# Patient Record
Sex: FEMALE | Race: WHITE | Employment: OTHER | URBAN - METROPOLITAN AREA
[De-identification: names, ages, dates, MRNs, and addresses within clinical notes are randomized per-mention and may not be internally consistent; named-entity substitution may affect disease eponyms.]

---

## 2017-03-07 ENCOUNTER — HOSPITAL ENCOUNTER (OUTPATIENT)
Dept: CT IMAGING | Age: 67
Discharge: HOME | End: 2017-03-07
Attending: INTERNAL MEDICINE
Payer: MEDICARE

## 2017-03-07 DIAGNOSIS — R91.1 LUNG NODULE: ICD-10-CM

## 2017-03-07 PROCEDURE — 71250 CT THORAX DX C-: CPT

## 2017-08-28 ENCOUNTER — HOSPITAL ENCOUNTER (OUTPATIENT)
Dept: CT IMAGING | Age: 67
Discharge: HOME | End: 2017-08-28
Attending: INTERNAL MEDICINE
Payer: MEDICARE

## 2017-08-28 DIAGNOSIS — R91.1 SOLITARY PULMONARY NODULE: ICD-10-CM

## 2017-08-28 PROCEDURE — 71250 CT THORAX DX C-: CPT

## 2018-05-09 ENCOUNTER — HOSPITAL ENCOUNTER (OUTPATIENT)
Dept: MAMMOGRAPHY | Age: 68
Discharge: HOME | End: 2018-05-09
Attending: NURSE PRACTITIONER
Payer: MEDICARE

## 2018-05-09 ENCOUNTER — HOSPITAL ENCOUNTER (OUTPATIENT)
Dept: GENERAL RADIOLOGY | Age: 68
Discharge: HOME | End: 2018-05-09
Attending: NURSE PRACTITIONER
Payer: MEDICARE

## 2018-05-09 DIAGNOSIS — M81.0 OSTEOPOROSIS: ICD-10-CM

## 2018-05-09 DIAGNOSIS — Z12.31 VISIT FOR SCREENING MAMMOGRAM: ICD-10-CM

## 2018-05-09 PROCEDURE — 77063 BREAST TOMOSYNTHESIS BI: CPT

## 2018-05-09 PROCEDURE — 77080 DXA BONE DENSITY AXIAL: CPT

## 2018-05-14 ENCOUNTER — HOSPITAL ENCOUNTER (OUTPATIENT)
Dept: MAMMOGRAPHY | Age: 68
Discharge: HOME | End: 2018-05-14
Attending: NURSE PRACTITIONER
Payer: MEDICARE

## 2018-05-14 DIAGNOSIS — N64.89 BREAST ASYMMETRY: ICD-10-CM

## 2018-05-14 PROCEDURE — 77065 DX MAMMO INCL CAD UNI: CPT

## 2018-10-24 ENCOUNTER — HOSPITAL ENCOUNTER (OUTPATIENT)
Dept: GENERAL RADIOLOGY | Age: 68
Discharge: HOME | End: 2018-10-24
Payer: MEDICARE

## 2018-10-24 DIAGNOSIS — J44.9 CHRONIC OBSTRUCTIVE PULMONARY DISEASE (HCC): ICD-10-CM

## 2018-10-24 PROCEDURE — 71046 X-RAY EXAM CHEST 2 VIEWS: CPT

## 2019-03-13 ENCOUNTER — HOSPITAL ENCOUNTER (OUTPATIENT)
Dept: CT IMAGING | Age: 69
Discharge: HOME | End: 2019-03-13
Attending: NURSE PRACTITIONER
Payer: MEDICARE

## 2019-03-13 DIAGNOSIS — R91.1 SOLITARY PULMONARY NODULE: ICD-10-CM

## 2019-03-13 DIAGNOSIS — J98.8 OTHER SPECIFIED RESPIRATORY DISORDERS: ICD-10-CM

## 2019-03-13 PROCEDURE — 71250 CT THORAX DX C-: CPT

## 2019-06-04 ENCOUNTER — HOSPITAL ENCOUNTER (OUTPATIENT)
Dept: CT IMAGING | Age: 69
Discharge: HOME | End: 2019-06-04
Attending: NURSE PRACTITIONER
Payer: MEDICARE

## 2019-06-04 DIAGNOSIS — R91.1 SOLITARY PULMONARY NODULE: ICD-10-CM

## 2019-06-04 DIAGNOSIS — J98.8 OTHER SPECIFIED RESPIRATORY DISORDERS: ICD-10-CM

## 2019-06-04 PROCEDURE — 71250 CT THORAX DX C-: CPT

## 2019-07-08 ENCOUNTER — HOSPITAL ENCOUNTER (OUTPATIENT)
Dept: MAMMOGRAPHY | Age: 69
Discharge: HOME | End: 2019-07-08
Attending: NURSE PRACTITIONER
Payer: MEDICARE

## 2019-07-08 DIAGNOSIS — Z12.39 BREAST SCREENING, UNSPECIFIED: ICD-10-CM

## 2019-07-08 PROCEDURE — 77063 BREAST TOMOSYNTHESIS BI: CPT

## 2020-02-15 ENCOUNTER — APPOINTMENT (OUTPATIENT)
Dept: GENERAL RADIOLOGY | Age: 70
DRG: 871 | End: 2020-02-15
Attending: PHYSICIAN ASSISTANT
Payer: MEDICARE

## 2020-02-15 ENCOUNTER — APPOINTMENT (OUTPATIENT)
Dept: CT IMAGING | Age: 70
DRG: 871 | End: 2020-02-15
Attending: EMERGENCY MEDICINE
Payer: MEDICARE

## 2020-02-15 ENCOUNTER — HOSPITAL ENCOUNTER (INPATIENT)
Age: 70
LOS: 2 days | Discharge: HOME | DRG: 871 | End: 2020-02-17
Attending: EMERGENCY MEDICINE | Admitting: HOSPITALIST
Payer: MEDICARE

## 2020-02-15 DIAGNOSIS — D84.9 IMMUNOSUPPRESSED STATUS (HCC): ICD-10-CM

## 2020-02-15 DIAGNOSIS — A41.9 SEPSIS, DUE TO UNSPECIFIED ORGANISM, UNSPECIFIED WHETHER ACUTE ORGAN DYSFUNCTION PRESENT (HCC): Primary | ICD-10-CM

## 2020-02-15 LAB
ALBUMIN SERPL-MCNC: 3.8 G/DL (ref 3.4–5)
ALBUMIN/GLOB SERPL: 0.9 {RATIO} (ref 0.8–1.7)
ALP SERPL-CCNC: 61 U/L (ref 45–117)
ALT SERPL-CCNC: 20 U/L (ref 13–56)
ANION GAP SERPL CALC-SCNC: 9 MMOL/L (ref 3–18)
AST SERPL-CCNC: 15 U/L (ref 10–38)
BASOPHILS # BLD: 0.1 K/UL (ref 0–0.1)
BASOPHILS NFR BLD: 0 % (ref 0–2)
BILIRUB DIRECT SERPL-MCNC: 0.1 MG/DL (ref 0–0.2)
BILIRUB SERPL-MCNC: 0.3 MG/DL (ref 0.2–1)
BUN SERPL-MCNC: 14 MG/DL (ref 7–18)
BUN/CREAT SERPL: 15 (ref 12–20)
CALCIUM SERPL-MCNC: 9.5 MG/DL (ref 8.5–10.1)
CHLORIDE SERPL-SCNC: 102 MMOL/L (ref 100–111)
CO2 SERPL-SCNC: 25 MMOL/L (ref 21–32)
CREAT SERPL-MCNC: 0.94 MG/DL (ref 0.6–1.3)
DIFFERENTIAL METHOD BLD: ABNORMAL
EOSINOPHIL # BLD: 0.1 K/UL (ref 0–0.4)
EOSINOPHIL NFR BLD: 0 % (ref 0–5)
ERYTHROCYTE [DISTWIDTH] IN BLOOD BY AUTOMATED COUNT: 15.3 % (ref 11.6–14.5)
EST. AVERAGE GLUCOSE BLD GHB EST-MCNC: 146 MG/DL
FLUAV AG NPH QL IA: NEGATIVE
FLUBV AG NOSE QL IA: NEGATIVE
GLOBULIN SER CALC-MCNC: 4.1 G/DL (ref 2–4)
GLUCOSE BLD STRIP.AUTO-MCNC: 168 MG/DL (ref 70–110)
GLUCOSE SERPL-MCNC: 139 MG/DL (ref 74–99)
HBA1C MFR BLD: 6.7 % (ref 4.2–5.6)
HCT VFR BLD AUTO: 33.9 % (ref 35–45)
HGB BLD-MCNC: 10.9 G/DL (ref 12–16)
LACTATE SERPL-SCNC: 1.6 MMOL/L (ref 0.4–2)
LYMPHOCYTES # BLD: 1.4 K/UL (ref 0.9–3.6)
LYMPHOCYTES NFR BLD: 7 % (ref 21–52)
MCH RBC QN AUTO: 28.5 PG (ref 24–34)
MCHC RBC AUTO-ENTMCNC: 32.2 G/DL (ref 31–37)
MCV RBC AUTO: 88.5 FL (ref 74–97)
MONOCYTES # BLD: 2.1 K/UL (ref 0.05–1.2)
MONOCYTES NFR BLD: 10 % (ref 3–10)
NEUTS SEG # BLD: 16.8 K/UL (ref 1.8–8)
NEUTS SEG NFR BLD: 83 % (ref 40–73)
PLATELET # BLD AUTO: 503 K/UL (ref 135–420)
PMV BLD AUTO: 8.8 FL (ref 9.2–11.8)
POTASSIUM SERPL-SCNC: 3.7 MMOL/L (ref 3.5–5.5)
PROT SERPL-MCNC: 7.9 G/DL (ref 6.4–8.2)
RBC # BLD AUTO: 3.83 M/UL (ref 4.2–5.3)
SODIUM SERPL-SCNC: 136 MMOL/L (ref 136–145)
WBC # BLD AUTO: 20.4 K/UL (ref 4.6–13.2)

## 2020-02-15 PROCEDURE — 96375 TX/PRO/DX INJ NEW DRUG ADDON: CPT

## 2020-02-15 PROCEDURE — 71045 X-RAY EXAM CHEST 1 VIEW: CPT

## 2020-02-15 PROCEDURE — 83605 ASSAY OF LACTIC ACID: CPT

## 2020-02-15 PROCEDURE — 82962 GLUCOSE BLOOD TEST: CPT

## 2020-02-15 PROCEDURE — 74011636637 HC RX REV CODE- 636/637: Performed by: HOSPITALIST

## 2020-02-15 PROCEDURE — 83036 HEMOGLOBIN GLYCOSYLATED A1C: CPT

## 2020-02-15 PROCEDURE — 93005 ELECTROCARDIOGRAM TRACING: CPT

## 2020-02-15 PROCEDURE — 74011250636 HC RX REV CODE- 250/636: Performed by: EMERGENCY MEDICINE

## 2020-02-15 PROCEDURE — 65270000029 HC RM PRIVATE

## 2020-02-15 PROCEDURE — 87040 BLOOD CULTURE FOR BACTERIA: CPT

## 2020-02-15 PROCEDURE — 74011636320 HC RX REV CODE- 636/320: Performed by: EMERGENCY MEDICINE

## 2020-02-15 PROCEDURE — 80048 BASIC METABOLIC PNL TOTAL CA: CPT

## 2020-02-15 PROCEDURE — 85025 COMPLETE CBC W/AUTO DIFF WBC: CPT

## 2020-02-15 PROCEDURE — 71275 CT ANGIOGRAPHY CHEST: CPT

## 2020-02-15 PROCEDURE — 80076 HEPATIC FUNCTION PANEL: CPT

## 2020-02-15 PROCEDURE — 74011000258 HC RX REV CODE- 258: Performed by: EMERGENCY MEDICINE

## 2020-02-15 PROCEDURE — 96365 THER/PROPH/DIAG IV INF INIT: CPT

## 2020-02-15 PROCEDURE — 74011250636 HC RX REV CODE- 250/636: Performed by: HOSPITALIST

## 2020-02-15 PROCEDURE — 87804 INFLUENZA ASSAY W/OPTIC: CPT

## 2020-02-15 PROCEDURE — 99285 EMERGENCY DEPT VISIT HI MDM: CPT

## 2020-02-15 PROCEDURE — 74011250637 HC RX REV CODE- 250/637: Performed by: EMERGENCY MEDICINE

## 2020-02-15 PROCEDURE — 74011250637 HC RX REV CODE- 250/637: Performed by: HOSPITALIST

## 2020-02-15 RX ORDER — PANTOPRAZOLE SODIUM 20 MG/1
20 TABLET, DELAYED RELEASE ORAL
Status: DISCONTINUED | OUTPATIENT
Start: 2020-02-16 | End: 2020-02-17 | Stop reason: HOSPADM

## 2020-02-15 RX ORDER — FLUOXETINE HYDROCHLORIDE 20 MG/1
20 CAPSULE ORAL DAILY
Status: DISCONTINUED | OUTPATIENT
Start: 2020-02-16 | End: 2020-02-17 | Stop reason: HOSPADM

## 2020-02-15 RX ORDER — LANOLIN ALCOHOL/MO/W.PET/CERES
9 CREAM (GRAM) TOPICAL
Status: DISCONTINUED | OUTPATIENT
Start: 2020-02-15 | End: 2020-02-17 | Stop reason: HOSPADM

## 2020-02-15 RX ORDER — LORATADINE 10 MG/1
10 TABLET ORAL
Status: DISCONTINUED | OUTPATIENT
Start: 2020-02-15 | End: 2020-02-17 | Stop reason: HOSPADM

## 2020-02-15 RX ORDER — LORATADINE 10 MG/1
10 TABLET ORAL
COMMUNITY

## 2020-02-15 RX ORDER — PREDNISONE 1 MG/1
TABLET ORAL
COMMUNITY
End: 2020-08-19

## 2020-02-15 RX ORDER — FOLIC ACID 1 MG/1
1 TABLET ORAL DAILY
Status: DISCONTINUED | OUTPATIENT
Start: 2020-02-16 | End: 2020-02-17 | Stop reason: HOSPADM

## 2020-02-15 RX ORDER — ALBUTEROL SULFATE 90 UG/1
AEROSOL, METERED RESPIRATORY (INHALATION)
COMMUNITY

## 2020-02-15 RX ORDER — KETOROLAC TROMETHAMINE 30 MG/ML
15 INJECTION, SOLUTION INTRAMUSCULAR; INTRAVENOUS ONCE
Status: COMPLETED | OUTPATIENT
Start: 2020-02-15 | End: 2020-02-15

## 2020-02-15 RX ORDER — SODIUM CHLORIDE 0.9 % (FLUSH) 0.9 %
5-10 SYRINGE (ML) INJECTION AS NEEDED
Status: DISCONTINUED | OUTPATIENT
Start: 2020-02-15 | End: 2020-02-17 | Stop reason: HOSPADM

## 2020-02-15 RX ORDER — ONDANSETRON 2 MG/ML
4 INJECTION INTRAMUSCULAR; INTRAVENOUS
Status: DISCONTINUED | OUTPATIENT
Start: 2020-02-15 | End: 2020-02-17 | Stop reason: HOSPADM

## 2020-02-15 RX ORDER — BUSPIRONE HYDROCHLORIDE 7.5 MG/1
7.5 TABLET ORAL 2 TIMES DAILY
COMMUNITY

## 2020-02-15 RX ORDER — METFORMIN HYDROCHLORIDE 500 MG/1
TABLET ORAL 2 TIMES DAILY WITH MEALS
COMMUNITY

## 2020-02-15 RX ORDER — SODIUM CHLORIDE 9 MG/ML
100 INJECTION, SOLUTION INTRAVENOUS CONTINUOUS
Status: DISCONTINUED | OUTPATIENT
Start: 2020-02-15 | End: 2020-02-16

## 2020-02-15 RX ORDER — IBUPROFEN 400 MG/1
800 TABLET ORAL
Status: DISCONTINUED | OUTPATIENT
Start: 2020-02-15 | End: 2020-02-17 | Stop reason: HOSPADM

## 2020-02-15 RX ORDER — FLUOXETINE HYDROCHLORIDE 20 MG/1
CAPSULE ORAL DAILY
COMMUNITY

## 2020-02-15 RX ORDER — MAGNESIUM SULFATE 100 %
4 CRYSTALS MISCELLANEOUS AS NEEDED
Status: DISCONTINUED | OUTPATIENT
Start: 2020-02-15 | End: 2020-02-17 | Stop reason: HOSPADM

## 2020-02-15 RX ORDER — METHOTREXATE 2.5 MG/1
5 TABLET ORAL
Status: DISCONTINUED | OUTPATIENT
Start: 2020-02-21 | End: 2020-02-17 | Stop reason: HOSPADM

## 2020-02-15 RX ORDER — BUSPIRONE HYDROCHLORIDE 7.5 MG/1
7.5 TABLET ORAL 3 TIMES DAILY
Status: DISCONTINUED | OUTPATIENT
Start: 2020-02-15 | End: 2020-02-17 | Stop reason: HOSPADM

## 2020-02-15 RX ORDER — MONTELUKAST SODIUM 10 MG/1
10 TABLET ORAL DAILY
Status: DISCONTINUED | OUTPATIENT
Start: 2020-02-16 | End: 2020-02-17 | Stop reason: HOSPADM

## 2020-02-15 RX ORDER — MONTELUKAST SODIUM 10 MG/1
10 TABLET ORAL DAILY
COMMUNITY

## 2020-02-15 RX ORDER — ACETAMINOPHEN 325 MG/1
650 TABLET ORAL
Status: DISCONTINUED | OUTPATIENT
Start: 2020-02-15 | End: 2020-02-17 | Stop reason: HOSPADM

## 2020-02-15 RX ORDER — IBUPROFEN 200 MG
800 TABLET ORAL
COMMUNITY
End: 2020-02-17

## 2020-02-15 RX ORDER — CHOLECALCIFEROL (VITAMIN D3) 125 MCG
CAPSULE ORAL
COMMUNITY

## 2020-02-15 RX ORDER — ACETAMINOPHEN 500 MG
1000 TABLET ORAL ONCE
Status: COMPLETED | OUTPATIENT
Start: 2020-02-15 | End: 2020-02-15

## 2020-02-15 RX ORDER — FLUTICASONE PROPIONATE AND SALMETEROL 250; 50 UG/1; UG/1
1 POWDER RESPIRATORY (INHALATION) EVERY 12 HOURS
COMMUNITY

## 2020-02-15 RX ORDER — OMEPRAZOLE 20 MG/1
20 CAPSULE, DELAYED RELEASE ORAL DAILY
COMMUNITY

## 2020-02-15 RX ORDER — ZOLEDRONIC ACID 5 MG/100ML
5 INJECTION, SOLUTION INTRAVENOUS ONCE
COMMUNITY

## 2020-02-15 RX ORDER — INSULIN LISPRO 100 [IU]/ML
INJECTION, SOLUTION INTRAVENOUS; SUBCUTANEOUS
Status: DISCONTINUED | OUTPATIENT
Start: 2020-02-15 | End: 2020-02-17 | Stop reason: HOSPADM

## 2020-02-15 RX ORDER — METHOTREXATE 2.5 MG/1
TABLET ORAL
COMMUNITY
End: 2020-11-07

## 2020-02-15 RX ORDER — IPRATROPIUM BROMIDE AND ALBUTEROL SULFATE 2.5; .5 MG/3ML; MG/3ML
3 SOLUTION RESPIRATORY (INHALATION)
Status: DISCONTINUED | OUTPATIENT
Start: 2020-02-15 | End: 2020-02-17 | Stop reason: HOSPADM

## 2020-02-15 RX ORDER — FOLIC ACID 1 MG/1
TABLET ORAL DAILY
Status: ON HOLD | COMMUNITY
End: 2020-11-04 | Stop reason: ALTCHOICE

## 2020-02-15 RX ORDER — DOXYCYCLINE 100 MG/1
100 CAPSULE ORAL
Status: COMPLETED | OUTPATIENT
Start: 2020-02-15 | End: 2020-02-15

## 2020-02-15 RX ORDER — PREDNISONE 1 MG/1
2 TABLET ORAL DAILY
Status: DISCONTINUED | OUTPATIENT
Start: 2020-02-16 | End: 2020-02-17 | Stop reason: HOSPADM

## 2020-02-15 RX ORDER — ENOXAPARIN SODIUM 100 MG/ML
40 INJECTION SUBCUTANEOUS EVERY 24 HOURS
Status: DISCONTINUED | OUTPATIENT
Start: 2020-02-15 | End: 2020-02-17 | Stop reason: HOSPADM

## 2020-02-15 RX ADMIN — KETOROLAC TROMETHAMINE 15 MG: 30 INJECTION, SOLUTION INTRAMUSCULAR at 18:38

## 2020-02-15 RX ADMIN — SODIUM CHLORIDE, SODIUM LACTATE, POTASSIUM CHLORIDE, AND CALCIUM CHLORIDE 1000 ML: 600; 310; 30; 20 INJECTION, SOLUTION INTRAVENOUS at 18:39

## 2020-02-15 RX ADMIN — IOPAMIDOL 75 ML: 612 INJECTION, SOLUTION INTRAVENOUS at 20:52

## 2020-02-15 RX ADMIN — CEFTRIAXONE 1 G: 1 INJECTION, POWDER, FOR SOLUTION INTRAMUSCULAR; INTRAVENOUS at 18:38

## 2020-02-15 RX ADMIN — SODIUM CHLORIDE 100 ML/HR: 900 INJECTION, SOLUTION INTRAVENOUS at 21:52

## 2020-02-15 RX ADMIN — BUSPIRONE HYDROCHLORIDE 7.5 MG: 7.5 TABLET ORAL at 22:03

## 2020-02-15 RX ADMIN — SODIUM CHLORIDE, SODIUM LACTATE, POTASSIUM CHLORIDE, AND CALCIUM CHLORIDE 1000 ML: 600; 310; 30; 20 INJECTION, SOLUTION INTRAVENOUS at 18:43

## 2020-02-15 RX ADMIN — ENOXAPARIN SODIUM 40 MG: 40 INJECTION, SOLUTION INTRAVENOUS; SUBCUTANEOUS at 22:03

## 2020-02-15 RX ADMIN — DOXYCYCLINE 100 MG: 100 CAPSULE ORAL at 18:38

## 2020-02-15 RX ADMIN — INSULIN LISPRO 2 UNITS: 100 INJECTION, SOLUTION INTRAVENOUS; SUBCUTANEOUS at 22:12

## 2020-02-15 RX ADMIN — ACETAMINOPHEN 1000 MG: 500 TABLET, FILM COATED ORAL at 18:38

## 2020-02-15 NOTE — ED TRIAGE NOTES
Woke up shivering  Temp 98.3 and states that is a fever to her. Began to vomit and thought she was going to pass out.     Sent here from [facility] for elevated WBC   1gram of rocephin given   Also states she has lower back pain

## 2020-02-15 NOTE — ED NOTES
I performed a brief evaluation, including history and physical, of the patient here in triage and I have determined that pt will need further treatment and evaluation from the main side ER physician. I have placed initial orders to help in expediting patients care. February 15, 2020 at 4:34 PM - SOFIA Emmanuel        [facility] at [facility] today, told she has pneumonia and had a WBC of 18 with a left shift.

## 2020-02-15 NOTE — ED PROVIDER NOTES
EMERGENCY DEPARTMENT HISTORY AND PHYSICAL EXAM    6:27 PM      Date: 2/15/2020  Patient Name: Lexa Vicente    History of Presenting Illness     Chief Complaint   Patient presents with    Other         History Provided By: Patient      Additional History (Context): Lexa Vicente is a 78 y.o. female with hypertension and COPD, asthma, RA who presents with concern for pna. [facility] because she was feeling somewhat short of breath, chest tightness, body pains all over, diagnosed with left-sided pneumonia and elevated white blood cells. She is having pain all over, this feels like her rheumatoid arthritis flaring, she does take methotrexate as well as Orencia every 4 weeks. While she was at [facility], she suddenly felt flushed and vomited a few times, not currently feeling nauseous or having any vomiting, has not been coughing anything up except clear sputum, this is all been going on for about 3 days.   No history of blood clots, not on a blood thinner, no leg swelling, does have an itchy rash on her right shoulder blade    PCP: Linda Banerjee NP    Current Facility-Administered Medications   Medication Dose Route Frequency Provider Last Rate Last Dose    sodium chloride (NS) flush 5-10 mL  5-10 mL IntraVENous PRN Leonie Hogan MD        albuterol-ipratropium (DUO-NEB) 2.5 MG-0.5 MG/3 ML  3 mL Nebulization Q6H RT Leonie Hogan MD   3 mL at 02/16/20 0201    busPIRone (BUSPAR) tablet 7.5 mg  7.5 mg Oral TID Leonie Hogan MD   7.5 mg at 02/15/20 2203    FLUoxetine (PROzac) capsule 20 mg  20 mg Oral DAILY Leonie Hogan MD        folic acid (FOLVITE) tablet 1 mg  1 mg Oral DAILY Leonie Hogan MD        ibuprofen (MOTRIN) tablet 800 mg  800 mg Oral Q8H PRN Leonie Hogan MD        melatonin tablet 9 mg  9 mg Oral QHS PRN Leonie Hogan MD        loratadine (CLARITIN) tablet 10 mg  10 mg Oral DAILY PRN Leonie Hogan MD        [START ON 2/21/2020] methotrexate ([address]) tablet 5 mg  5 mg Oral every Friday Carmen Anderson MD        montelukast (SINGULAIR) tablet 10 mg  10 mg Oral DAILY Carmen Anderson MD        pantoprazole (PROTONIX) tablet 20 mg  20 mg Oral ACB Carmen Anderson MD        predniSONE (DELTASONE) tablet 2 mg  2 mg Oral DAILY Carmen Anderson MD        cefTRIAXone (ROCEPHIN) 1 g in 0.9% sodium chloride (MBP/ADV) 50 mL MBP  1 g IntraVENous Q24H Carmen Anderson MD        [facility]) 500 mg in 0.9% sodium chloride 250 mL IVPB  500 mg IntraVENous Q24H Carmen Anderson MD        0.9% sodium chloride infusion  100 mL/hr IntraVENous CONTINUOUS Carmen Anderson  mL/hr at 02/15/20 2152 100 mL/hr at 02/15/20 2152    acetaminophen (TYLENOL) tablet 650 mg  650 mg Oral Q4H PRN Carmen Anderson MD   650 mg at 02/16/20 0015    ondansetron (ZOFRAN) injection 4 mg  4 mg IntraVENous Q4H PRN Carmen Anderson MD        enoxaparin (LOVENOX) injection 40 mg  40 mg SubCUTAneous Q24H Carmen Anderson MD   40 mg at 02/15/20 2203    insulin lispro (HUMALOG) injection   SubCUTAneous AC&HS Carmen Anderson MD   2 Units at 02/15/20 2212    glucose chewable tablet 16 g  4 Tab Oral PRN Carmen Anderson MD        glucagon [facility]) injection 1 mg  1 mg IntraMUSCular PRN Carmen Anderson MD           Past History     Past Medical History:  Past Medical History:   Diagnosis Date    Menopause        Past Surgical History:  History reviewed. No pertinent surgical history. Family History:  History reviewed. No pertinent family history. Social History:  Social History     Tobacco Use    Smoking status: Not on file   Substance Use Topics    Alcohol use: Not on file    Drug use: Not on file       Allergies: Allergies   Allergen Reactions    Sulfa (Sulfonamide Antibiotics) Diarrhea         Review of Systems       Review of Systems   Constitutional: Negative. Negative for activity change, chills and fever. HENT: Negative.   Negative for ear pain, hearing loss and sore throat. Eyes: Negative. Negative for pain and visual disturbance. Respiratory: Positive for shortness of breath. Negative for cough and chest tightness. Cardiovascular: Negative. Negative for chest pain and leg swelling. Gastrointestinal: Positive for nausea and vomiting. Negative for abdominal distention and abdominal pain. Genitourinary: Negative. Negative for dysuria and hematuria. Musculoskeletal: Positive for arthralgias and myalgias. Skin: Positive for rash. Neurological: Negative. Negative for dizziness and headaches. Psychiatric/Behavioral: Negative. Negative for agitation and behavioral problems. Physical Exam     Visit Vitals  /72 (BP 1 Location: Left arm, BP Patient Position: At rest)   Pulse 96   Temp 98.2 °F (36.8 °C)   Resp 17   Ht 5' 2\" (1.575 m)   Wt 52.6 kg (116 lb)   SpO2 99%   BMI 21.22 kg/m²         Physical Exam  Constitutional:       Appearance: She is well-developed. HENT:      Head: Normocephalic and atraumatic. Eyes:      General:         Right eye: No discharge. Left eye: No discharge. Pupils: Pupils are equal, round, and reactive to light. Neck:      Musculoskeletal: Normal range of motion and neck supple. Vascular: No JVD. Trachea: No tracheal deviation. Cardiovascular:      Rate and Rhythm: Regular rhythm. Tachycardia present. Heart sounds: Murmur (2/6 systolic murmur) present. Pulmonary:      Effort: Pulmonary effort is normal. No respiratory distress. Breath sounds: Normal breath sounds. No wheezing or rales. Comments: No respiratory distress, crackles in left base, no tachypnea, no retractions  Abdominal:      General: Bowel sounds are normal. There is no distension. Palpations: Abdomen is soft. Tenderness: There is no abdominal tenderness. There is no rebound. Musculoskeletal: Normal range of motion. General: No tenderness or deformity.       Comments: No swelling over any of her joints, no erythema   Skin:     General: Skin is warm and dry. Findings: No erythema or rash. Comments: Approximately 5 x 3 cm irregular bordered raised plaque with scaling on top, possibly consistent with plaque psoriasis   Neurological:      Mental Status: She is alert and oriented to person, place, and time. Cranial Nerves: No cranial nerve deficit. Comments: 5/5 strength UE/LE, 5/5 sensation UE/LE     Psychiatric:         Behavior: Behavior normal.           Diagnostic Study Results     Labs -  Recent Results (from the past 12 hour(s))   CBC WITH AUTOMATED DIFF    Collection Time: 02/15/20  4:59 PM   Result Value Ref Range    WBC 20.4 (H) 4.6 - 13.2 K/uL    RBC 3.83 (L) 4.20 - 5.30 M/uL    HGB 10.9 (L) 12.0 - 16.0 g/dL    HCT 33.9 (L) 35.0 - 45.0 %    MCV 88.5 74.0 - 97.0 FL    MCH 28.5 24.0 - 34.0 PG    MCHC 32.2 31.0 - 37.0 g/dL    RDW 15.3 (H) 11.6 - 14.5 %    PLATELET 872 (H) 932 - 420 K/uL    MPV 8.8 (L) 9.2 - 11.8 FL    NEUTROPHILS 83 (H) 40 - 73 %    LYMPHOCYTES 7 (L) 21 - 52 %    MONOCYTES 10 3 - 10 %    EOSINOPHILS 0 0 - 5 %    BASOPHILS 0 0 - 2 %    ABS. NEUTROPHILS 16.8 (H) 1.8 - 8.0 K/UL    ABS. LYMPHOCYTES 1.4 0.9 - 3.6 K/UL    ABS. MONOCYTES 2.1 (H) 0.05 - 1.2 K/UL    ABS. EOSINOPHILS 0.1 0.0 - 0.4 K/UL    ABS.  BASOPHILS 0.1 0.0 - 0.1 K/UL    DF AUTOMATED     METABOLIC PANEL, BASIC    Collection Time: 02/15/20  4:59 PM   Result Value Ref Range    Sodium 136 136 - 145 mmol/L    Potassium 3.7 3.5 - 5.5 mmol/L    Chloride 102 100 - 111 mmol/L    CO2 25 21 - 32 mmol/L    Anion gap 9 3.0 - 18 mmol/L    Glucose 139 (H) 74 - 99 mg/dL    BUN 14 7.0 - 18 MG/DL    Creatinine 0.94 0.6 - 1.3 MG/DL    BUN/Creatinine ratio 15 12 - 20      GFR est AA >60 >60 ml/min/1.73m2    GFR est non-AA 59 (L) >60 ml/min/1.73m2    Calcium 9.5 8.5 - 10.1 MG/DL   HEPATIC FUNCTION PANEL    Collection Time: 02/15/20  4:59 PM   Result Value Ref Range    Protein, total 7.9 6.4 - 8.2 g/dL    Albumin 3.8 3.4 - 5.0 g/dL    Globulin 4.1 (H) 2.0 - 4.0 g/dL    A-G Ratio 0.9 0.8 - 1.7      Bilirubin, total 0.3 0.2 - 1.0 MG/DL    Bilirubin, direct 0.1 0.0 - 0.2 MG/DL    Alk. phosphatase 61 45 - 117 U/L    AST (SGOT) 15 10 - 38 U/L    ALT (SGPT) 20 13 - 56 U/L   HEMOGLOBIN A1C WITH EAG    Collection Time: 02/15/20  4:59 PM   Result Value Ref Range    Hemoglobin A1c 6.7 (H) 4.2 - 5.6 %    Est. average glucose 146 mg/dL   LACTIC ACID    Collection Time: 02/15/20  5:55 PM   Result Value Ref Range    Lactic acid 1.6 0.4 - 2.0 MMOL/L   INFLUENZA A & B AG (RAPID TEST)    Collection Time: 02/15/20  6:47 PM   Result Value Ref Range    Influenza A Antigen NEGATIVE  NEG      Influenza B Antigen NEGATIVE  NEG     GLUCOSE, POC    Collection Time: 02/15/20 10:05 PM   Result Value Ref Range    Glucose (POC) 168 (H) 70 - 110 mg/dL       Radiologic Studies -   XR CHEST SNGL V    (Results Pending)   CTA CHEST W OR W WO CONT    (Results Pending)         Medical Decision Making   I am the first provider for this patient. I reviewed the vital signs, available nursing notes, past medical history, past surgical history, family history and social history. Vital Signs-Reviewed the patient's vital signs. EKG: Interpreted by the EP.    Time Interpreted: 1805   Rate: 110   Rhythm: Sinus Tachycardia    Interpretation: normal axis, LAE, sinus arrhythmia   Comparison: no prior    Records Reviewed: Nursing Notes and Old Medical Records      Provider Notes (Medical Decision Making):     MDM  Number of Diagnoses or Management Options  Immunosuppressed status ([facility]):   Sepsis, due to unspecified organism, unspecified whether acute organ dysfunction present [facility]):   Diagnosis management comments: Differential Diagnosis: Pneumonia, influenza, PE, bacteremia    Patient triggering sepsis bundle, given that she has immunosuppressed on Orencia and methotrexate with an apparent left pneumonia, I do think it would be worth bringing patient in for 23-hour observation and IV antibiotics to make sure she does not decompensate, I do not think patient has a PE as she is not tachypneic, not hypoxic, has crackles in the left base, no leg swelling or signs of DVT. Will give fluids, ceftriaxone, doxycycline, treat patient's body aches. Patient has good cap refill at this time, no signs of poor perfusion    Reevaluation: On reevaluation, patient is now received 30 cc/kg of fluid, still not feeling great, however her cap refill is less than 2 seconds, she has no signs of poor perfusion, does not need pressors at this time. She does not have an obvious pneumonia on her chest x-ray however physical exam strongly suggest towards this, given her immunosuppressed state I think maybe her chest x-ray is delayed. She also has this murmur which is new per the patient, would likely benefit from an echocardiogram to rule out endocarditis, blood cultures have been sent. I discussed the case with the hospitalist who will admit over concerns for immunosuppressed state, rule out pneumonia and endocarditis, I have ordered a CTA chest to rule out PE and further clarify airspace disease and the hospitalist will follow this up. For Hospitalized Patients:    1. Critical Care Time:   Critical Care Time:  The services I provided to this patient were to treat and/or prevent clinically significant deterioration that could result in the failure of one or more body systems and/or organ systems due to sepsis, immunosuppression.     Services included the following:  -reviewing nursing notes and old charts  -vital sign assessments  -direct patient care  -medication orders and management  -interpreting and reviewing diagnostic studies/labs  -re-evaluations  -documentation time    Aggregate critical care time was 34 minutes, which includes only time during which I was engaged in work directly related to the patient's care as described above, whether I was at bedside or elsewhere in the Emergency Department. It did not include time spent performing other reported procedures or the services of residents, students, nurses, or advance practice providers. Nunu Peterson MD  6:35 PM        Diagnosis     Clinical Impression:   1. Sepsis, due to unspecified organism, unspecified whether acute organ dysfunction present ([facility]    2. Immunosuppressed status ([facility]        Disposition: admit    Follow-up Information    None          Current Discharge Medication List      CONTINUE these medications which have NOT CHANGED    Details   predniSONE (DELTASONE) 1 mg tablet Take  by mouth daily (with breakfast). abatacept (ORENCIA) 250 mg injection by IntraVENous route once. methotrexate (RHEUMATREX) 2.5 mg tablet Take  by mouth. 5mg takes on fridays      folic acid (FOLVITE) 1 mg tablet Take  by mouth daily. ibuprofen (MOTRIN) 200 mg tablet Take 800 mg by mouth every eight (8) hours as needed for Pain. zoledronic acid (RECLAST) 5 mg/100 mL pgbk 5 mg by IntraVENous route once. Once per year      fluticasone propion-salmeteroL (ADVAIR DISKUS) 250-50 mcg/dose diskus inhaler Take 1 Puff by inhalation every twelve (12) hours. montelukast (SINGULAIR) 10 mg tablet Take 10 mg by mouth daily. fluticasone propionate (FLONASE NA) by Nasal route. albuterol (PROAIR HFA) 90 mcg/actuation inhaler Take  by inhalation. loratadine (CLARITIN) 10 mg tablet Take 10 mg by mouth. FLUoxetine (PROZAC) 20 mg capsule Take  by mouth daily. omeprazole (PRILOSEC) 20 mg capsule Take 20 mg by mouth daily. metFORMIN (GLUCOPHAGE) 500 mg tablet Take  by mouth two (2) times daily (with meals). busPIRone (BUSPAR) 7.5 mg tablet Take 7.5 mg by mouth three (3) times daily. melatonin 5 mg tablet Take  by mouth nightly. 10mg prn at night           _______________________________    Please note that this dictation was completed with [employer], the [employer] voice recognition software.   Quite often unanticipated grammatical, syntax, homophones, and other interpretive errors are inadvertently transcribed by the computer software. Please disregard these errors. Please excuse any errors that have escaped final proofreading.

## 2020-02-15 NOTE — ED NOTES
Patient moved to bed 5. Sepsis proticol in place. Report feeling sick this week and had emisis x1 this afternoon. No acute signs of distress. Pt placed to monitor.

## 2020-02-16 ENCOUNTER — APPOINTMENT (OUTPATIENT)
Dept: NON INVASIVE DIAGNOSTICS | Age: 70
DRG: 871 | End: 2020-02-16
Attending: HOSPITALIST
Payer: MEDICARE

## 2020-02-16 PROBLEM — J18.9 PNEUMONIA: Status: ACTIVE | Noted: 2020-02-16

## 2020-02-16 LAB
ANION GAP SERPL CALC-SCNC: 7 MMOL/L (ref 3–18)
BUN SERPL-MCNC: 10 MG/DL (ref 7–18)
BUN/CREAT SERPL: 11 (ref 12–20)
CALCIUM SERPL-MCNC: 8.6 MG/DL (ref 8.5–10.1)
CHLORIDE SERPL-SCNC: 108 MMOL/L (ref 100–111)
CO2 SERPL-SCNC: 28 MMOL/L (ref 21–32)
CREAT SERPL-MCNC: 0.88 MG/DL (ref 0.6–1.3)
ECHO AO ROOT DIAM: 2.4 CM
ECHO EST RA PRESSURE: 3 MMHG
ECHO LA MAJOR AXIS: 3.15 CM
ECHO LA TO AORTIC ROOT RATIO: 1.31
ECHO LV EDV A2C: 65.8 ML
ECHO LV EDV A4C: 75.3 ML
ECHO LV EDV BP: 70.5 ML (ref 56–104)
ECHO LV EDV INDEX A4C: 49.7 ML/M2
ECHO LV EDV INDEX BP: 46.5 ML/M2
ECHO LV EDV NDEX A2C: 43.4 ML/M2
ECHO LV EDV TEICHHOLZ: 0.49 ML
ECHO LV EJECTION FRACTION A2C: 65 %
ECHO LV EJECTION FRACTION A4C: 69 %
ECHO LV EJECTION FRACTION BIPLANE: 66.7 % (ref 55–100)
ECHO LV ESV A2C: 23.4 ML
ECHO LV ESV A4C: 23.3 ML
ECHO LV ESV BP: 23.5 ML (ref 19–49)
ECHO LV ESV INDEX A2C: 15.4 ML/M2
ECHO LV ESV INDEX A4C: 15.4 ML/M2
ECHO LV ESV INDEX BP: 15.5 ML/M2
ECHO LV ESV TEICHHOLZ: 0.12 ML
ECHO LV INTERNAL DIMENSION DIASTOLIC: 4.17 CM (ref 3.9–5.3)
ECHO LV INTERNAL DIMENSION SYSTOLIC: 2.32 CM
ECHO LV IVSD: 0.77 CM (ref 0.6–0.9)
ECHO LV MASS 2D: 104.6 G (ref 67–162)
ECHO LV MASS INDEX 2D: 69 G/M2 (ref 43–95)
ECHO LV POSTERIOR WALL DIASTOLIC: 0.77 CM (ref 0.6–0.9)
ECHO LVOT DIAM: 1.73 CM
ECHO LVOT PEAK GRADIENT: 12.2 MMHG
ECHO LVOT PEAK VELOCITY: 174.42 CM/S
ECHO LVOT SV: 69.4 ML
ECHO LVOT VTI: 29.46 CM
ECHO MV A VELOCITY: 138.51 CM/S
ECHO MV E DECELERATION TIME (DT): 123 MS
ECHO MV E VELOCITY: 115.94 CM/S
ECHO MV E/A RATIO: 0.84
ECHO PULMONARY ARTERY SYSTOLIC PRESSURE (PASP): 41.2 MMHG
ECHO RA MINOR AXIS: 3.24 CM
ECHO RIGHT VENTRICULAR SYSTOLIC PRESSURE (RVSP): 41.2 MMHG
ECHO TV REGURGITANT MAX VELOCITY: 309.23 CM/S
ECHO TV REGURGITANT PEAK GRADIENT: 38.2 MMHG
ERYTHROCYTE [DISTWIDTH] IN BLOOD BY AUTOMATED COUNT: 15.4 % (ref 11.6–14.5)
GLUCOSE BLD STRIP.AUTO-MCNC: 126 MG/DL (ref 70–110)
GLUCOSE BLD STRIP.AUTO-MCNC: 136 MG/DL (ref 70–110)
GLUCOSE BLD STRIP.AUTO-MCNC: 141 MG/DL (ref 70–110)
GLUCOSE BLD STRIP.AUTO-MCNC: 163 MG/DL (ref 70–110)
GLUCOSE SERPL-MCNC: 108 MG/DL (ref 74–99)
HCT VFR BLD AUTO: 29.7 % (ref 35–45)
HGB BLD-MCNC: 9.4 G/DL (ref 12–16)
LVFS 2D: 44.41 %
LVOT MG: 6.52 MMHG
LVOT MV: 1.19 CM/S
LVSV (MOD BI): 30.97 ML
LVSV (MOD SINGLE 4C): 34.28 ML
LVSV (MOD SINGLE): 28 ML
LVSV (TEICH): 38.7 ML
MCH RBC QN AUTO: 28.6 PG (ref 24–34)
MCHC RBC AUTO-ENTMCNC: 31.6 G/DL (ref 31–37)
MCV RBC AUTO: 90.3 FL (ref 74–97)
MV DEC SLOPE: 9.43
PLATELET # BLD AUTO: 429 K/UL (ref 135–420)
PMV BLD AUTO: 9.2 FL (ref 9.2–11.8)
POTASSIUM SERPL-SCNC: 3.7 MMOL/L (ref 3.5–5.5)
RBC # BLD AUTO: 3.29 M/UL (ref 4.2–5.3)
SODIUM SERPL-SCNC: 143 MMOL/L (ref 136–145)
WBC # BLD AUTO: 20 K/UL (ref 4.6–13.2)

## 2020-02-16 PROCEDURE — 94761 N-INVAS EAR/PLS OXIMETRY MLT: CPT

## 2020-02-16 PROCEDURE — 93306 TTE W/DOPPLER COMPLETE: CPT

## 2020-02-16 PROCEDURE — 65270000029 HC RM PRIVATE

## 2020-02-16 PROCEDURE — 74011636637 HC RX REV CODE- 636/637: Performed by: HOSPITALIST

## 2020-02-16 PROCEDURE — 74011250637 HC RX REV CODE- 250/637: Performed by: HOSPITALIST

## 2020-02-16 PROCEDURE — 80048 BASIC METABOLIC PNL TOTAL CA: CPT

## 2020-02-16 PROCEDURE — 74011000258 HC RX REV CODE- 258: Performed by: HOSPITALIST

## 2020-02-16 PROCEDURE — 36415 COLL VENOUS BLD VENIPUNCTURE: CPT

## 2020-02-16 PROCEDURE — 94640 AIRWAY INHALATION TREATMENT: CPT

## 2020-02-16 PROCEDURE — 77030027138 HC INCENT SPIROMETER -A

## 2020-02-16 PROCEDURE — 85027 COMPLETE CBC AUTOMATED: CPT

## 2020-02-16 PROCEDURE — 74011250636 HC RX REV CODE- 250/636: Performed by: HOSPITALIST

## 2020-02-16 PROCEDURE — 82962 GLUCOSE BLOOD TEST: CPT

## 2020-02-16 PROCEDURE — 74011000250 HC RX REV CODE- 250: Performed by: HOSPITALIST

## 2020-02-16 RX ADMIN — AZITHROMYCIN MONOHYDRATE 500 MG: 500 INJECTION, POWDER, LYOPHILIZED, FOR SOLUTION INTRAVENOUS at 18:30

## 2020-02-16 RX ADMIN — CEFTRIAXONE 1 G: 1 INJECTION, POWDER, FOR SOLUTION INTRAMUSCULAR; INTRAVENOUS at 18:14

## 2020-02-16 RX ADMIN — SODIUM CHLORIDE 100 ML/HR: 900 INJECTION, SOLUTION INTRAVENOUS at 06:34

## 2020-02-16 RX ADMIN — IPRATROPIUM BROMIDE AND ALBUTEROL SULFATE 3 ML: .5; 3 SOLUTION RESPIRATORY (INHALATION) at 19:58

## 2020-02-16 RX ADMIN — ACETAMINOPHEN 650 MG: 325 TABLET, FILM COATED ORAL at 00:15

## 2020-02-16 RX ADMIN — IBUPROFEN 800 MG: 400 TABLET ORAL at 16:54

## 2020-02-16 RX ADMIN — IPRATROPIUM BROMIDE AND ALBUTEROL SULFATE 3 ML: .5; 3 SOLUTION RESPIRATORY (INHALATION) at 02:01

## 2020-02-16 RX ADMIN — ENOXAPARIN SODIUM 40 MG: 40 INJECTION, SOLUTION INTRAVENOUS; SUBCUTANEOUS at 21:16

## 2020-02-16 RX ADMIN — IBUPROFEN 800 MG: 400 TABLET ORAL at 09:47

## 2020-02-16 RX ADMIN — INSULIN LISPRO 2 UNITS: 100 INJECTION, SOLUTION INTRAVENOUS; SUBCUTANEOUS at 21:30

## 2020-02-16 RX ADMIN — PANTOPRAZOLE SODIUM 20 MG: 20 TABLET, DELAYED RELEASE ORAL at 09:47

## 2020-02-16 RX ADMIN — BUSPIRONE HYDROCHLORIDE 7.5 MG: 7.5 TABLET ORAL at 21:16

## 2020-02-16 RX ADMIN — IPRATROPIUM BROMIDE AND ALBUTEROL SULFATE 3 ML: .5; 3 SOLUTION RESPIRATORY (INHALATION) at 07:37

## 2020-02-16 RX ADMIN — FLUOXETINE 20 MG: 20 CAPSULE ORAL at 09:46

## 2020-02-16 RX ADMIN — BUSPIRONE HYDROCHLORIDE 7.5 MG: 7.5 TABLET ORAL at 16:54

## 2020-02-16 RX ADMIN — IPRATROPIUM BROMIDE AND ALBUTEROL SULFATE 3 ML: .5; 3 SOLUTION RESPIRATORY (INHALATION) at 13:50

## 2020-02-16 RX ADMIN — ACETAMINOPHEN 650 MG: 325 TABLET, FILM COATED ORAL at 06:33

## 2020-02-16 RX ADMIN — MONTELUKAST 10 MG: 10 TABLET, FILM COATED ORAL at 09:46

## 2020-02-16 RX ADMIN — PREDNISONE 2 MG: 1 TABLET ORAL at 11:59

## 2020-02-16 RX ADMIN — FOLIC ACID 1 MG: 1 TABLET ORAL at 09:47

## 2020-02-16 RX ADMIN — BUSPIRONE HYDROCHLORIDE 7.5 MG: 7.5 TABLET ORAL at 09:46

## 2020-02-16 NOTE — PROGRESS NOTES
TRANSFER - IN REPORT:    Verbal report received from [address], RN(name) on [facility]  being received from ED(unit) for routine progression of care      Report consisted of patients Situation, Background, Assessment and   Recommendations(SBAR). Information from the following report(s) SBAR, Kardex and ED Summary was reviewed with the receiving nurse. Opportunity for questions and clarification was provided. Assessment completed upon patients arrival to unit and care assumed. 2140- Received pt via stretcher. Alert an verbal able to make needs known. Complaint of a headache 6 of 10. Oriented to room and call light system. 4 small dry areas noted. One on right posterior shoulder and right hip. Two on left hip. NAD. Call light placed in reach.

## 2020-02-16 NOTE — PHYSICIAN ADVISORY
Letter of Status Determination:  
Recommend hospitalization status maintain as 
INPATIENT  Status Pt Name:  Mitali Diaz MR#  
STEPHANIE # [id number] / 
[id number] Payor: Janie Carlisle / Plan: Sadie Morataya / Product Type: Medicare /   
PETER#  [id number] Room and Hospital  2210/01  @ [place] [address] Hospitalization date  2/15/2020  4:42 PM  
Current Attending Physician  Roseanna Car DO  
Principal diagnosis  Sepsis ([facility] [A41.9] Clinicals  78 y.o. y.o  female hospitalized with above diagnosis Cultures are no growth to date. CTA chest pending. [place] (MCG) criteria Does apply Patient has conditions which need treatment and monitoring requiring inpatient admission. STATUS DETERMINATION  Inpatient Additional comments  Observation is appropriate for patient with 1 or more of the following: 
Immunocompromised state (eg, long-term systemic corticosteroids), Vital sign abnormality. Pending CTA results and clinical course, may suggest downgrade to observation. Payor: Janie Carlisle / Plan: [place] MEDICARE PART A & B / Product Type: Medicare /   
  
 
 
Chris Simpson M.D. Physician Advisor [facility] [employer] [address] [place] [address] C: [phone] [email or web address]

## 2020-02-16 NOTE — PROGRESS NOTES
Bedside and Verbal shift change report given to Kristin Cornejo RN (oncoming nurse) by Caralee Hodgkin, RN (offgoing nurse). Report included the following information SBAR, Kardex, Procedure Summary, Intake/Output, MAR and Recent Results.

## 2020-02-16 NOTE — PROGRESS NOTES
Problem: Diabetes Self-Management  Goal: *Disease process and treatment process  Description  Define diabetes and identify own type of diabetes; list 3 options for treating diabetes. Outcome: Progressing Towards Goal  Goal: *Incorporating nutritional management into lifestyle  Description  Describe effect of type, amount and timing of food on blood glucose; list 3 methods for planning meals. Outcome: Progressing Towards Goal  Goal: *Incorporating physical activity into lifestyle  Description  State effect of exercise on blood glucose levels. Outcome: Progressing Towards Goal  Goal: *Developing strategies to promote health/change behavior  Description  Define the ABC's of diabetes; identify appropriate screenings, schedule and personal plan for screenings. Outcome: Progressing Towards Goal  Goal: *Using medications safely  Description  State effect of diabetes medications on diabetes; name diabetes medication taking, action and side effects. Outcome: Progressing Towards Goal  Goal: *Monitoring blood glucose, interpreting and using results  Description  Identify recommended blood glucose targets  and personal targets. Outcome: Progressing Towards Goal  Goal: *Prevention, detection, treatment of acute complications  Description  List symptoms of hyper- and hypoglycemia; describe how to treat low blood sugar and actions for lowering  high blood glucose level. Outcome: Progressing Towards Goal  Goal: *Prevention, detection and treatment of chronic complications  Description  Define the natural course of diabetes and describe the relationship of blood glucose levels to long term complications of diabetes.   Outcome: Progressing Towards Goal  Goal: *Developing strategies to address psychosocial issues  Description  Describe feelings about living with diabetes; identify support needed and support network  Outcome: Progressing Towards Goal  Goal: *Insulin pump training  Outcome: Progressing Towards Goal  Goal: *Sick day guidelines  Outcome: Progressing Towards Goal  Goal: *Patient Specific Goal (EDIT GOAL, INSERT TEXT)  Outcome: Progressing Towards Goal     Problem: Patient Education: Go to Patient Education Activity  Goal: Patient/Family Education  Outcome: Progressing Towards Goal     Problem: Falls - Risk of  Goal: *Absence of Falls  Description  Document Ruben Brunner Fall Risk and appropriate interventions in the flowsheet. Outcome: Progressing Towards Goal  Note: Fall Risk Interventions:  Mobility Interventions: Patient to call before getting OOB         Medication Interventions: Evaluate medications/consider consulting pharmacy, Patient to call before getting OOB    Elimination Interventions: Call light in reach, Patient to call for help with toileting needs, Toileting schedule/hourly rounds              Problem: Patient Education: Go to Patient Education Activity  Goal: Patient/Family Education  Outcome: Progressing Towards Goal     Problem: Pain  Goal: *Control of Pain  Outcome: Progressing Towards Goal     Problem: Patient Education: Go to Patient Education Activity  Goal: Patient/Family Education  Outcome: Progressing Towards Goal     Problem: Pressure Injury - Risk of  Goal: *Prevention of pressure injury  Description  Document Moi Scale and appropriate interventions in the flowsheet. Outcome: Progressing Towards Goal  Note: Pressure Injury Interventions:             Activity Interventions: Increase time out of bed, Pressure redistribution bed/mattress(bed type)    Mobility Interventions: HOB 30 degrees or less, Pressure redistribution bed/mattress (bed type)    Nutrition Interventions: Document food/fluid/supplement intake                     Problem: Patient Education: Go to Patient Education Activity  Goal: Patient/Family Education  Outcome: Progressing Towards Goal

## 2020-02-16 NOTE — H&P
Medicine History and Physical    Patient: Freda Montero   Age:  78 y.o. Assessment   Active Problems:    Immunosuppressed status ([facility] (2/15/2020)      Sepsis ([facility] [address] [facility] (2/15/2020)          Plan     1)  Sepsis   -unknown cause at this point   - with SOB and cough considered PNA however xray negative, UA pending CTA pending   - cover with CAP IV abx for now   - follow bcxs, fever curve. - IVF    2)  IC from RA   - on methotrexate and prednisone   - continue   - may consider stress dose steriods if bp decreases    Echo for possible murmur- new    DISPO  -Pt to be admitted  at this time for reasons addressed above, continued hospitalization for ongoing assessment and treatment indicated     Anticipated Date of Discharge: 1-2 days  Anticipated Disposition (home, SNF) : home    Chief Complaint:   Chief Complaint   Patient presents with    Other         HPI:   Freda Montero is a 78y.o. year old female who presents with SOB x2 days. Hx of RA on immunosuppresents. She has DM and COPD on inhalers. Went to urgent care for the sob and chills , xray done and they states Left sided pna however this isnt obvious on our xray today[place] [address]. No urinary complaints. CTA and UA pending. Bcx pending. Review of Systems - positive responses in bold type   Constitutional: Negative for fever, chills, diaphoresis and unexpected weight change. HENT: Negative for ear pain, congestion, sore throat, rhinorrhea, drooling, trouble swallowing, neck pain and tinnitus. Eyes: Negative for photophobia, pain, redness and visual disturbance. Respiratory: negative for shortness of breath, cough, choking, chest tightness, wheezing or stridor. Cardiovascular: Negative for chest pain, palpitations and leg swelling. Gastrointestinal: Negative for nausea, vomiting, abdominal pain, diarrhea, constipation, blood in stool, abdominal distention and anal bleeding.    Genitourinary: Negative for dysuria, urgency, frequency, hematuria, flank pain and difficulty urinating. Musculoskeletal: Negative for back pain and arthralgias. Skin: Negative for color change, rash and wound. Neurological: Negative for dizziness, seizures, syncope, speech difficulty, light-headedness or headaches. Hematological: Does not bruise/bleed easily. Psychiatric/Behavioral: Negative for suicidal ideas, hallucinations, behavioral problems, self-injury or agitation       Past Medical History:  Past Medical History:   Diagnosis Date    Menopause        Past Surgical History:  History reviewed. No pertinent surgical history. Family History:  History reviewed. No pertinent family history. Social History:  Social History     Patient does not qualify to have social determinant information on file (likely too young). Socioeconomic History    Marital status:      Spouse name: Not on file    Number of children: Not on file    Years of education: Not on file    Highest education level: Not on file       Home Medications:  Prior to Admission medications    Medication Sig Start Date End Date Taking? Authorizing Provider   predniSONE (DELTASONE) 1 mg tablet Take  by mouth daily (with breakfast). Yes Randa, MD Daria   abatacept (ORENCIA) 250 mg injection by IntraVENous route once. Yes Randa, MD Daria   methotrexate (RHEUMATREX) 2.5 mg tablet Take  by mouth. 5mg takes on fridays   Yes Daria Tolentino MD   folic acid (FOLVITE) 1 mg tablet Take  by mouth daily. Yes Daria Tolentino MD   ibuprofen (MOTRIN) 200 mg tablet Take 800 mg by mouth every eight (8) hours as needed for Pain. Yes Randa, MD Daria   zoledronic acid (RECLAST) 5 mg/100 mL pgbk 5 mg by IntraVENous route once. Once per year   Yes Daria Tolentino MD   fluticasone propion-salmeteroL (ADVAIR DISKUS) 250-50 mcg/dose diskus inhaler Take 1 Puff by inhalation every twelve (12) hours. Yes Randa, MD Daria   montelukast (SINGULAIR) 10 mg tablet Take 10 mg by mouth daily.    Yes Daria Tolentino MD fluticasone propionate (FLONASE NA) by Nasal route. Yes Randa, MD Daria   albuterol (PROAIR HFA) 90 mcg/actuation inhaler Take  by inhalation. Yes Randa, MD Daria   loratadine (CLARITIN) 10 mg tablet Take 10 mg by mouth. Yes Other, MD Daria   FLUoxetine (PROZAC) 20 mg capsule Take  by mouth daily. Yes Other, MD Daria   omeprazole (PRILOSEC) 20 mg capsule Take 20 mg by mouth daily. Yes Randa, MD Daria   metFORMIN (GLUCOPHAGE) 500 mg tablet Take  by mouth two (2) times daily (with meals). Yes Other, MD Daria   busPIRone (BUSPAR) 7.5 mg tablet Take 7.5 mg by mouth three (3) times daily. Yes Randa, MD Daria   melatonin 5 mg tablet Take  by mouth nightly. 10mg prn at night   Yes Other, MD Daria       Allergies: Allergies   Allergen Reactions    Sulfa (Sulfonamide Antibiotics) Diarrhea           Physical Exam:     Visit Vitals  /62   Pulse (!) 107   Temp 100 °F (37.8 °C)   Resp 15   Ht 5' 2\" (1.575 m)   Wt 52.6 kg (116 lb)   SpO2 96%   BMI 21.22 kg/m²       Physical Exam:  General appearance: alert, cooperative, no distress, appears stated age  Head: Normocephalic, without obvious abnormality, atraumatic  Neck: supple, trachea midline  Lungs: b/l LL crackles  Heart: sinus tachy, soft murmur heard  Abdomen: soft, non-tender. Bowel sounds normal. No masses,  no organomegaly  Extremities: extremities normal, atraumatic, no cyanosis or edema  Skin: Skin color, texture, turgor normal. No rashes or lesions  Neurologic: Grossly normal    Intake and Output:  Current Shift:  No intake/output data recorded. Last three shifts:  No intake/output data recorded.     Lab/Data Reviewed:  CMP:   Lab Results   Component Value Date/Time     02/15/2020 04:59 PM    K 3.7 02/15/2020 04:59 PM     02/15/2020 04:59 PM    CO2 25 02/15/2020 04:59 PM    AGAP 9 02/15/2020 04:59 PM     (H) 02/15/2020 04:59 PM    BUN 14 02/15/2020 04:59 PM    CREA 0.94 02/15/2020 04:59 PM    GFRAA >60 02/15/2020 04:59 PM GFRNA 59 (L) 02/15/2020 04:59 PM    CA 9.5 02/15/2020 04:59 PM    ALB 3.8 02/15/2020 04:59 PM    TP 7.9 02/15/2020 04:59 PM    GLOB 4.1 (H) 02/15/2020 04:59 PM    AGRAT 0.9 02/15/2020 04:59 PM    SGOT 15 02/15/2020 04:59 PM    ALT 20 02/15/2020 04:59 PM     CBC:   Lab Results   Component Value Date/Time    WBC 20.4 (H) 02/15/2020 04:59 PM    HGB 10.9 (L) 02/15/2020 04:59 PM    HCT 33.9 (L) 02/15/2020 04:59 PM     (H) 02/15/2020 04:59 PM     All Cardiac Markers in the last 24 hours: No results found for: CPK, CK, CKMMB, CKMB, RCK3, CKMBT, CKNDX, CKND1, MARTIN, TROPT, TROIQ, JO, TROPT, TNIPOC, BNP, BNPP    Chest X-Ray is obtained; CXR reviewed independently -appears normal    Elvis Fisher MD    February 15, 2020

## 2020-02-16 NOTE — ED NOTES
TRANSFER - OUT REPORT:    Verbal report given to Ivan Cardoza(name) on Donita Art  being transferred to 2(unit) for routine progression of care       Report consisted of patients Situation, Background, Assessment and   Recommendations(SBAR). Information from the following report(s) SBAR, ED Summary, Procedure Summary and MAR was reviewed with the receiving nurse. Lines:   Peripheral IV 02/15/20 Right Antecubital (Active)   Site Assessment Clean, dry, & intact 2/15/2020  4:58 PM   Phlebitis Assessment 0 2/15/2020  4:58 PM   Infiltration Assessment 0 2/15/2020  4:58 PM   Dressing Status Clean, dry, & intact 2/15/2020  4:58 PM   Hub Color/Line Status Green 2/15/2020  4:58 PM        Opportunity for questions and clarification was provided.       Patient transported with:   Registered Nurse

## 2020-02-16 NOTE — PROGRESS NOTES
Internal Medicine Progress Note    Patient's Name: William Phipps Date: 2/15/2020  Length of Stay: 1      Assessment/Plan     Active Hospital Problems    Diagnosis Date Noted    Pneumonia 02/16/2020    Immunosuppressed status ([facility] [address] 02/15/2020    Sepsis ([facility] [address] 02/15/2020     - Afebrile overnight  - WBCs stable   - Cont IVAB  - Stop IV fluids  - Trend CBC  - Cont acceptable home medications for chronic conditions   - DVT protocol    I have personally reviewed all pertinent labs and films that have officially resulted over the last 24 hours. I have personally checked for all pending labs that are awaiting final results. Subjective     Pt s/e @ bedside  No major events overnight  Feeling better  Denies CP or SOB    Objective     Visit Vitals  /71   Pulse 87   Temp 100 °F (37.8 °C)   Resp 17   Ht 5' 2\" (1.575 m)   Wt 52.6 kg (116 lb)   SpO2 94%   BMI 21.22 kg/m²       Physical Exam:  General Appearance: NAD, conversant  Lungs: Bibasalar rhonchi with normal respiratory effort  CV: RRR, no m/r/g  Abdomen: soft, non-tender, normal bowel sounds  Extremities: no cyanosis, no peripheral edema  Neuro: No focal deficits, motor/sensory intact    Lab/Data Reviewed:  BMP:   Lab Results   Component Value Date/Time     02/16/2020 03:55 AM    K 3.7 02/16/2020 03:55 AM     02/16/2020 03:55 AM    CO2 28 02/16/2020 03:55 AM    AGAP 7 02/16/2020 03:55 AM     (H) 02/16/2020 03:55 AM    BUN 10 02/16/2020 03:55 AM    CREA 0.88 02/16/2020 03:55 AM    GFRAA >60 02/16/2020 03:55 AM    GFRNA >60 02/16/2020 03:55 AM     CBC:   Lab Results   Component Value Date/Time    WBC 20.0 (H) 02/16/2020 03:55 AM    HGB 9.4 (L) 02/16/2020 03:55 AM    HCT 29.7 (L) 02/16/2020 03:55 AM     (H) 02/16/2020 03:55 AM       Imaging Reviewed:  Xr Chest Sngl V    Result Date: 2/16/2020  EXAM: CHEST, PA CLINICAL INDICATION/HISTORY: cough   > Additional: None.  COMPARISON: 10/24/2018 TECHNIQUE: PA view of the chest was obtained. _______________ FINDINGS: HEART AND MEDIASTINUM: Cardiac silhouette is within normal range in size. Minimal calcified plaque is present at the arch. LUNGS AND PLEURAL SPACES: Pulmonary vessels are normal. Mild scattered fibrotic streaking is unchanged. No acute consolidation, pneumothorax or pleural effusion. BONY THORAX AND SOFT TISSUES: No acute osseous abnormality. No change. _______________     IMPRESSION: No active cardiopulmonary disease. Cta Chest W Or W Wo Cont    Result Date: 2/16/2020  EXAM: CTA chest INDICATION: Cough COMPARISON: Single view chest also done today. CT chest 6/4/2019 and 3/13/2019 TECHNIQUE: Axial CT imaging from the thoracic inlet through the diaphragm with intravenous contrast. Coronal and sagittal MIP reformats were generated. One or more dose reduction techniques were used on this CT: automated exposure control, adjustment of the mAs and/or kVp according to patient size, and iterative reconstruction techniques. The specific techniques used on this CT exam have been documented in the patient's electronic medical record. Digital Imaging and Communications in Medicine (DICOM) format image data are available to nonaffiliated external healthcare facilities or entities on a secure, media free, reciprocally searchable basis with patient authorization for at least a 12-month period after this study. _______________ FINDINGS: EXAM QUALITY: Adequate. PULMONARY ARTERIES: No evidence of pulmonary embolism. MEDIASTINUM: Normal heart size. No evidence of right heart strain. Aorta is unremarkable. No pericardial effusion. LUNGS: There is a pleural-based area of opacity in the posterior inferior right lower lobe which is new compared to prior exam. Likely this represents acute infiltrate consistent with pneumonia. Stable scarring left lower lobe. PLEURA: Normal. AIRWAY: There is bronchiectasis in the left lower lobe which is stable.  There is bronchial wall thickening with mucus plugging in the right lower lobe which is a change compared to prior exam and likely related to the presumed acute infiltrate. LYMPH NODES: There is new subcarinal adenopathy measuring up to 13 mm in the superior right paratracheal lymph node measuring 10 mm which is also new. Both are probably reactive. UPPER ABDOMEN: Unremarkable. OTHER: No acute or aggressive osseous abnormalities identified. _______________     IMPRESSION: 1. No evidence of pulmonary embolism. 2.  New pleural-based opacity posterior inferior right lower lobe likely represents acute infiltrate consistent with pneumonia. There is some adjacent bronchial wall thickening and mucus plugging. 3. Stable left lower lobe bronchiectasis. 4. New mediastinal adenopathy which is likely reactive. Recommend short-term follow-up chest CT in 3 months to reevaluate the presumed right lower lobe infiltrate and reactive adenopathy. Preliminary report was provided by radiology resident.        Medications Reviewed:  Current Facility-Administered Medications   Medication Dose Route Frequency    sodium chloride (NS) flush 5-10 mL  5-10 mL IntraVENous PRN    albuterol-ipratropium (DUO-NEB) 2.5 MG-0.5 MG/3 ML  3 mL Nebulization Q6H RT    busPIRone (BUSPAR) tablet 7.5 mg  7.5 mg Oral TID    FLUoxetine (PROzac) capsule 20 mg  20 mg Oral DAILY    folic acid (FOLVITE) tablet 1 mg  1 mg Oral DAILY    ibuprofen (MOTRIN) tablet 800 mg  800 mg Oral Q8H PRN    melatonin tablet 9 mg  9 mg Oral QHS PRN    loratadine (CLARITIN) tablet 10 mg  10 mg Oral DAILY PRN    [START ON 2/21/2020] methotrexate (RHEUMATREX) tablet 5 mg  5 mg Oral every Friday    montelukast (SINGULAIR) tablet 10 mg  10 mg Oral DAILY    pantoprazole (PROTONIX) tablet 20 mg  20 mg Oral ACB    predniSONE (DELTASONE) tablet 2 mg  2 mg Oral DAILY    cefTRIAXone (ROCEPHIN) 1 g in 0.9% sodium chloride (MBP/ADV) 50 mL MBP  1 g IntraVENous Q24H    azithromycin (ZITHROMAX) 500 mg in 0.9% sodium chloride 250 mL IVPB 500 mg IntraVENous Q24H    0.9% sodium chloride infusion  100 mL/hr IntraVENous CONTINUOUS    acetaminophen (TYLENOL) tablet 650 mg  650 mg Oral Q4H PRN    ondansetron (ZOFRAN) injection 4 mg  4 mg IntraVENous Q4H PRN    enoxaparin (LOVENOX) injection 40 mg  40 mg SubCUTAneous Q24H    insulin lispro (HUMALOG) injection   SubCUTAneous AC&HS    glucose chewable tablet 16 g  4 Tab Oral PRN    glucagon (GLUCAGEN) injection 1 mg  1 mg IntraMUSCular PRN           Carmen Miguel DO  Internal Medicine, Hospitalist  Pager: [phone] [address] [facility]

## 2020-02-16 NOTE — PROGRESS NOTES
Problem: Discharge Planning  Goal: *Discharge to safe environment  Outcome: Progressing Towards Goal  Plan is home     Reason for Admission:   Sepsis in immunocompromised state                   RUR Score:    9%                 Plan for utilizing home health:                          Current Advanced Directive/Advance Care Plan:                          Transition of Care Plan:    Chart reviewed and pt verified demographics. She lives with her  and is independent with ADL. She uses no DME. Her plan is to go home at dc. Patient has designated ___husband_____________________ to participate in his/her discharge plan and to receive any needed information. Name: Mirza Juarez  Address:  Phone number:   [phone]  - home number. Pt says her  usually does not have cell phone on him. Care Management Interventions  PCP Verified by CM: Yes(Dr Sinclair seen about 2 months ago)  Palliative Care Criteria Met (RRAT>21 & CHF Dx)?: No  Mode of Transport at Discharge: Other (see comment)()  Transition of Care Consult (CM Consult): Discharge Planning  Current Support Network: Lives with Spouse  Confirm Follow Up Transport: Family  Discharge Location  Discharge Placement: Home     Judy Green, BSN  [facility]  [phone], Pager [phone]  [email or web address]

## 2020-02-16 NOTE — PROGRESS NOTES
Problem: Gas Exchange - Impaired  Goal: *Absence of hypoxia  Outcome: Progressing Towards Goal   Respiratory Therapy Assessment Care Plan    Patient: Tiburcio Cho 78 y.o. female 2/16/2020 7:46 AM    Sepsis [facility]) [A41.9]      Chest X-RAY:   Results from Hospital Encounter encounter on 02/15/20   XR CHEST SNGL V    Impression IMPRESSION:    No active cardiopulmonary disease. Results from [place] encounter on 10/24/18   XR CHEST PA LAT    Impression IMPRESSION:  1. No radiographic evidence of acute cardiopulmonary process. 2. Evidence of perihilar cuffing which is characteristic of reactive airway  disease, bronchitis, or viral illness. NOTE:  I have personally reviewed the images and the final attending  interpretation is in concordance with the above resident report. Results from [place] encounter on 12/10/14   XR CHEST PA LAT    Impression Impression:    Most likely a parenchymal scar in the left lower lobe. No acute infiltrate.             Vital Signs:   Visit Vitals  /71 (BP 1 Location: Left arm, BP Patient Position: At rest)   Pulse (!) 103   Temp 99 °F (37.2 °C)   Resp 17   Ht 5' 2\" (1.575 m)   Wt 52.6 kg (116 lb)   SpO2 94%   BMI 21.22 kg/m²         Indications for treatment: COPD      Plan of care:[id number]        Goal:COPD Maintenance

## 2020-02-17 VITALS
BODY MASS INDEX: 22.08 KG/M2 | OXYGEN SATURATION: 91 % | SYSTOLIC BLOOD PRESSURE: 126 MMHG | HEART RATE: 94 BPM | WEIGHT: 120 LBS | RESPIRATION RATE: 18 BRPM | TEMPERATURE: 98.3 F | HEIGHT: 62 IN | DIASTOLIC BLOOD PRESSURE: 76 MMHG

## 2020-02-17 LAB
ANION GAP SERPL CALC-SCNC: 7 MMOL/L (ref 3–18)
ATRIAL RATE: 110 BPM
BUN SERPL-MCNC: 6 MG/DL (ref 7–18)
BUN/CREAT SERPL: 8 (ref 12–20)
CALCIUM SERPL-MCNC: 8.2 MG/DL (ref 8.5–10.1)
CALCULATED P AXIS, ECG09: 57 DEGREES
CALCULATED R AXIS, ECG10: 39 DEGREES
CALCULATED T AXIS, ECG11: 54 DEGREES
CHLORIDE SERPL-SCNC: 111 MMOL/L (ref 100–111)
CO2 SERPL-SCNC: 25 MMOL/L (ref 21–32)
CREAT SERPL-MCNC: 0.75 MG/DL (ref 0.6–1.3)
DIAGNOSIS, 93000: NORMAL
ERYTHROCYTE [DISTWIDTH] IN BLOOD BY AUTOMATED COUNT: 15.6 % (ref 11.6–14.5)
GLUCOSE BLD STRIP.AUTO-MCNC: 99 MG/DL (ref 70–110)
GLUCOSE SERPL-MCNC: 79 MG/DL (ref 74–99)
HCT VFR BLD AUTO: 27.7 % (ref 35–45)
HGB BLD-MCNC: 8.8 G/DL (ref 12–16)
MAGNESIUM SERPL-MCNC: 1.5 MG/DL (ref 1.6–2.6)
MCH RBC QN AUTO: 28.9 PG (ref 24–34)
MCHC RBC AUTO-ENTMCNC: 31.8 G/DL (ref 31–37)
MCV RBC AUTO: 90.8 FL (ref 74–97)
P-R INTERVAL, ECG05: 134 MS
PLATELET # BLD AUTO: 405 K/UL (ref 135–420)
PMV BLD AUTO: 9.6 FL (ref 9.2–11.8)
POTASSIUM SERPL-SCNC: 3.2 MMOL/L (ref 3.5–5.5)
Q-T INTERVAL, ECG07: 344 MS
QRS DURATION, ECG06: 76 MS
QTC CALCULATION (BEZET), ECG08: 465 MS
RBC # BLD AUTO: 3.05 M/UL (ref 4.2–5.3)
SODIUM SERPL-SCNC: 143 MMOL/L (ref 136–145)
VENTRICULAR RATE, ECG03: 110 BPM
WBC # BLD AUTO: 19.8 K/UL (ref 4.6–13.2)

## 2020-02-17 PROCEDURE — 74011636637 HC RX REV CODE- 636/637: Performed by: HOSPITALIST

## 2020-02-17 PROCEDURE — 94640 AIRWAY INHALATION TREATMENT: CPT

## 2020-02-17 PROCEDURE — 94761 N-INVAS EAR/PLS OXIMETRY MLT: CPT

## 2020-02-17 PROCEDURE — 80048 BASIC METABOLIC PNL TOTAL CA: CPT

## 2020-02-17 PROCEDURE — 36415 COLL VENOUS BLD VENIPUNCTURE: CPT

## 2020-02-17 PROCEDURE — 74011000250 HC RX REV CODE- 250: Performed by: HOSPITALIST

## 2020-02-17 PROCEDURE — 83735 ASSAY OF MAGNESIUM: CPT

## 2020-02-17 PROCEDURE — 82962 GLUCOSE BLOOD TEST: CPT

## 2020-02-17 PROCEDURE — 85027 COMPLETE CBC AUTOMATED: CPT

## 2020-02-17 PROCEDURE — 74011250637 HC RX REV CODE- 250/637: Performed by: HOSPITALIST

## 2020-02-17 RX ORDER — DOXYCYCLINE 100 MG/1
100 TABLET ORAL 2 TIMES DAILY
Qty: 14 TAB | Refills: 0 | Status: SHIPPED | OUTPATIENT
Start: 2020-02-17 | End: 2020-02-24

## 2020-02-17 RX ORDER — POTASSIUM CHLORIDE 20 MEQ/1
40 TABLET, EXTENDED RELEASE ORAL
Status: COMPLETED | OUTPATIENT
Start: 2020-02-17 | End: 2020-02-17

## 2020-02-17 RX ADMIN — FLUOXETINE 20 MG: 20 CAPSULE ORAL at 08:07

## 2020-02-17 RX ADMIN — ACETAMINOPHEN 650 MG: 325 TABLET, FILM COATED ORAL at 07:23

## 2020-02-17 RX ADMIN — PREDNISONE 2 MG: 1 TABLET ORAL at 08:07

## 2020-02-17 RX ADMIN — POTASSIUM CHLORIDE 40 MEQ: 1500 TABLET, EXTENDED RELEASE ORAL at 08:06

## 2020-02-17 RX ADMIN — PANTOPRAZOLE SODIUM 20 MG: 20 TABLET, DELAYED RELEASE ORAL at 06:55

## 2020-02-17 RX ADMIN — IPRATROPIUM BROMIDE AND ALBUTEROL SULFATE 3 ML: .5; 3 SOLUTION RESPIRATORY (INHALATION) at 07:55

## 2020-02-17 RX ADMIN — MONTELUKAST 10 MG: 10 TABLET, FILM COATED ORAL at 08:07

## 2020-02-17 RX ADMIN — BUSPIRONE HYDROCHLORIDE 7.5 MG: 7.5 TABLET ORAL at 08:07

## 2020-02-17 RX ADMIN — IPRATROPIUM BROMIDE AND ALBUTEROL SULFATE 3 ML: .5; 3 SOLUTION RESPIRATORY (INHALATION) at 02:01

## 2020-02-17 RX ADMIN — FOLIC ACID 1 MG: 1 TABLET ORAL at 08:07

## 2020-02-17 RX ADMIN — IBUPROFEN 800 MG: 400 TABLET ORAL at 00:31

## 2020-02-17 NOTE — PROGRESS NOTES
Problem: Discharge Planning  Goal: *Discharge to safe environment  Outcome: resolved/met  Plan is home     Pt dc'd home, no services ordered. Care Management Interventions  PCP Verified by CM: Yes(Dr Sinclair seen about 2 months ago)  Palliative Care Criteria Met (RRAT>21 & CHF Dx)?: No  Mode of Transport at Discharge:  Other (see comment)()  Transition of Care Consult (CM Consult): Discharge Planning  Current Support Network: Lives with Spouse  Confirm Follow Up Transport: Family  Discharge Location  Discharge Placement: Home

## 2020-02-17 NOTE — DISCHARGE INSTRUCTIONS
Patient Education        Learning About COPD and How to Prevent Lung Infections  How do lung infections affect COPD? Lung infections like pneumonia and acute bronchitis are common causes of COPD flare-ups. And people who have COPD are more likely to get these lung infections, especially if they smoke. When you have COPD, it is important to know the symptoms of pneumonia and acute bronchitis and call your doctor if you have them. Symptoms include:  · A cough that brings up more mucus than usual.  · Fever. · Shortness of breath. What can you do to prevent these infections? Stay healthy  · Get a flu shot every year. · Get a pneumococcal vaccine shot. If you have had one before, ask your doctor whether you need another dose. Two different types of pneumococcal vaccines are recommended for people ages 72 and older. · If you must be around people with colds or the flu, wash your hands often. · Do not smoke. This is the most important step you can take to prevent more damage to your lungs. If you need help quitting, talk to your doctor about stop-smoking programs and medicines. These can increase your chances of quitting for good. · Avoid secondhand smoke, air pollution, and high altitudes. Also avoid cold, dry air and hot, humid air. Stay at home with your windows closed when air pollution is bad. Exercise and eat well  · If your doctor recommends it, get more exercise. Walking is a good choice. Bit by bit, increase the amount you walk every day. Try for at least 30 minutes on most days of the week. · Eat regular, well-balanced meals. Eating right keeps your energy levels up and helps your body fight infection. · Get plenty of rest and sleep. Follow-up care is a key part of your treatment and safety. Be sure to make and go to all appointments, and call your doctor if you are having problems. It's also a good idea to know your test results and keep a list of the medicines you take.   Where can you learn more?  Go to http://oliver-alaina.info/. Enter D101 in the search box to learn more about \"Learning About COPD and How to Prevent Lung Infections. \"  Current as of: June 9, 2019  Content Version: 12.2  © 2627-1132 [employer]. Care instructions adapted under license by [employer]. (which disclaims liability or warranty for this information). If you have questions about a medical condition or this instruction, always ask your healthcare professional. [address] any warranty or liability for your use of this information. DISCHARGE SUMMARY from Nurse    PATIENT INSTRUCTIONS:    After general anesthesia or intravenous sedation, for 24 hours or while taking prescription Narcotics:  · Limit your activities  · Do not drive and operate hazardous machinery  · Do not make important personal or business decisions  · Do  not drink alcoholic beverages  · If you have not urinated within 8 hours after discharge, please contact your surgeon on call. Report the following to your surgeon:  · Excessive pain, swelling, redness or odor of or around the surgical area  · Temperature over 100.5  · Nausea and vomiting lasting longer than 4 hours or if unable to take medications  · Any signs of decreased circulation or nerve impairment to extremity: change in color, persistent  numbness, tingling, coldness or increase pain  · Any questions    What to do at Home:  Recommended activity: Activity as tolerated,     If you experience any of the following symptoms list on your discharge summary, please follow up with PCP. *  Please give a list of your current medications to your Primary Care Provider. *  Please update this list whenever your medications are discontinued, doses are      changed, or new medications (including over-the-counter products) are added. *  Please carry medication information at all times in case of emergency situations.     These are general instructions for a healthy lifestyle:    No smoking/ No tobacco products/ Avoid exposure to second hand smoke  Surgeon General's Warning:  Quitting smoking now greatly reduces serious risk to your health. Obesity, smoking, and sedentary lifestyle greatly increases your risk for illness    A healthy diet, regular physical exercise & weight monitoring are important for maintaining a healthy lifestyle    You may be retaining fluid if you have a history of heart failure or if you experience any of the following symptoms:  Weight gain of 3 pounds or more overnight or 5 pounds in a week, increased swelling in our hands or feet or shortness of breath while lying flat in bed. Please call your doctor as soon as you notice any of these symptoms; do not wait until your next office visit. Patient armband removed and shredded    The discharge information has been reviewed with the patient. The patient verbalized understanding. Discharge medications reviewed with the patient and appropriate educational materials and side effects teaching were provided.   ___________________________________________________________________________________________________________________________________

## 2020-02-17 NOTE — PROGRESS NOTES
2003: Patient in bed awake,alert and oriented x 4. No signs of distress. Bed low and locked. Call bell within reach. Will continue monitoring. 3318: In bed resting quietly,uneventful night,no other concern at this time,call bell within reach,Will continue monitoring. Patient Vitals for the past 12 hrs:   Temp Pulse Resp BP SpO2   02/17/20 0328 98.9 °F (37.2 °C) 90 18 120/89 96 %   02/17/20 0201     96 %   02/17/20 0031 98.6 °F (37 °C) 90 20 113/67 95 %   02/16/20 2109 98.9 °F (37.2 °C) 99 18 115/64 95 %   02/16/20 1958     96 %     Bedside and Verbal shift change report given to [id number] (oncoming nurse) by Tiffanie Orr (offgoing nurse). Report included the following information SBAR, Kardex, MAR and Recent Results.

## 2020-02-17 NOTE — DISCHARGE SUMMARY
Internal Medicine Discharge Summary        Patient: Christiano Dao    YOB: 1950    Age:  78 y.o. Admit Date: 2/15/2020    Discharge Date: 2/17/2020    LOS:  LOS: 2 days     Discharge To: Home    Consults: None    Admission Diagnoses: Sepsis ([facility] [A41.9]    Discharge Diagnoses:    Problem List as of 2/17/2020 Never Reviewed          Codes Class Noted - Resolved    Pneumonia ICD-10-CM: J18.9  ICD-9-CM: 701  2/16/2020 - Present        Immunosuppressed status ([facility] ICD-10-CM: D89.9  ICD-9-CM: 279.9  2/15/2020 - Present        * (Principal) Sepsis ([facility] ICD-10-CM: A41.9  ICD-9-CM: 038.9, 995.91  2/15/2020 - Present              Discharge Condition:  Improved    Procedures: None         HPI: Christiano Dao is a 78y.o. year old female who presents with SOB x2 days. Hx of RA on immunosuppresents. She has DM and COPD on inhalers. Went to urgent care for the sob and chills , xray done and they states Left sided pna however this isnt obvious on our xray today[place] [address]. No urinary complaints. CTA and UA pending. Bcx pending    Hospital Course:    Sepsis 2/2 CAP - Treated with IV rocephin and azithromycin. Patient improved over the next 48 hours. She remained afebrile. White count down slightly although patient on chronic steroids. The patient was very eager to get back home and was feeling well enough and looking well enough to transition to oral doxycycline to complete pneumonia treatment. The rest of the patient's chronic conditions were managed appropriately during their admission. They were medically stable at the time of discharge.     Visit Vitals  /76 (BP 1 Location: Left arm, BP Patient Position: At rest)   Pulse 94   Temp 98.3 °F (36.8 °C)   Resp 18   Ht 5' 2\" (1.575 m)   Wt 54.4 kg (120 lb)   SpO2 91%   BMI 21.95 kg/m²       Physical Exam at Discharge:  General Appearance: NAD, conversant  HENT: normocephalic/atraumatic, moist mucus membranes  Lungs: Rhonchi at basese with normal respiratory effort  CV: RRR, no m/r/g  Abdomen: soft, non-tender, normal bowel sounds  Extremities: no cyanosis, no peripheral edema  Neuro: moves all extremities, no focal deficits  Psych: appropriate affect, alert and oriented to person, place and time    Labs Prior to Discharge:  Labs: Results:       Chemistry Recent Labs     02/17/20  0415 02/16/20  0355 02/15/20  1659   GLU 79 108* 139*    143 136   K 3.2* 3.7 3.7    108 102   CO2 25 28 25   BUN 6* 10 14   CREA 0.75 0.88 0.94   CA 8.2* 8.6 9.5   AGAP 7 7 9   BUCR 8* 11* 15   AP  --   --  61   TP  --   --  7.9   ALB  --   --  3.8   GLOB  --   --  4.1*   AGRAT  --   --  0.9      CBC w/Diff Recent Labs     02/17/20  0415 02/16/20  0355 02/15/20  1659   WBC 19.8* 20.0* 20.4*   RBC 3.05* 3.29* 3.83*   HGB 8.8* 9.4* 10.9*   HCT 27.7* 29.7* 33.9*    429* 503*   GRANS  --   --  83*   LYMPH  --   --  7*   EOS  --   --  0      Cardiac Enzymes No results for input(s): CPK, CKND1, MARTIN in the last 72 hours. No lab exists for component: CKRMB, TROIP   Coagulation No results for input(s): PTP, INR, APTT, INREXT in the last 72 hours. Lipid Panel Lab Results   Component Value Date/Time    Cholesterol, total 235 (H) 12/04/2009 12:06 PM    HDL Cholesterol 72 (H) 12/04/2009 12:06 PM    LDL, calculated 133.8 (H) 12/04/2009 12:06 PM    VLDL, calculated 29.2 12/04/2009 12:06 PM    Triglyceride 146 12/04/2009 12:06 PM    CHOL/HDL Ratio 3.3 12/04/2009 12:06 PM      BNP No results for input(s): BNPP in the last 72 hours. Liver Enzymes Recent Labs     02/15/20  1659   TP 7.9   ALB 3.8   AP 61   SGOT 15      Thyroid Studies No results found for: T4, T3U, TSH, TSHEXT         Significant Imaging:  Xr Chest Sngl V    Result Date: 2/16/2020  EXAM: CHEST, PA CLINICAL INDICATION/HISTORY: cough   > Additional: None.  COMPARISON: 10/24/2018 TECHNIQUE: PA view of the chest was obtained. _______________ FINDINGS: HEART AND MEDIASTINUM: Cardiac silhouette is within normal range in size. Minimal calcified plaque is present at the arch. LUNGS AND PLEURAL SPACES: Pulmonary vessels are normal. Mild scattered fibrotic streaking is unchanged. No acute consolidation, pneumothorax or pleural effusion. BONY THORAX AND SOFT TISSUES: No acute osseous abnormality. No change. _______________     IMPRESSION: No active cardiopulmonary disease. Cta Chest W Or W Wo Cont    Result Date: 2/16/2020  EXAM: CTA chest INDICATION: Cough COMPARISON: Single view chest also done today. CT chest 6/4/2019 and 3/13/2019 TECHNIQUE: Axial CT imaging from the thoracic inlet through the diaphragm with intravenous contrast. Coronal and sagittal MIP reformats were generated. One or more dose reduction techniques were used on this CT: automated exposure control, adjustment of the mAs and/or kVp according to patient size, and iterative reconstruction techniques. The specific techniques used on this CT exam have been documented in the patient's electronic medical record. Digital Imaging and Communications in Medicine (DICOM) format image data are available to nonaffiliated external healthcare facilities or entities on a secure, media free, reciprocally searchable basis with patient authorization for at least a 12-month period after this study. _______________ FINDINGS: EXAM QUALITY: Adequate. PULMONARY ARTERIES: No evidence of pulmonary embolism. MEDIASTINUM: Normal heart size. No evidence of right heart strain. Aorta is unremarkable. No pericardial effusion. LUNGS: There is a pleural-based area of opacity in the posterior inferior right lower lobe which is new compared to prior exam. Likely this represents acute infiltrate consistent with pneumonia. Stable scarring left lower lobe. PLEURA: Normal. AIRWAY: There is bronchiectasis in the left lower lobe which is stable.  There is bronchial wall thickening with mucus plugging in the right lower lobe which is a change compared to prior exam and likely related to the presumed acute infiltrate. LYMPH NODES: There is new subcarinal adenopathy measuring up to 13 mm in the superior right paratracheal lymph node measuring 10 mm which is also new. Both are probably reactive. UPPER ABDOMEN: Unremarkable. OTHER: No acute or aggressive osseous abnormalities identified. _______________     IMPRESSION: 1. No evidence of pulmonary embolism. 2.  New pleural-based opacity posterior inferior right lower lobe likely represents acute infiltrate consistent with pneumonia. There is some adjacent bronchial wall thickening and mucus plugging. 3. Stable left lower lobe bronchiectasis. 4. New mediastinal adenopathy which is likely reactive. Recommend short-term follow-up chest CT in 3 months to reevaluate the presumed right lower lobe infiltrate and reactive adenopathy. Preliminary report was provided by radiology resident. Discharge Medications:     Discharge Medication List as of 2/17/2020 10:30 AM      START taking these medications    Details   doxycycline (ADOXA) 100 mg tablet Take 1 Tab by mouth two (2) times a day for 7 days. , Normal, Disp-14 Tab, R-0         CONTINUE these medications which have NOT CHANGED    Details   predniSONE (DELTASONE) 1 mg tablet Take  by mouth daily (with breakfast). , Historical Med      abatacept (ORENCIA) 250 mg injection by IntraVENous route once., Historical Med      methotrexate (RHEUMATREX) 2.5 mg tablet Take  by mouth. 5mg takes on fridays, Historical Med      folic acid (FOLVITE) 1 mg tablet Take  by mouth daily. , Historical Med      zoledronic acid (RECLAST) 5 mg/100 mL pgbk 5 mg by IntraVENous route once. Once per year, Historical Med      fluticasone propion-salmeteroL (ADVAIR DISKUS) 250-50 mcg/dose diskus inhaler Take 1 Puff by inhalation every twelve (12) hours. , Historical Med      montelukast (SINGULAIR) 10 mg tablet Take 10 mg by mouth daily. , Historical Med      albuterol (PROAIR HFA) 90 mcg/actuation inhaler Take  by inhalation. , Historical Med      loratadine (CLARITIN) 10 mg tablet Take 10 mg by mouth., Historical Med      FLUoxetine (PROZAC) 20 mg capsule Take  by mouth daily. , Historical Med      omeprazole (PRILOSEC) 20 mg capsule Take 20 mg by mouth daily. , Historical Med      metFORMIN (GLUCOPHAGE) 500 mg tablet Take  by mouth two (2) times daily (with meals). , Historical Med      busPIRone (BUSPAR) 7.5 mg tablet Take 7.5 mg by mouth three (3) times daily. , Historical Med      melatonin 5 mg tablet Take  by mouth nightly. 10mg prn at night, Historical Med         STOP taking these medications       ibuprofen (MOTRIN) 200 mg tablet Comments:   Reason for Stopping:         fluticasone propionate (FLONASE NA) Comments:   Reason for Stopping:               Activity: Activity as tolerated    Diet: Resume previous diet    Wound Care: None needed    Follow-up:   Please follow up with your PCP within 7 days to discuss your recent hospitalization. Patient to arrange.          Total time spent including time spent on final examination and discharge discussion, discharge documentation and records reviewed and medication reconciliation: > 30 minutes    Milagros Navarro DO  Internal Medicine, Hospitalist  Pager: [facility]

## 2020-02-17 NOTE — PROGRESS NOTES
conducted an initial consultation and Spiritual Assessment for Tiburcio Cho, who is a 78 y.o.,female. Patients Primary Language is: [place]. According to the patients EMR Worship Affiliation is: [place]. The reason the Patient came to the hospital is:   Patient Active Problem List    Diagnosis Date Noted    Pneumonia 02/16/2020    Immunosuppressed status ([facility] 02/15/2020    Sepsis ([facility] 02/15/2020        The  provided the following Interventions:  Initiated a relationship of care and support with patient in room 2210 this morning  Listened empathically as patient talked about her being here and how she was hoping to go home this afternoon. Patient asked for prayer which was given. Offered prayer and assurance of continued prayers on patients behalf. The following outcomes were achieved:  Patient shared limited information about her medical narrative and spiritual journey/beliefs. Assessment:  Patient does not have any Worship/cultural needs that will affect patients preferences in health care. There are no further spiritual or Worship issues which require Spiritual Care Services interventions at this time. Plan:  Chaplains will continue to follow and will provide pastoral care on an as needed/requested basis    . Penny Baltimore   Spiritual Care   [phone]

## 2020-02-17 NOTE — PROGRESS NOTES
Patient discharge; no distress noted, accompanied by this nurse via wheelchair to front entrance to car. This nurse reviewed discharged instruction with patient.

## 2020-02-17 NOTE — PROGRESS NOTES
Problem: Diabetes Self-Management  Goal: *Disease process and treatment process  Description  Define diabetes and identify own type of diabetes; list 3 options for treating diabetes. Outcome: Progressing Towards Goal  Goal: *Incorporating nutritional management into lifestyle  Description  Describe effect of type, amount and timing of food on blood glucose; list 3 methods for planning meals. Outcome: Progressing Towards Goal  Goal: *Incorporating physical activity into lifestyle  Description  State effect of exercise on blood glucose levels. Outcome: Progressing Towards Goal  Goal: *Developing strategies to promote health/change behavior  Description  Define the ABC's of diabetes; identify appropriate screenings, schedule and personal plan for screenings. Outcome: Progressing Towards Goal  Goal: *Using medications safely  Description  State effect of diabetes medications on diabetes; name diabetes medication taking, action and side effects. Outcome: Progressing Towards Goal  Goal: *Monitoring blood glucose, interpreting and using results  Description  Identify recommended blood glucose targets  and personal targets. Outcome: Progressing Towards Goal  Goal: *Prevention, detection, treatment of acute complications  Description  List symptoms of hyper- and hypoglycemia; describe how to treat low blood sugar and actions for lowering  high blood glucose level. Outcome: Progressing Towards Goal  Goal: *Prevention, detection and treatment of chronic complications  Description  Define the natural course of diabetes and describe the relationship of blood glucose levels to long term complications of diabetes.   Outcome: Progressing Towards Goal  Goal: *Developing strategies to address psychosocial issues  Description  Describe feelings about living with diabetes; identify support needed and support network  Outcome: Progressing Towards Goal  Goal: *Insulin pump training  Outcome: Progressing Towards Goal  Goal: *Sick day guidelines  Outcome: Progressing Towards Goal  Goal: *Patient Specific Goal (EDIT GOAL, INSERT TEXT)  Outcome: Progressing Towards Goal     Problem: Patient Education: Go to Patient Education Activity  Goal: Patient/Family Education  Outcome: Progressing Towards Goal     Problem: Falls - Risk of  Goal: *Absence of Falls  Description  Document [place] Maldonado Fall Risk and appropriate interventions in the flowsheet. Outcome: Progressing Towards Goal  Note: Fall Risk Interventions:  Mobility Interventions: Patient to call before getting OOB         Medication Interventions: Patient to call before getting OOB, Teach patient to arise slowly    Elimination Interventions: Call light in reach, Patient to call for help with toileting needs              Problem: Patient Education: Go to Patient Education Activity  Goal: Patient/Family Education  Outcome: Progressing Towards Goal     Problem: Pain  Goal: *Control of Pain  Outcome: Progressing Towards Goal     Problem: Patient Education: Go to Patient Education Activity  Goal: Patient/Family Education  Outcome: Progressing Towards Goal     Problem: Pressure Injury - Risk of  Goal: *Prevention of pressure injury  Description  Document Moi Scale and appropriate interventions in the flowsheet. Outcome: Progressing Towards Goal  Note: Pressure Injury Interventions:             Activity Interventions: Pressure redistribution bed/mattress(bed type), Increase time out of bed    Mobility Interventions: HOB 30 degrees or less, Pressure redistribution bed/mattress (bed type)    Nutrition Interventions: Document food/fluid/supplement intake                     Problem: Patient Education: Go to Patient Education Activity  Goal: Patient/Family Education  Outcome: Progressing Towards Goal     Problem: Gas Exchange - Impaired  Goal: *Absence of hypoxia  Outcome: Progressing Towards Goal     Problem: Discharge Planning  Goal: *Discharge to safe environment  Outcome: Progressing Towards Goal

## 2020-02-17 NOTE — PROGRESS NOTES
Assumed care of patient from [address]. Patient awake in bed. A & O x4, denies pain. Call bell and frequently used items within reach. HOB elevated and locked at lowest position. Honorio Gamino

## 2020-02-21 LAB
BACTERIA SPEC CULT: NORMAL
BACTERIA SPEC CULT: NORMAL
SERVICE CMNT-IMP: NORMAL
SERVICE CMNT-IMP: NORMAL

## 2020-05-04 ENCOUNTER — APPOINTMENT (OUTPATIENT)
Dept: GENERAL RADIOLOGY | Age: 70
DRG: 872 | End: 2020-05-04
Attending: EMERGENCY MEDICINE
Payer: MEDICARE

## 2020-05-04 ENCOUNTER — HOSPITAL ENCOUNTER (INPATIENT)
Age: 70
LOS: 3 days | Discharge: HOME | DRG: 872 | End: 2020-05-07
Attending: EMERGENCY MEDICINE | Admitting: INTERNAL MEDICINE
Payer: MEDICARE

## 2020-05-04 DIAGNOSIS — J18.9 PNEUMONIA OF LEFT LOWER LOBE DUE TO INFECTIOUS ORGANISM: Primary | ICD-10-CM

## 2020-05-04 DIAGNOSIS — A41.9 SEPSIS, DUE TO UNSPECIFIED ORGANISM, UNSPECIFIED WHETHER ACUTE ORGAN DYSFUNCTION PRESENT (HCC): ICD-10-CM

## 2020-05-04 PROBLEM — E86.0 DEHYDRATION: Status: ACTIVE | Noted: 2020-05-04

## 2020-05-04 PROBLEM — J44.1 CHRONIC OBSTRUCTIVE PULMONARY DISEASE WITH ACUTE EXACERBATION (HCC): Status: ACTIVE | Noted: 2020-05-04

## 2020-05-04 PROBLEM — E83.42 HYPOMAGNESEMIA: Status: ACTIVE | Noted: 2020-05-04

## 2020-05-04 PROBLEM — D72.829 ELEVATED WBC COUNT: Status: ACTIVE | Noted: 2020-05-04

## 2020-05-04 PROBLEM — E11.9 DM (DIABETES MELLITUS) (HCC): Status: ACTIVE | Noted: 2020-05-04

## 2020-05-04 LAB
ANION GAP SERPL CALC-SCNC: 13 MMOL/L (ref 3–18)
APPEARANCE UR: CLEAR
BACTERIA URNS QL MICRO: NEGATIVE /HPF
BASOPHILS # BLD: 0 K/UL (ref 0–0.1)
BASOPHILS NFR BLD: 0 % (ref 0–2)
BILIRUB UR QL: NEGATIVE
BUN SERPL-MCNC: 23 MG/DL (ref 7–18)
BUN/CREAT SERPL: 15 (ref 12–20)
CALCIUM SERPL-MCNC: 9 MG/DL (ref 8.5–10.1)
CHLORIDE SERPL-SCNC: 99 MMOL/L (ref 100–111)
CK MB CFR SERPL CALC: NORMAL % (ref 0–4)
CK MB SERPL-MCNC: <1 NG/ML (ref 5–25)
CK SERPL-CCNC: 45 U/L (ref 26–192)
CO2 SERPL-SCNC: 22 MMOL/L (ref 21–32)
COLOR UR: YELLOW
CREAT SERPL-MCNC: 1.5 MG/DL (ref 0.6–1.3)
DIFFERENTIAL METHOD BLD: ABNORMAL
EOSINOPHIL # BLD: 0 K/UL (ref 0–0.4)
EOSINOPHIL NFR BLD: 0 % (ref 0–5)
EPITH CASTS URNS QL MICRO: NORMAL /LPF (ref 0–5)
ERYTHROCYTE [DISTWIDTH] IN BLOOD BY AUTOMATED COUNT: 14.8 % (ref 11.6–14.5)
GLUCOSE BLD STRIP.AUTO-MCNC: 114 MG/DL (ref 70–110)
GLUCOSE SERPL-MCNC: 106 MG/DL (ref 74–99)
GLUCOSE UR STRIP.AUTO-MCNC: NEGATIVE MG/DL
HCT VFR BLD AUTO: 32.1 % (ref 35–45)
HGB BLD-MCNC: 10.4 G/DL (ref 12–16)
HGB UR QL STRIP: NEGATIVE
KETONES UR QL STRIP.AUTO: ABNORMAL MG/DL
L PNEUMO AG UR QL IA: NEGATIVE
LACTATE SERPL-SCNC: 1 MMOL/L (ref 0.4–2)
LEUKOCYTE ESTERASE UR QL STRIP.AUTO: NEGATIVE
LYMPHOCYTES # BLD: 1.5 K/UL (ref 0.9–3.6)
LYMPHOCYTES NFR BLD: 8 % (ref 21–52)
MAGNESIUM SERPL-MCNC: 1.2 MG/DL (ref 1.6–2.6)
MCH RBC QN AUTO: 28.3 PG (ref 24–34)
MCHC RBC AUTO-ENTMCNC: 32.4 G/DL (ref 31–37)
MCV RBC AUTO: 87.2 FL (ref 74–97)
MONOCYTES # BLD: 1.6 K/UL (ref 0.05–1.2)
MONOCYTES NFR BLD: 9 % (ref 3–10)
NEUTS SEG # BLD: 15.5 K/UL (ref 1.8–8)
NEUTS SEG NFR BLD: 83 % (ref 40–73)
NITRITE UR QL STRIP.AUTO: NEGATIVE
PH UR STRIP: 5.5 [PH] (ref 5–8)
PLATELET # BLD AUTO: 738 K/UL (ref 135–420)
PMV BLD AUTO: 9.4 FL (ref 9.2–11.8)
POTASSIUM SERPL-SCNC: 3.6 MMOL/L (ref 3.5–5.5)
PROCALCITONIN SERPL-MCNC: 20.84 NG/ML
PROT UR STRIP-MCNC: ABNORMAL MG/DL
RBC # BLD AUTO: 3.68 M/UL (ref 4.2–5.3)
RBC #/AREA URNS HPF: NORMAL /HPF (ref 0–5)
S PNEUM AG UR QL: NEGATIVE
SODIUM SERPL-SCNC: 134 MMOL/L (ref 136–145)
SP GR UR REFRACTOMETRY: 1.02 (ref 1–1.03)
TROPONIN I SERPL-MCNC: <0.02 NG/ML (ref 0–0.04)
UROBILINOGEN UR QL STRIP.AUTO: 0.2 EU/DL (ref 0.2–1)
WBC # BLD AUTO: 18.7 K/UL (ref 4.6–13.2)
WBC URNS QL MICRO: NORMAL /HPF (ref 0–4)

## 2020-05-04 PROCEDURE — 71045 X-RAY EXAM CHEST 1 VIEW: CPT

## 2020-05-04 PROCEDURE — 87635 SARS-COV-2 COVID-19 AMP PRB: CPT

## 2020-05-04 PROCEDURE — 96365 THER/PROPH/DIAG IV INF INIT: CPT

## 2020-05-04 PROCEDURE — 93005 ELECTROCARDIOGRAM TRACING: CPT

## 2020-05-04 PROCEDURE — 94640 AIRWAY INHALATION TREATMENT: CPT

## 2020-05-04 PROCEDURE — 85025 COMPLETE CBC W/AUTO DIFF WBC: CPT

## 2020-05-04 PROCEDURE — 82962 GLUCOSE BLOOD TEST: CPT

## 2020-05-04 PROCEDURE — 74011000250 HC RX REV CODE- 250: Performed by: INTERNAL MEDICINE

## 2020-05-04 PROCEDURE — 83605 ASSAY OF LACTIC ACID: CPT

## 2020-05-04 PROCEDURE — 82550 ASSAY OF CK (CPK): CPT

## 2020-05-04 PROCEDURE — 74011250637 HC RX REV CODE- 250/637

## 2020-05-04 PROCEDURE — 83735 ASSAY OF MAGNESIUM: CPT

## 2020-05-04 PROCEDURE — 74011250636 HC RX REV CODE- 250/636: Performed by: EMERGENCY MEDICINE

## 2020-05-04 PROCEDURE — 74011250637 HC RX REV CODE- 250/637: Performed by: INTERNAL MEDICINE

## 2020-05-04 PROCEDURE — 81001 URINALYSIS AUTO W/SCOPE: CPT

## 2020-05-04 PROCEDURE — 65660000000 HC RM CCU STEPDOWN

## 2020-05-04 PROCEDURE — 80048 BASIC METABOLIC PNL TOTAL CA: CPT

## 2020-05-04 PROCEDURE — 87449 NOS EACH ORGANISM AG IA: CPT

## 2020-05-04 PROCEDURE — 84145 PROCALCITONIN (PCT): CPT

## 2020-05-04 PROCEDURE — 99285 EMERGENCY DEPT VISIT HI MDM: CPT

## 2020-05-04 PROCEDURE — 87040 BLOOD CULTURE FOR BACTERIA: CPT

## 2020-05-04 PROCEDURE — 74011636637 HC RX REV CODE- 636/637: Performed by: INTERNAL MEDICINE

## 2020-05-04 PROCEDURE — 87450 LEGIONELLA PNEUMOPHILA AG, URINE: CPT

## 2020-05-04 PROCEDURE — 74011250636 HC RX REV CODE- 250/636: Performed by: INTERNAL MEDICINE

## 2020-05-04 RX ORDER — PANTOPRAZOLE SODIUM 40 MG/1
40 TABLET, DELAYED RELEASE ORAL
Status: DISCONTINUED | OUTPATIENT
Start: 2020-05-05 | End: 2020-05-07 | Stop reason: HOSPADM

## 2020-05-04 RX ORDER — LEVOFLOXACIN 5 MG/ML
750 INJECTION, SOLUTION INTRAVENOUS
Status: DISCONTINUED | OUTPATIENT
Start: 2020-05-04 | End: 2020-05-07 | Stop reason: HOSPADM

## 2020-05-04 RX ORDER — HEPARIN SODIUM 5000 [USP'U]/ML
5000 INJECTION, SOLUTION INTRAVENOUS; SUBCUTANEOUS EVERY 8 HOURS
Status: DISCONTINUED | OUTPATIENT
Start: 2020-05-04 | End: 2020-05-07 | Stop reason: HOSPADM

## 2020-05-04 RX ORDER — BUDESONIDE 0.5 MG/2ML
500 INHALANT ORAL
Status: DISCONTINUED | OUTPATIENT
Start: 2020-05-04 | End: 2020-05-07 | Stop reason: HOSPADM

## 2020-05-04 RX ORDER — LANOLIN ALCOHOL/MO/W.PET/CERES
5 CREAM (GRAM) TOPICAL
Status: DISCONTINUED | OUTPATIENT
Start: 2020-05-04 | End: 2020-05-07 | Stop reason: HOSPADM

## 2020-05-04 RX ORDER — FLUOXETINE 10 MG/1
10 CAPSULE ORAL DAILY
Status: DISCONTINUED | OUTPATIENT
Start: 2020-05-05 | End: 2020-05-07 | Stop reason: HOSPADM

## 2020-05-04 RX ORDER — SODIUM CHLORIDE 0.9 % (FLUSH) 0.9 %
5-10 SYRINGE (ML) INJECTION AS NEEDED
Status: DISCONTINUED | OUTPATIENT
Start: 2020-05-04 | End: 2020-05-07 | Stop reason: HOSPADM

## 2020-05-04 RX ORDER — METHOTREXATE 2.5 MG/1
5 TABLET ORAL
Status: DISCONTINUED | OUTPATIENT
Start: 2020-05-08 | End: 2020-05-07 | Stop reason: HOSPADM

## 2020-05-04 RX ORDER — SODIUM CHLORIDE 0.9 % (FLUSH) 0.9 %
5-40 SYRINGE (ML) INJECTION EVERY 8 HOURS
Status: DISCONTINUED | OUTPATIENT
Start: 2020-05-04 | End: 2020-05-07 | Stop reason: HOSPADM

## 2020-05-04 RX ORDER — MONTELUKAST SODIUM 10 MG/1
10 TABLET ORAL DAILY
Status: DISCONTINUED | OUTPATIENT
Start: 2020-05-05 | End: 2020-05-07 | Stop reason: HOSPADM

## 2020-05-04 RX ORDER — ARFORMOTEROL TARTRATE 15 UG/2ML
15 SOLUTION RESPIRATORY (INHALATION)
Status: DISCONTINUED | OUTPATIENT
Start: 2020-05-04 | End: 2020-05-07 | Stop reason: HOSPADM

## 2020-05-04 RX ORDER — IPRATROPIUM BROMIDE AND ALBUTEROL SULFATE 2.5; .5 MG/3ML; MG/3ML
3 SOLUTION RESPIRATORY (INHALATION)
Status: DISCONTINUED | OUTPATIENT
Start: 2020-05-04 | End: 2020-05-06

## 2020-05-04 RX ORDER — SODIUM CHLORIDE 9 MG/ML
75 INJECTION, SOLUTION INTRAVENOUS CONTINUOUS
Status: DISCONTINUED | OUTPATIENT
Start: 2020-05-04 | End: 2020-05-06

## 2020-05-04 RX ORDER — MAGNESIUM SULFATE HEPTAHYDRATE 40 MG/ML
2 INJECTION, SOLUTION INTRAVENOUS ONCE
Status: COMPLETED | OUTPATIENT
Start: 2020-05-04 | End: 2020-05-04

## 2020-05-04 RX ORDER — INSULIN LISPRO 100 [IU]/ML
INJECTION, SOLUTION INTRAVENOUS; SUBCUTANEOUS EVERY 4 HOURS
Status: DISCONTINUED | OUTPATIENT
Start: 2020-05-04 | End: 2020-05-04

## 2020-05-04 RX ORDER — INSULIN GLARGINE 100 [IU]/ML
0.2 INJECTION, SOLUTION SUBCUTANEOUS
Status: DISCONTINUED | OUTPATIENT
Start: 2020-05-04 | End: 2020-05-07 | Stop reason: HOSPADM

## 2020-05-04 RX ORDER — INSULIN LISPRO 100 [IU]/ML
INJECTION, SOLUTION INTRAVENOUS; SUBCUTANEOUS
Status: DISCONTINUED | OUTPATIENT
Start: 2020-05-05 | End: 2020-05-06

## 2020-05-04 RX ORDER — BUSPIRONE HYDROCHLORIDE 7.5 MG/1
7.5 TABLET ORAL 2 TIMES DAILY
Status: DISCONTINUED | OUTPATIENT
Start: 2020-05-04 | End: 2020-05-07 | Stop reason: HOSPADM

## 2020-05-04 RX ORDER — ACETAMINOPHEN 325 MG/1
TABLET ORAL
Status: COMPLETED
Start: 2020-05-04 | End: 2020-05-04

## 2020-05-04 RX ORDER — PREDNISONE 1 MG/1
1 TABLET ORAL
Status: DISCONTINUED | OUTPATIENT
Start: 2020-05-05 | End: 2020-05-04

## 2020-05-04 RX ORDER — MAGNESIUM SULFATE 100 %
4 CRYSTALS MISCELLANEOUS AS NEEDED
Status: DISCONTINUED | OUTPATIENT
Start: 2020-05-04 | End: 2020-05-07 | Stop reason: HOSPADM

## 2020-05-04 RX ORDER — SODIUM CHLORIDE 0.9 % (FLUSH) 0.9 %
5-40 SYRINGE (ML) INJECTION AS NEEDED
Status: DISCONTINUED | OUTPATIENT
Start: 2020-05-04 | End: 2020-05-07 | Stop reason: HOSPADM

## 2020-05-04 RX ORDER — ACETAMINOPHEN 325 MG/1
650 TABLET ORAL
Status: DISCONTINUED | OUTPATIENT
Start: 2020-05-04 | End: 2020-05-07 | Stop reason: HOSPADM

## 2020-05-04 RX ADMIN — SODIUM CHLORIDE 1000 ML: 900 INJECTION, SOLUTION INTRAVENOUS at 13:38

## 2020-05-04 RX ADMIN — LEVOFLOXACIN 750 MG: 5 INJECTION, SOLUTION INTRAVENOUS at 21:17

## 2020-05-04 RX ADMIN — SODIUM CHLORIDE 150 ML/HR: 900 INJECTION, SOLUTION INTRAVENOUS at 21:18

## 2020-05-04 RX ADMIN — MAGNESIUM SULFATE HEPTAHYDRATE 2 G: 40 INJECTION, SOLUTION INTRAVENOUS at 16:40

## 2020-05-04 RX ADMIN — HEPARIN SODIUM 5000 UNITS: 5000 INJECTION INTRAVENOUS; SUBCUTANEOUS at 21:18

## 2020-05-04 RX ADMIN — BUDESONIDE 500 MCG: 0.5 INHALANT RESPIRATORY (INHALATION) at 21:07

## 2020-05-04 RX ADMIN — BUSPIRONE HYDROCHLORIDE 7.5 MG: 7.5 TABLET ORAL at 21:55

## 2020-05-04 RX ADMIN — INSULIN GLARGINE 10 UNITS: 100 INJECTION, SOLUTION SUBCUTANEOUS at 21:18

## 2020-05-04 RX ADMIN — AZITHROMYCIN MONOHYDRATE 500 MG: 500 INJECTION, POWDER, LYOPHILIZED, FOR SOLUTION INTRAVENOUS at 12:35

## 2020-05-04 RX ADMIN — SODIUM CHLORIDE 1000 ML: 900 INJECTION, SOLUTION INTRAVENOUS at 12:36

## 2020-05-04 RX ADMIN — ACETAMINOPHEN 650 MG: 325 TABLET, FILM COATED ORAL at 16:40

## 2020-05-04 RX ADMIN — ARFORMOTEROL TARTRATE 15 MCG: 15 SOLUTION RESPIRATORY (INHALATION) at 21:07

## 2020-05-04 RX ADMIN — Medication 10 ML: at 21:19

## 2020-05-04 RX ADMIN — SODIUM CHLORIDE 551 ML: 900 INJECTION, SOLUTION INTRAVENOUS at 14:08

## 2020-05-04 RX ADMIN — MELATONIN 4.5 MG: at 21:19

## 2020-05-04 RX ADMIN — IPRATROPIUM BROMIDE AND ALBUTEROL SULFATE 3 ML: .5; 3 SOLUTION RESPIRATORY (INHALATION) at 21:14

## 2020-05-04 NOTE — PROGRESS NOTES
Spoke with primary RN regarding sepsis bundle.   Signed By: Pillo Vaz RN, [facility]    May 4, 2020    Call back number [phone]

## 2020-05-04 NOTE — ED TRIAGE NOTES
Pt presents for 4 days of bilateral lung \"pain\" Tested negative for COVID. Seen at [facility] and dx w/ pneumonia/effusion. H/o COPD-no O2 at home. Pt (+)dyspnea and cough.  Tussionex at home

## 2020-05-04 NOTE — ED PROVIDER NOTES
[facility] EMERGENCY DEPT      11:13 AM    Date: 5/4/2020  Patient Name: Felicia Damon    History of Presenting Illness     Chief Complaint   Patient presents with    Shortness of Breath       78 y.o. female with noted past medical history who presents to the emergency department with shortness of breath and being sent here by [facility] urgent care. The patient states she was in her usual state of health until about 5 days ago she started getting a cough and shortness of breath. She was seen 3 days ago at [facility] urgent care center and told her that she had bronchitis. However she states that she did not get better with the cough medicine was only gotten worse. She denies having a fever above 100 °F.  Because the cough was worsening shortness of breath was worsened to the present, she was seen again today at [facility] urgent care. While therapies do a chest x-ray and they told that she had pneumonia had to come to the ER for evaluation treatment. According to the patient's that it was multilobar pneumonia. In addition, the patient states that last Friday, 3 days ago, the patient was tested for coronavirus that the test came back negative. Patient denies any other associated signs or symptoms. Patient denies any other complaints. Nursing notes regarding the HPI and triage nursing notes were reviewed. Prior medical records were reviewed.      Current Facility-Administered Medications   Medication Dose Route Frequency Provider Last Rate Last Dose    sodium chloride (NS) flush 5-10 mL  5-10 mL IntraVENous PRN Noble Perez MD        sodium chloride 0.9 % bolus infusion 1,000 mL  1,000 mL IntraVENous Ruben Curry MD        Followed by   [address] [place] [address] sodium chloride 0.9 % bolus infusion 551 mL  551 mL IntraVENous Ruben Curry MD         Current Outpatient Medications   Medication Sig Dispense Refill    predniSONE (DELTASONE) 1 mg tablet Take  by mouth daily (with breakfast).  abatacept (ORENCIA) 250 mg injection by IntraVENous route once.  methotrexate (RHEUMATREX) 2.5 mg tablet Take  by mouth. 5mg takes on fridays      folic acid (FOLVITE) 1 mg tablet Take  by mouth daily.  zoledronic acid (RECLAST) 5 mg/100 mL pgbk 5 mg by IntraVENous route once. Once per year      fluticasone propion-salmeteroL (ADVAIR DISKUS) 250-50 mcg/dose diskus inhaler Take 1 Puff by inhalation every twelve (12) hours.  montelukast (SINGULAIR) 10 mg tablet Take 10 mg by mouth daily.  albuterol (PROAIR HFA) 90 mcg/actuation inhaler Take  by inhalation.  loratadine (CLARITIN) 10 mg tablet Take 10 mg by mouth.  FLUoxetine (PROZAC) 20 mg capsule Take  by mouth daily.  omeprazole (PRILOSEC) 20 mg capsule Take 20 mg by mouth daily.  metFORMIN (GLUCOPHAGE) 500 mg tablet Take  by mouth two (2) times daily (with meals).  busPIRone (BUSPAR) 7.5 mg tablet Take 7.5 mg by mouth three (3) times daily.  melatonin 5 mg tablet Take  by mouth nightly. 10mg prn at night         Past History     Past Medical History:  Past Medical History:   Diagnosis Date    Asthma     Chronic obstructive pulmonary disease (HCC)     Diabetes ([facility] [address]     Heart murmur     Menopause        Past Surgical History:  History reviewed. No pertinent surgical history. Family History:  History reviewed. No pertinent family history. Social History:  Social History     Tobacco Use    Smoking status: Not on file   Substance Use Topics    Alcohol use: Not on file    Drug use: Not on file       Allergies: Allergies   Allergen Reactions    Sulfa (Sulfonamide Antibiotics) Diarrhea       Patient's primary care provider (as noted in EPIC):  Candie Pool NP    Review of Systems   Constitutional: Negative for diaphoresis. HENT: Negative for congestion. Eyes: Negative for discharge. Respiratory: Positive for cough and shortness of breath. Negative for wheezing and stridor. Cardiovascular: Negative for chest pain, palpitations and leg swelling. Gastrointestinal: Negative for diarrhea. Genitourinary: Negative for flank pain. Hematuria: .now. Musculoskeletal: Negative for back pain. Neurological: Negative for weakness. Psychiatric/Behavioral: Negative for hallucinations. All other systems reviewed and are negative. Visit Vitals  /60   Pulse (!) 108   Temp 97.7 °F (36.5 °C)   Resp 19   Wt 51.7 kg (114 lb)   SpO2 97%   BMI 20.85 kg/m²       PHYSICAL EXAM:    CONSTITUTIONAL:  Alert, in no apparent distress;  well developed;  well nourished. HEAD:  Normocephalic, atraumatic. EYES:  EOMI. Non-icteric sclera. Normal conjunctiva. ENTM:  Nose:  no rhinorrhea. Throat:  no erythema or exudate, mucous membranes moist.  NECK:  No JVD. Supple    RESPIRATORY:  Chest clear, equal breath sounds, good air movement. CARDIOVASCULAR: Tachycardic rate and regular rhythm. No murmurs, rubs, or gallops. GI:  Normal bowel sounds, abdomen soft and non-tender. No rebound or guarding. BACK:  Non-tender. UPPER EXT:  Normal inspection. LOWER EXT:  No edema, no calf tenderness. Distal pulses intact. NEURO:  Moves all four extremities, and grossly normal motor exam. Patient is awake and alert, and answers questions appropriatelly. SKIN:  No rashes;  Normal for age. PSYCH:  Alert and normal affect. DIFFERENTIAL DIAGNOSES/ MEDICAL DECISION MAKING:  Cough etiologies include viral upper respiratory infection, acute bronchitis, influenza/ flu, pneumonia, new onset asthma, congestive heart failure, other etiologies versus a combination of the above (ex. uri on top of pneumonia).     Diagnostic Study Results     Abnormal lab results from this emergency department encounter:  Labs Reviewed   CBC WITH AUTOMATED DIFF - Abnormal; Notable for the following components:       Result Value    WBC 18.7 (*)     RBC 3.68 (*)     HGB 10.4 (*)     HCT 32.1 (*)     RDW 14.8 (*) PLATELET 631 (*)     NEUTROPHILS 83 (*)     LYMPHOCYTES 8 (*)     ABS. NEUTROPHILS 15.5 (*)     ABS. MONOCYTES 1.6 (*)     All other components within normal limits   METABOLIC PANEL, BASIC - Abnormal; Notable for the following components:    Sodium 134 (*)     Chloride 99 (*)     Glucose 106 (*)     BUN 23 (*)     Creatinine 1.50 (*)     GFR est AA 42 (*)     GFR est non-AA 34 (*)     All other components within normal limits   MAGNESIUM - Abnormal; Notable for the following components:    Magnesium 1.2 (*)     All other components within normal limits   CULTURE, BLOOD   CULTURE, BLOOD   CARDIAC PANEL,(CK, CKMB & TROPONIN)   LACTIC ACID   LACTIC ACID   URINALYSIS W/ RFLX MICROSCOPIC   METABOLIC PANEL, COMPREHENSIVE       Lab values for this patient within approximately the last 12 hours:  Recent Results (from the past 12 hour(s))   CBC WITH AUTOMATED DIFF    Collection Time: 05/04/20 10:48 AM   Result Value Ref Range    WBC 18.7 (H) 4.6 - 13.2 K/uL    RBC 3.68 (L) 4.20 - 5.30 M/uL    HGB 10.4 (L) 12.0 - 16.0 g/dL    HCT 32.1 (L) 35.0 - 45.0 %    MCV 87.2 74.0 - 97.0 FL    MCH 28.3 24.0 - 34.0 PG    MCHC 32.4 31.0 - 37.0 g/dL    RDW 14.8 (H) 11.6 - 14.5 %    PLATELET 816 (H) 841 - 420 K/uL    MPV 9.4 9.2 - 11.8 FL    NEUTROPHILS 83 (H) 40 - 73 %    LYMPHOCYTES 8 (L) 21 - 52 %    MONOCYTES 9 3 - 10 %    EOSINOPHILS 0 0 - 5 %    BASOPHILS 0 0 - 2 %    ABS. NEUTROPHILS 15.5 (H) 1.8 - 8.0 K/UL    ABS. LYMPHOCYTES 1.5 0.9 - 3.6 K/UL    ABS. MONOCYTES 1.6 (H) 0.05 - 1.2 K/UL    ABS. EOSINOPHILS 0.0 0.0 - 0.4 K/UL    ABS.  BASOPHILS 0.0 0.0 - 0.1 K/UL    DF AUTOMATED     METABOLIC PANEL, BASIC    Collection Time: 05/04/20 10:48 AM   Result Value Ref Range    Sodium 134 (L) 136 - 145 mmol/L    Potassium 3.6 3.5 - 5.5 mmol/L    Chloride 99 (L) 100 - 111 mmol/L    CO2 22 21 - 32 mmol/L    Anion gap 13 3.0 - 18 mmol/L    Glucose 106 (H) 74 - 99 mg/dL    BUN 23 (H) 7.0 - 18 MG/DL    Creatinine 1.50 (H) 0.6 - 1.3 MG/DL BUN/Creatinine ratio 15 12 - 20      GFR est AA 42 (L) >60 ml/min/1.73m2    GFR est non-AA 34 (L) >60 ml/min/1.73m2    Calcium 9.0 8.5 - 10.1 MG/DL   MAGNESIUM    Collection Time: 05/04/20 10:48 AM   Result Value Ref Range    Magnesium 1.2 (L) 1.6 - 2.6 mg/dL   CARDIAC PANEL,(CK, CKMB & TROPONIN)    Collection Time: 05/04/20 10:48 AM   Result Value Ref Range    CK 45 26 - 192 U/L    CK - MB <1.0 <3.6 ng/ml    CK-MB Index  0.0 - 4.0 %     CALCULATION NOT PERFORMED WHEN RESULT IS BELOW LINEAR LIMIT    Troponin-I, QT <0.02 0.0 - 0.045 NG/ML   EKG, 12 LEAD, INITIAL    Collection Time: 05/04/20 12:04 PM   Result Value Ref Range    Ventricular Rate 109 BPM    Atrial Rate 109 BPM    P-R Interval 126 ms    QRS Duration 76 ms    Q-T Interval 336 ms    QTC Calculation (Bezet) 452 ms    Calculated R Axis 116 degrees    Calculated T Axis 110 degrees    Diagnosis       Sinus tachycardia  Left posterior fascicular block  Abnormal ECG  When compared with ECG of 15-FEB-2020 18:01,  premature atrial complexes are no longer present  Left posterior fascicular block is now present         Radiologist and cardiologist interpretations if available at time of this note:  Xr Chest Port    Result Date: 5/4/2020  EXAM: CHEST RADIOGRAPH, SINGLE VIEW CLINICAL INDICATION/HISTORY: chest pain, sob, and/or arrhythmia      <Additional:  Dyspnea and cough. COMPARISON: 2/15/2020 TECHNIQUE: Portable frontal view of the chest was obtained. _______________ FINDINGS: SUPPORT DEVICES: None. HEART AND MEDIASTINUM: Cardiac silhouette is within normal range in size. LUNGS AND PLEURAL SPACES: Pulmonary vessels are normal. Interval developing mild linear opacity is present across the left lung base just above the left hemidiaphragm. The right lung is clear. The costophrenic angles are sharp. No pneumothorax. BONY THORAX AND SOFT TISSUES: No acute osseous abnormality.  _______________     IMPRESSION: Mild left basilar atelectasis and/or streaky infiltrate. Emergency physician interpretation of EKG: Sinus tachycardia about 110 bpm.    Medication(s) ordered for patient during this emergency visit encounter:  Medications   sodium chloride (NS) flush 5-10 mL (has no administration in time range)   sodium chloride 0.9 % bolus infusion 1,000 mL (has no administration in time range)     Followed by   sodium chloride 0.9 % bolus infusion 551 mL (has no administration in time range)   sodium chloride 0.9 % bolus infusion 1,000 mL (1,000 mL IntraVENous New Bag 5/4/20 1236)   azithromycin (ZITHROMAX) 500 mg in 0.9% sodium chloride 250 mL IVPB (500 mg IntraVENous New Bag 5/4/20 1235)       Medical Decision Making     I am the first provider for this patient. I reviewed the vital signs, available nursing notes, past medical history, past surgical history, family history and social history. Vital Signs:  Reviewed the patient's vital signs. IMPRESSION AND MEDICAL DECISION MAKING:  Based upon the patient's presentation with noted HPI and PE, along with the work up done in the emergency department, I believe that the patient is having pneumonia. 1:36 PM  Patient has pneumonia with elevated white count tachycardia consistent with sepsis. Will admit. IV antibiotics started in the emergency department. Admit to Hospitalist    The patient was presented to the accepting hospitalist, Dr. Lenore Shabazz. The patient's primary doctor is Myah Wilson NP, and admissions for this physician are with the hospitalist.  If the patient has no primary doctor, then admission is to the hospitalist as well. As the emergency physician, I wrote courtesy admission orders for the hospitalist physician. The courtesy orders included explicit instructions for the floor nursing staff to call the admitting attending physician upon patient arrival on the floor.        Dictation disclaimer:  Please note that this dictation was completed with [employer], the [employer] voice recognition software. Quite often unanticipated grammatical, syntax, homophones, and other interpretive errors are inadvertently transcribed by the computer software. Please disregard these errors. Please excuse any errors that have escaped final proofreading. Coding Diagnoses     Clinical Impression:   1. Pneumonia of left lower lobe due to infectious organism ([facility] [address]    2. Sepsis, due to unspecified organism, unspecified whether acute organ dysfunction present [facility])        Disposition     Disposition:  Admit. PELON Vitale Board Certified Emergency Physician    Provider Attestation:  If a scribe was utilized in generation of this patient record, I personally performed the services described in the documentation, reviewed the documentation, as recorded by the scribe in my presence, and it accurately records the patient's history of presenting illness, review of systems, patient physical examination, and procedures performed by me as the attending physician. PELON Vitale Board Certified Emergency Physician  5/4/2020.  11:15 AM

## 2020-05-04 NOTE — H&P
Internal Medicine History and Physical  Note           NAME:  Sukhjinder Slade   :   1950   MRN:  [id number]     PCP:  Jennifer Esqueda NP     Date/Time:  2020 1:39 PM      I hereby certify this patient for admission based upon medical necessity as noted below:        Assessment / plan :        Principal Problem:    Sepsis ([facility] [address] (2/15/2020)    Active Problems:    Pneumonia (2020)      Chronic obstructive pulmonary disease with acute exacerbation ([facility] [address] (2020)      DM (diabetes mellitus) ([facility] [address] (2020)      Dehydration (2020)      Hypomagnesemia (2020)      Elevated WBC count (2020)       Viral infection still is a possibility, patient has dry cough, feels chills warm and shivering, immunosuppressant. Although she reported tested negative on Saturday for COVID I will repeat the test.    IV antibiotic. IVF hydration. Admitted to telemetry bed for further management. UrineTest for Ag for pneumonia   COVID-19 ruled out, droplet plus isolation. Pelvic test requested  Blood cultures lactic acid and pro calcitonin level    She is on low-dose po prednisone. Nebulizer regular and prn . Home regimen . Antibiotic course as ordered . CXR . PCCM as needed. Provide SSI, hypoglycemia protocol and frequent Accu checks. Provide SSI, hypoglycemia protocol and frequent Accu checks. Education,  diabetic educator  . Diabetic Diet      Monitor Renal function and other labs as indicated. Avoid nephrotoxins , iv Contrast, NSAID. Renally dosing medications. Monitor urine out put. Replete magnesium    -DVT prophylaxis :  heparin.   - Code Status : FULL    -Other chronic medical problems as per past history. Further management depend on the course of the case and expanded data base.   DISPO - pt to be admitted at this time for reasons addressed above, continued hospitalization for ongoing assessment and treatment indicated        Subjective:     CHIEF COMPLAINT: Lung pain and dry cough for 4 days    HISTORY OF PRESENT ILLNESS:     Ms. Benjaman Dakin is a 78 y.o.  female who is admitted for shortness of breath and pneumonia. Ms. Benjaman Dakin with past medical history as noted below , presented to the Emergency Department today  complaining of above. .Triage and ER notes noted. Patient seen by [facility] care [facility] on Friday and today. They did chest x-ray and they told her that she had pneumonia or bronchitis because she is very sick she referred to the ER. Patient reports she had a cover test on Saturday and told to be negative. She presented with high white BC  but it is chronic according to her chart anyway. Her respiratory rate and heart rate was high in the ER according to the ER MD and the chest x-ray reported with possible left lower lobe pneumonia with streaks  density. She report shortness of breath and cough for about 4 to 5 days, using inhalers and nebulizers twice a day at home without help. No fever they could document but she feels feverish and chills. She came in today because of persistent shortness of breath and cough. She lives with her . She was afebrile in the ER. She has history of rheumatoid arthritis and treated with immunosuppressant. Past Medical History:   Diagnosis Date    Asthma     Chronic obstructive pulmonary disease ([address]     Diabetes ([facility] [address]     Heart murmur     Menopause         History reviewed. No pertinent surgical history. Social History     Tobacco Use    Smoking status: Not on file   Substance Use Topics    Alcohol use: Not on file        History reviewed. No pertinent family history. Allergies   Allergen Reactions    Sulfa (Sulfonamide Antibiotics) Diarrhea        Prior to Admission medications    Medication Sig Start Date End Date Taking? Authorizing Provider   predniSONE (DELTASONE) 1 mg tablet Take  by mouth daily (with breakfast).     Other, MD Daria   abatacept (ORENCIA) 250 mg injection by IntraVENous route once. Daria Tolentino MD   methotrexate (RHEUMATREX) 2.5 mg tablet Take  by mouth. 5mg takes on fridays    Daria Tolentino MD   folic acid (FOLVITE) 1 mg tablet Take  by mouth daily. Daria Tolentino MD   zoledronic acid (RECLAST) 5 mg/100 mL pgbk 5 mg by IntraVENous route once. Once per year    Daria Tolentino MD   fluticasone propion-salmeteroL (ADVAIR DISKUS) 250-50 mcg/dose diskus inhaler Take 1 Puff by inhalation every twelve (12) hours. Daria Tolentino MD   montelukast (SINGULAIR) 10 mg tablet Take 10 mg by mouth daily. Daria Tolentino MD   albuterol (PROAIR HFA) 90 mcg/actuation inhaler Take  by inhalation. Daria Tolentino MD   loratadine (CLARITIN) 10 mg tablet Take 10 mg by mouth. Daria Tolentino MD   FLUoxetine (PROZAC) 20 mg capsule Take  by mouth daily. Daria Tolentino MD   omeprazole (PRILOSEC) 20 mg capsule Take 20 mg by mouth daily. Daria Tolentino MD   metFORMIN (GLUCOPHAGE) 500 mg tablet Take  by mouth two (2) times daily (with meals). Daria Tolentino MD   busPIRone (BUSPAR) 7.5 mg tablet Take 7.5 mg by mouth three (3) times daily. Daria Tolentino MD   melatonin 5 mg tablet Take  by mouth nightly.  10mg prn at night    Daria Tolentino MD       Review of Systems:  (bold if positive, otherwise negative), apart from what mentioned in HPI  Apart from above patient deny any other significant complain  Gen:  Weight gain, weight loss, fever, chills, fatigue  Eyes:  Visual changes, pain, conjunctivitis  ENT:  Sore throat, rhinorrhea, decreased hearing  CVS:  Palpitations, chest pain, dizziness, syncope, edema, PND  Pulm:  Cough, dyspnea, sputum, hemoptysis, wheezing  GI:  Abdominal pain, nausea, emesis, diarrhea, constipation, GERD, melena  :  Hematuria, incontinence, nocturia, dysuria, discharge  MS:  Pain, weakness, swelling, arthritis  Skin:  Rash, erythema, abscess, wound, moles  Endo:  Heat intolerance, cold intolerance, weight gain, polyuria  Hem:  Enlarged nodes, bruising, bleeding, night sweats  Renal:  Edema, change in urine, flank pain  Neuro:  Numbness, tingling, weakness, seizure, headache, tremors       VITALS:    Vital signs reviewed; most recent are:    Visit Vitals  /54   Pulse (!) 108   Temp 98.8 °F (37.1 °C)   Resp 24   Wt 51.7 kg (114 lb)   SpO2 97%   BMI 20.85 kg/m²     SpO2 Readings from Last 6 Encounters:   05/04/20 97%   02/17/20 91%        No intake or output data in the 24 hours ending 05/04/20 1506     Physical Exam:     Gen: Ill looking, ongoing dry cough appear stated age, Well-developed, in slight acute distress  HEENT:  Head atraumatic, normocephalic , hearing intact to voice, dry mucous membranes. Neck:  Trachea midline , No apparent JVD, Supple,   Resp: Bilateral rhonchi no accessory muscle use,Bilateral BS present  Card:   normal S1, S2 without Gallop . No Significant lower leg peripheral edema. Abd:  Soft, non-tender, non-distended, bowel sounds are present . Musc:  No cyanosis or clubbing. Skin:  No rashes or ulcers, skin turgor is good. Neuro:  Cranial nerves are grossly intact, no clear area of focal motor weakness, follows commands appropriately. Psych:  Good insight, oriented to person, place and time, alert. Labs: All Cardiac Markers in the last 24 hours:   Lab Results   Component Value Date/Time    CPK 45 05/04/2020 10:48 AM    CKMB <1.0 05/04/2020 10:48 AM    CKND1  05/04/2020 10:48 AM     CALCULATION NOT PERFORMED WHEN RESULT IS BELOW LINEAR LIMIT    TROIQ <0.02 05/04/2020 10:48 AM       Recent Labs     05/04/20  1048   WBC 18.7*   HGB 10.4*   HCT 32.1*   *     Recent Labs     05/04/20  1048   *   K 3.6   CL 99*   CO2 22   *   BUN 23*   CREA 1.50*   CA 9.0   MG 1.2*     Lab Results   Component Value Date/Time    Glucose (POC) 99 02/17/2020 07:39 AM    Glucose (POC) 163 (H) 02/16/2020 09:12 PM     No results for input(s): PH, PCO2, PO2, HCO3, FIO2 in the last 72 hours.   No results for input(s): INR, INREXT, INREXT in the last 72 hours. Xr Chest Port    Result Date: 5/4/2020  IMPRESSION: Mild left basilar atelectasis and/or streaky infiltrate. Please refer to radiology reports. Telemetry reviewed:   normal sinus rhythm    Risk of deterioration: high      Total time spent in the care of this patient: [address] discussed with: Patient, Nursing Staff, >50% of time spent in counseling and coordination of care and ER MD/Staff      Discussed:  Care Plan       I have personally reviewed all pertinent labs, films and EKGs that have officially resulted. I reviewed available pertaining electronic documentation outlining the initial presentation as well as the emergency room physician's encounter. This document in whole or part of it has been produced using voice recognition software. Unrecognized errors in transcription may be present.     Attending Physician: Diana Brar MD

## 2020-05-05 ENCOUNTER — APPOINTMENT (OUTPATIENT)
Dept: GENERAL RADIOLOGY | Age: 70
DRG: 872 | End: 2020-05-05
Attending: INTERNAL MEDICINE
Payer: MEDICARE

## 2020-05-05 LAB
ANION GAP SERPL CALC-SCNC: 9 MMOL/L (ref 3–18)
ATRIAL RATE: 109 BPM
BASOPHILS # BLD: 0 K/UL (ref 0–0.1)
BASOPHILS NFR BLD: 0 % (ref 0–2)
BUN SERPL-MCNC: 15 MG/DL (ref 7–18)
BUN/CREAT SERPL: 17 (ref 12–20)
CALCIUM SERPL-MCNC: 7.6 MG/DL (ref 8.5–10.1)
CALCULATED R AXIS, ECG10: 116 DEGREES
CALCULATED T AXIS, ECG11: 110 DEGREES
CHLORIDE SERPL-SCNC: 107 MMOL/L (ref 100–111)
CO2 SERPL-SCNC: 20 MMOL/L (ref 21–32)
CREAT SERPL-MCNC: 0.88 MG/DL (ref 0.6–1.3)
DIAGNOSIS, 93000: NORMAL
DIFFERENTIAL METHOD BLD: ABNORMAL
EOSINOPHIL # BLD: 0.1 K/UL (ref 0–0.4)
EOSINOPHIL NFR BLD: 0 % (ref 0–5)
ERYTHROCYTE [DISTWIDTH] IN BLOOD BY AUTOMATED COUNT: 14.6 % (ref 11.6–14.5)
GLUCOSE BLD STRIP.AUTO-MCNC: 100 MG/DL (ref 70–110)
GLUCOSE BLD STRIP.AUTO-MCNC: 102 MG/DL (ref 70–110)
GLUCOSE BLD STRIP.AUTO-MCNC: 104 MG/DL (ref 70–110)
GLUCOSE BLD STRIP.AUTO-MCNC: 97 MG/DL (ref 70–110)
GLUCOSE SERPL-MCNC: 103 MG/DL (ref 74–99)
HCT VFR BLD AUTO: 25.4 % (ref 35–45)
HGB BLD-MCNC: 8.1 G/DL (ref 12–16)
INR PPP: 1.3 (ref 0.8–1.2)
LYMPHOCYTES # BLD: 0.9 K/UL (ref 0.9–3.6)
LYMPHOCYTES NFR BLD: 5 % (ref 21–52)
MAGNESIUM SERPL-MCNC: 1.8 MG/DL (ref 1.6–2.6)
MCH RBC QN AUTO: 27.3 PG (ref 24–34)
MCHC RBC AUTO-ENTMCNC: 31.9 G/DL (ref 31–37)
MCV RBC AUTO: 85.5 FL (ref 74–97)
MONOCYTES # BLD: 2.1 K/UL (ref 0.05–1.2)
MONOCYTES NFR BLD: 13 % (ref 3–10)
NEUTS SEG # BLD: 12.8 K/UL (ref 1.8–8)
NEUTS SEG NFR BLD: 82 % (ref 40–73)
P-R INTERVAL, ECG05: 126 MS
PHOSPHATE SERPL-MCNC: 1.4 MG/DL (ref 2.5–4.9)
PLATELET # BLD AUTO: 629 K/UL (ref 135–420)
PMV BLD AUTO: 9 FL (ref 9.2–11.8)
POTASSIUM SERPL-SCNC: 3.5 MMOL/L (ref 3.5–5.5)
PROTHROMBIN TIME: 16.3 SEC (ref 11.5–15.2)
Q-T INTERVAL, ECG07: 336 MS
QRS DURATION, ECG06: 76 MS
QTC CALCULATION (BEZET), ECG08: 452 MS
RBC # BLD AUTO: 2.97 M/UL (ref 4.2–5.3)
SODIUM SERPL-SCNC: 136 MMOL/L (ref 136–145)
VENTRICULAR RATE, ECG03: 109 BPM
WBC # BLD AUTO: 15.9 K/UL (ref 4.6–13.2)

## 2020-05-05 PROCEDURE — 65660000000 HC RM CCU STEPDOWN

## 2020-05-05 PROCEDURE — 74011000250 HC RX REV CODE- 250: Performed by: INTERNAL MEDICINE

## 2020-05-05 PROCEDURE — 83735 ASSAY OF MAGNESIUM: CPT

## 2020-05-05 PROCEDURE — 94640 AIRWAY INHALATION TREATMENT: CPT

## 2020-05-05 PROCEDURE — 85025 COMPLETE CBC W/AUTO DIFF WBC: CPT

## 2020-05-05 PROCEDURE — 80048 BASIC METABOLIC PNL TOTAL CA: CPT

## 2020-05-05 PROCEDURE — 74011636637 HC RX REV CODE- 636/637: Performed by: INTERNAL MEDICINE

## 2020-05-05 PROCEDURE — 84100 ASSAY OF PHOSPHORUS: CPT

## 2020-05-05 PROCEDURE — 74011250636 HC RX REV CODE- 250/636: Performed by: INTERNAL MEDICINE

## 2020-05-05 PROCEDURE — 71045 X-RAY EXAM CHEST 1 VIEW: CPT

## 2020-05-05 PROCEDURE — 85610 PROTHROMBIN TIME: CPT

## 2020-05-05 PROCEDURE — 94761 N-INVAS EAR/PLS OXIMETRY MLT: CPT

## 2020-05-05 PROCEDURE — 82962 GLUCOSE BLOOD TEST: CPT

## 2020-05-05 PROCEDURE — 74011250637 HC RX REV CODE- 250/637: Performed by: INTERNAL MEDICINE

## 2020-05-05 RX ORDER — SODIUM,POTASSIUM PHOSPHATES 280-250MG
1 POWDER IN PACKET (EA) ORAL 4 TIMES DAILY
Status: DISPENSED | OUTPATIENT
Start: 2020-05-05 | End: 2020-05-07

## 2020-05-05 RX ADMIN — ARFORMOTEROL TARTRATE 15 MCG: 15 SOLUTION RESPIRATORY (INHALATION) at 20:29

## 2020-05-05 RX ADMIN — SODIUM CHLORIDE 150 ML/HR: 900 INJECTION, SOLUTION INTRAVENOUS at 10:59

## 2020-05-05 RX ADMIN — BUDESONIDE 500 MCG: 0.5 INHALANT RESPIRATORY (INHALATION) at 20:18

## 2020-05-05 RX ADMIN — MONTELUKAST 10 MG: 10 TABLET, FILM COATED ORAL at 09:53

## 2020-05-05 RX ADMIN — IPRATROPIUM BROMIDE AND ALBUTEROL SULFATE 3 ML: .5; 3 SOLUTION RESPIRATORY (INHALATION) at 20:18

## 2020-05-05 RX ADMIN — POTASSIUM & SODIUM PHOSPHATES POWDER PACK 280-160-250 MG 1 PACKET: 280-160-250 PACK at 18:08

## 2020-05-05 RX ADMIN — SODIUM CHLORIDE 75 ML/HR: 900 INJECTION, SOLUTION INTRAVENOUS at 21:58

## 2020-05-05 RX ADMIN — PANTOPRAZOLE SODIUM 40 MG: 40 TABLET, DELAYED RELEASE ORAL at 09:53

## 2020-05-05 RX ADMIN — ACETAMINOPHEN 650 MG: 325 TABLET, FILM COATED ORAL at 22:01

## 2020-05-05 RX ADMIN — INSULIN GLARGINE 10 UNITS: 100 INJECTION, SOLUTION SUBCUTANEOUS at 22:00

## 2020-05-05 RX ADMIN — SODIUM CHLORIDE 150 ML/HR: 900 INJECTION, SOLUTION INTRAVENOUS at 05:14

## 2020-05-05 RX ADMIN — ACETAMINOPHEN 650 MG: 325 TABLET, FILM COATED ORAL at 01:16

## 2020-05-05 RX ADMIN — POTASSIUM & SODIUM PHOSPHATES POWDER PACK 280-160-250 MG 1 PACKET: 280-160-250 PACK at 13:30

## 2020-05-05 RX ADMIN — BUDESONIDE 500 MCG: 0.5 INHALANT RESPIRATORY (INHALATION) at 08:16

## 2020-05-05 RX ADMIN — Medication 10 ML: at 05:21

## 2020-05-05 RX ADMIN — BUSPIRONE HYDROCHLORIDE 7.5 MG: 7.5 TABLET ORAL at 09:53

## 2020-05-05 RX ADMIN — HEPARIN SODIUM 5000 UNITS: 5000 INJECTION INTRAVENOUS; SUBCUTANEOUS at 05:13

## 2020-05-05 RX ADMIN — HEPARIN SODIUM 5000 UNITS: 5000 INJECTION INTRAVENOUS; SUBCUTANEOUS at 21:59

## 2020-05-05 RX ADMIN — FLUOXETINE 10 MG: 10 CAPSULE ORAL at 09:53

## 2020-05-05 RX ADMIN — IPRATROPIUM BROMIDE AND ALBUTEROL SULFATE 3 ML: .5; 3 SOLUTION RESPIRATORY (INHALATION) at 16:01

## 2020-05-05 RX ADMIN — Medication 10 ML: at 22:02

## 2020-05-05 RX ADMIN — BUSPIRONE HYDROCHLORIDE 7.5 MG: 7.5 TABLET ORAL at 18:08

## 2020-05-05 RX ADMIN — IPRATROPIUM BROMIDE AND ALBUTEROL SULFATE 3 ML: .5; 3 SOLUTION RESPIRATORY (INHALATION) at 08:16

## 2020-05-05 RX ADMIN — ACETAMINOPHEN 650 MG: 325 TABLET, FILM COATED ORAL at 11:40

## 2020-05-05 RX ADMIN — ACETAMINOPHEN 650 MG: 325 TABLET, FILM COATED ORAL at 16:21

## 2020-05-05 RX ADMIN — HEPARIN SODIUM 5000 UNITS: 5000 INJECTION INTRAVENOUS; SUBCUTANEOUS at 13:30

## 2020-05-05 RX ADMIN — ACETAMINOPHEN 650 MG: 325 TABLET, FILM COATED ORAL at 05:14

## 2020-05-05 RX ADMIN — ARFORMOTEROL TARTRATE 15 MCG: 15 SOLUTION RESPIRATORY (INHALATION) at 08:16

## 2020-05-05 RX ADMIN — POTASSIUM & SODIUM PHOSPHATES POWDER PACK 280-160-250 MG 1 PACKET: 280-160-250 PACK at 22:02

## 2020-05-05 RX ADMIN — MELATONIN 4.5 MG: at 22:01

## 2020-05-05 RX ADMIN — IPRATROPIUM BROMIDE AND ALBUTEROL SULFATE 3 ML: .5; 3 SOLUTION RESPIRATORY (INHALATION) at 11:47

## 2020-05-05 NOTE — ACP (ADVANCE CARE PLANNING)
Advance Care Planning     Advance Care Planning Clinical Specialist  Conversation Note      Date of ACP Conversation: 5/5/2020    Conversation Conducted with:   Patient with Decision Making Capacity    ACP Clinical Specialist: Maria Luisa Pacheco Decision Maker makes decisions on behalf of the incapacitated patient: Decision Maker is asked to consider and make decisions based on patient values, known preferences, or best interests. Health Care Decision Maker:    Current Designated Health Care Decision Maker:   (If there is a valid [place] named in the [address] [place] [address] Makers\" box in the ACP activity, but it is not visible above, be sure to open that field and then select the health care decision maker relationship (ie \"primary\") in the blank space to the right of the name.) Validate  this information as still accurate & up-to-date; edit [place] field as needed.)    Note: Assess and validate information in current ACP documents, as indicated. If no Decision Maker listed above or available through scanned documents, then:    If no Authorized Decision Maker has previously been identified, then patient chooses [place]:  \"Who would you like to name as your primary health care decision-maker? \"    Name: Sophie Fernández Relationship: Spouse  Phone number: [phone]  Epifanio Peguero this person be reached easily? \" YES  \"Who would you like to name as your back-up decision maker? \"   Name: Annette Callahan  Relationship: Son Phone number: [phone]  Epifanio Peguero this person be reached easily? \" YES    Note: If the relationship of these Decision-Makers to the patient does NOT follow your state's Next of Kin hierarchy, recommend that patient complete ACP document that meets state-specific requirements to allow them to act on the patient's behalf when appropriate. Care Preferences    Hospitalization:   \"If your health worsens and it becomes clear that your chance of recovery is unlikely, what would your preference be regarding hospitalization? \"    Choice:  []  The patient wants hospitalization  []  The patient prefers comfort-focused treatment without hospitalization. Ventilation: \"If you were in your present state of health and suddenly became very ill and were unable to breathe on your own, what would your preference be about the use of a ventilator (breathing machine) if it were available to you? \"      If patient would desire the use of a ventilator (breathing machine), answer \"yes\", if not \"no\":yes    \"If your health worsens and it becomes clear that your chance of recovery is unlikely, what would your preference be about the use of a ventilator (breathing machine) if it were available to you? \"     If patient would desire the use of a ventilator (breathing machine), answer \"yes\", if not \"no\"NO  States would not want to be on a vent long-term    Resuscitation  \"CPR works best to restart the heart when there is a sudden event, like a heart attack, in someone who is otherwise healthy. Unfortunately, CPR does not typically restart the heart for people who have serious health conditions or who are very sick. \"    \"In the event your heart stopped as a result of an underlying serious health condition, would you want attempts to be made to restart your heart (answer \"yes\" for attempt to resuscitate) or would you prefer a natural death (answer \"no\" for do not attempt to resuscitate)? \" no      NOTE: If the patient has a valid advance directive AND now provides care preference(s) that are inconsistent with that prior directive, advise the patient to consider either: creating a new advance directive that complies with state-specific requirements; or, if that is not possible, orally revoking that prior directive in accordance with state-specific requirements, which must be documented in the EHR.     [] Yes  [] No   Educated Patient / Decision Maker regarding differences between Advance Directives and portable DNR orders.     Length of ACP Conversation in minutes:      Conversation Outcomes:  [x] ACP discussion completed  [] Existing advance directive reviewed with patient; no changes to patient's previously recorded wishes   [] New Advance Directive completed   [] Portable Do Not Rescitate prepared for Provider review and signature  [] POLST/POST/MOLST/MOST prepared for Provider review and signature      Follow-up plan:    [x] Schedule follow-up conversation to continue planning (referred to  for AMD)  [] Referred individual to Provider for additional questions/concerns   [] Advised patient/agent/surrogate to review completed ACP document and update if needed with changes in condition, patient preferences or care setting     [x] This note routed to one or more involved healthcare providers

## 2020-05-05 NOTE — ED NOTES
TRANSFER - ED to INPATIENT REPORT:    Verbal report given to VLADIMIR Anthony(name) on Ron Dyer  being transferred to ICU(unit) for routine progression of care       Report consisted of patients Situation, Background, Assessment and   Recommendations(SBAR). SBAR report made available to receiving floor on this patient being transferred to  [phone] /2800)  for routine progression of care       Admitting diagnosis Sepsis ([facility] [A41.9]    Information from the following report(s) SBAR, ED Summary, Intake/Output and MAR was made available to receiving floor. Lines:   Peripheral IV 05/04/20 Left Antecubital (Active)   Site Assessment Clean, dry, & intact 5/4/2020 10:32 AM   Phlebitis Assessment 0 5/4/2020 10:32 AM   Infiltration Assessment 0 5/4/2020 10:32 AM   Dressing Status Clean, dry, & intact 5/4/2020 10:32 AM        Opportunity for questions and clarification was provided.       Patient is oriented to time, place, person and situation   Patient is  continent and ambulatory with assist     Valuables transported with patient     Patient transported with:   Registered Nurse    MAP (Monitor): 69 =Monitored (most recent)  Vitals w/ MEWS Score (last day)     Date/Time MEWS Score Pulse Resp Temp BP Level of Consciousness SpO2    05/04/20 1930    (!) 102  17    111/55    95 %    05/04/20 1900    99  17    117/53    95 %    05/04/20 1815    (!) 110  19    122/54    96 %    05/04/20 1800    (!) 111  23    120/48    96 %    05/04/20 1730    (!) 103  24    135/55    96 %    05/04/20 1700    (!) 110  25    138/59    96 %    05/04/20 1630    (!) 111  28  (!) 102.5 °F (39.2 °C)  145/73    97 %    05/04/20 1600    (!) 106  22    142/58    99 %    05/04/20 1530    (!) 107  25    131/78    99 %    05/04/20 1500    (!) 102  22    134/62    99 %    05/04/20 1430    (!) 107  18    127/65    97 %    05/04/20 1400  3  (!) 108  24  98.8 °F (37.1 °C)  129/54  Alert  97 %    05/04/20 1345    (!) 108  25    128/58    99 %    05/04/20 1330    (!) 108  20    123/64    98 %    05/04/20 1315    (!) 108  19    116/60    97 %    05/04/20 1300    (!) 107  20    119/56    98 %    05/04/20 1245    (!) 106  23    120/52    96 %    05/04/20 1230    (!) 107  19    136/50    97 %    05/04/20 1215    (!) 111  21    116/46    96 %    05/04/20 1200    (!) 107  15    116/42    96 %    05/04/20 1145    (!) 109  19    107/58    96 %    05/04/20 1130    (!) 115  25    119/62    96 %    05/04/20 1115    (!) 114  23    126/51    97 %    05/04/20 1100    (!) 112  22    112/50    96 %    05/04/20 1045    (!) 111  21    112/56    96 %    05/04/20 1030    (!) 112  27    (!) 78/55    99 %    05/04/20 1011  3  (!) 109  22  97.7 °F (36.5 °C)  131/80  Alert  99 %              Septic Patients:     Lactic Acid  No results found for: LACPOC (Most recent on top)      ALL LACTIC ACIDS GREATER THAN 2 MUST BE REPEATED POC WITHIN 4 HOURS OR PRIOR TO ADMISSION               Total Fluid Bolus initiated and documented on MAR: YES    All ordered antibiotics initiated within first 3 hours of TIME ZERO?   YES

## 2020-05-05 NOTE — PROGRESS NOTES
conducted an initial consultation and Spiritual Assessment for Lennox Ruffing, who is a 78 y.o.,female. Patients Primary Language is: [place]. According to the patients EMR Hinduism Affiliation is: [place]. The reason the Patient came to the hospital is:   Patient Active Problem List    Diagnosis Date Noted    Chronic obstructive pulmonary disease with acute exacerbation ([facility] 05/04/2020    DM (diabetes mellitus) ([facility] 05/04/2020    Dehydration 05/04/2020    Hypomagnesemia 05/04/2020    Elevated WBC count 05/04/2020    Pneumonia 02/16/2020    Immunosuppressed status ([facility] 02/15/2020    Sepsis ([facility] 02/15/2020        The  provided the following Interventions:   attempted to Initiate a relationship of care and support with patient in room 2702 this morning but found patient in isolation due to the possible covid 19 virus. . Patient is therefore not able to be seen or communicate with at present. Provided information about Spiritual Care Services. Offered prayer and assurance of continued prayers on patients behalf. Assessment:  Patient does not have any Episcopalian/cultural needs that will affect patients preferences in health care. There are no further spiritual or Episcopalian issues which require Spiritual Care Services interventions at this time. Plan:  Chaplains will continue to follow and will provide pastoral care on an as needed/requested basis    . William Sin   Spiritual Care   [phone]

## 2020-05-05 NOTE — PROGRESS NOTES
Internal Medicine Progress Note        NAME: Margret Linares   :  1950  MRM:  [id number]    Date/Time: 2020        ASSESSMENT/PLAN:    Patient feels better overall but still short of breath    #Sepsis. Viral infection still is a possibility, patient has dry cough, feels chills warm and shivering, immunosuppressant. Although she reported tested negative on Saturday for COVID I will repeat the test.   IV antibiotic. IVF hydration. Admitted to telemetry bed for further management. UrineTest for Ag for pneumonia negative. COVID-19 ruled out, droplet plus isolation. Pelvic test requested  Blood cultures negative to date  Normal lactic acid and pro calcitonin level  Elevated white blood cells, noted a chronic elevation as well. Antibiotic Levaquin  Still spiking temperature, will do blood culture if temperature at 101 or more    # COPD with acute exacerbation. She is on low-dose po prednisone. Nebulizer regular and prn . Home regimen . Antibiotic course as ordered . CXR . PCCM as needed.     #Diabetes mellitus. Provide SSI, hypoglycemia protocol and frequent Accu checks. Provide SSI, hypoglycemia protocol and frequent Accu checks. Education,  diabetic educator  . Diabetic Diet       #Acute kidney injury. Seems prerenal.  Dehydration. IV hydration and encourage p.o. intake. Monitor Renal function and other labs as indicated. Avoid nephrotoxins , iv Contrast, NSAID. Renally dosing medications.  Monitor urine out put.      # Hypomagnesemia r eplete magnesium    #Patient felt anxious and asking when to go home     -DVT prophylaxis :  heparin.   - Code Status : FULL    Lab Review:     Recent Labs     20  0240 20  1048   WBC 15.9* 18.7*   HGB 8.1* 10.4*   HCT 25.4* 32.1*   * 738*     Recent Labs     20  0240 20  1048    134*   K 3.5 3.6    99*   CO2 20* 22   * 106*   BUN 15 23*   CREA 0.88 1.50*   CA 7.6* 9.0   MG 1.8 1.2*   PHOS 1.4*  -- INR 1.3*  --      Lab Results   Component Value Date/Time    Glucose (POC) 102 05/05/2020 11:42 AM    Glucose (POC) 104 05/05/2020 09:47 AM    Glucose (POC) 114 (H) 05/04/2020 09:26 PM    Glucose (POC) 99 02/17/2020 07:39 AM    Glucose (POC) 163 (H) 02/16/2020 09:12 PM     No results for input(s): PH, PCO2, PO2, HCO3, FIO2 in the last 72 hours. Recent Labs     05/05/20  0240   INR 1.3*       No results found for: SDES  Lab Results   Component Value Date/Time    Culture result: NO GROWTH AFTER 16 HOURS 05/04/2020 11:00 AM    Culture result: NO GROWTH AFTER 16 HOURS 05/04/2020 10:45 AM    Culture result: NO GROWTH 6 DAYS 02/15/2020 05:55 PM       All Cardiac Markers in the last 24 hours: No results found for: CPK, CK, CKMMB, CKMB, RCK3, CKMBT, CKNDX, CKND1, MARTIN, TROPT, TROIQ, JO, TROPT, TNIPOC, BNP, BNPP        Intervals noted   Pt s/e @ bedside     Subjective:     Chief Complaint:      Still shortness of breath, going to the bathroom is a taxing process for her  But overall she feels better. ROS:  (bold if positive,otherwise negative)    Fever/chills ,  Dysuria   Cough , Sputum , SOB/PEDRO , Chest Pain     Diarrhea ,Nausea/Vomit , Abd Pain , Constipation     Tolerating Diet                Objective:     Vitals:  Last 24hrs VS reviewed since prior progress note. Most recent are:    Visit Vitals  /58 (BP 1 Location: Left arm, BP Patient Position: At rest)   Pulse (!) 110   Temp 97 °F (36.1 °C)   Resp 22   Wt 51.7 kg (114 lb)   SpO2 96%   BMI 20.85 kg/m²     SpO2 Readings from Last 6 Encounters:   05/05/20 96%   02/17/20 91%            Intake/Output Summary (Last 24 hours) at 5/5/2020 1401  Last data filed at 5/5/2020 0519  Gross per 24 hour   Intake 590 ml   Output 1150 ml   Net -560 ml          Physical Exam:   Gen: Less Ill looking,  appear stated age, Well-developed, in slight acute distress  HEENT:  Head atraumatic, normocephalic , hearing intact to voice, moist mucous membranes.   Neck:   Trachea midline , No apparent JVD, Supple,   Resp: Bilateral rhonchi improved, no accessory muscle use,Bilateral BS present  Card:   normal S1, S2 without Gallop . No Significant lower leg peripheral edema. Abd:  Soft, non-tender, non-distended, bowel sounds are present . Musc:  No cyanosis or clubbing. Skin:  No rashes or ulcers, skin turgor is good. Neuro:  Cranial nerves are grossly intact, no clear area of focal motor weakness, follows commands appropriately. Psych:  Good insight, oriented to person, place and time, alert.      Telemetry reviewed:   normal sinus rhythm    Medications Reviewed: (see below)    Lab Data Reviewed: (see below)    ______________________________________________________________________    Medications:     Current Facility-Administered Medications   Medication Dose Route Frequency    potassium, sodium phosphates (NEUTRA-PHOS) packet 1 Packet  1 Packet Oral QID    sodium chloride (NS) flush 5-10 mL  5-10 mL IntraVENous PRN    busPIRone (BUSPAR) tablet 7.5 mg  7.5 mg Oral BID    FLUoxetine (PROzac) capsule 10 mg  10 mg Oral DAILY    budesonide (PULMICORT) 500 mcg/2 ml nebulizer suspension  500 mcg Nebulization BID RT    arformoteroL (BROVANA) neb solution 15 mcg  15 mcg Nebulization BID RT    melatonin tablet 4.5 mg  4.5 mg Oral QHS    [START ON 5/8/2020] methotrexate (RHEUMATREX) tablet 5 mg  5 mg Oral every Friday    montelukast (SINGULAIR) tablet 10 mg  10 mg Oral DAILY    pantoprazole (PROTONIX) tablet 40 mg  40 mg Oral ACB    sodium chloride (NS) flush 5-40 mL  5-40 mL IntraVENous Q8H    sodium chloride (NS) flush 5-40 mL  5-40 mL IntraVENous PRN    levoFLOXacin (LEVAQUIN) 750 mg in D5W IVPB  750 mg IntraVENous Q48H    acetaminophen (TYLENOL) tablet 650 mg  650 mg Oral Q4H PRN    heparin (porcine) injection 5,000 Units  5,000 Units SubCUTAneous Q8H    albuterol-ipratropium (DUO-NEB) 2.5 MG-0.5 MG/3 ML  3 mL Nebulization QID RT    0.9% sodium chloride infusion  75 mL/hr IntraVENous CONTINUOUS    insulin glargine (LANTUS) injection 10 Units  0.2 Units/kg SubCUTAneous QHS    glucose chewable tablet 16 g  4 Tab Oral PRN    glucagon (GLUCAGEN) injection 1 mg  1 mg IntraMUSCular PRN    dextrose 10 % infusion 125-250 mL  125-250 mL IntraVENous PRN    insulin lispro (HUMALOG) injection   SubCUTAneous AC&HS          Total time spent with patient: 35 minutes                  Care Plan discussed with: Patient, Nursing Staff and >50% of time spent in counseling and coordination of care    Discussed:  Care Plan    Prophylaxis:  Hep SQ    Disposition:  Home w/Family           This document in whole or part of it has been produced using voice recognition software. Unrecognized errors in transcription may be present.     Attending Physician: Joycelyn Dacosta MD

## 2020-05-05 NOTE — PROGRESS NOTES
Respiratory Therapy Assessment Care Plan    Patient: Margret Linares 78 y.o. female 5/5/2020 8:44 AM    Sepsis [facility]) [A41.9]      Chest X-RAY:   Results from Hospital Encounter encounter on 05/04/20   XR CHEST PORT    Impression IMPRESSION:    Mild left basilar atelectasis and/or streaky infiltrate. Results from [place] encounter on 02/15/20   XR CHEST SNGL V    Impression IMPRESSION:    No active cardiopulmonary disease. Results from [place] encounter on 10/24/18   XR CHEST PA LAT    Impression IMPRESSION:  1. No radiographic evidence of acute cardiopulmonary process. 2. Evidence of perihilar cuffing which is characteristic of reactive airway  disease, bronchitis, or viral illness. NOTE:  I have personally reviewed the images and the final attending  interpretation is in concordance with the above resident report.             Vital Signs:   Visit Vitals  /58   Pulse 89   Temp 98.9 °F (37.2 °C)   Resp 14   Wt 51.7 kg (114 lb)   SpO2 96%   BMI 20.85 kg/m²         Indications for treatment: Hx of  COPD      Plan of care: Duoneb QID, Pulmicort, Brovana BID        Goal: Maintain adequate gas exchange

## 2020-05-05 NOTE — PROGRESS NOTES
Problem: General Medical Care Plan  Goal: *Vital signs within specified parameters  Outcome: Progressing Towards Goal  Goal: *Labs within defined limits  Outcome: Progressing Towards Goal  Goal: *Absence of infection signs and symptoms  Outcome: Progressing Towards Goal  Goal: *Optimal pain control at patient's stated goal  Outcome: Progressing Towards Goal  Goal: *Skin integrity maintained  Outcome: Progressing Towards Goal  Goal: *Fluid volume balance  Outcome: Progressing Towards Goal  Goal: *Optimize nutritional status  Outcome: Progressing Towards Goal  Goal: *Anxiety reduced or absent  Outcome: Progressing Towards Goal  Goal: *Progressive mobility and function (eg: ADL's)  Outcome: Progressing Towards Goal     Problem: Pain  Goal: *Control of Pain  Outcome: Progressing Towards Goal  Goal: *PALLIATIVE CARE:  Alleviation of Pain  Outcome: Progressing Towards Goal     Problem: Falls - Risk of  Goal: *Absence of Falls  Description: Document Brittney Fall Risk and appropriate interventions in the flowsheet.   Outcome: Progressing Towards Goal  Note: Fall Risk Interventions:                                Problem: Chronic Obstructive Pulmonary Disease (COPD)  Goal: *Oxygen saturation during activity within specified parameters  Outcome: Progressing Towards Goal  Goal: *Able to remain out of bed as prescribed  Outcome: Progressing Towards Goal  Goal: *Absence of hypoxia  Outcome: Progressing Towards Goal  Goal: *Optimize nutritional status  Outcome: Progressing Towards Goal     Problem: Patient Education: Go to Patient Education Activity  Goal: Patient/Family Education  Outcome: Progressing Towards Goal     Problem: Gas Exchange - Impaired  Goal: *Absence of hypoxia  Outcome: Progressing Towards Goal     Problem: Patient Education: Go to Patient Education Activity  Goal: Patient/Family Education  Outcome: Progressing Towards Goal

## 2020-05-05 NOTE — PROGRESS NOTES
Problem: Discharge Planning  Goal: *Discharge to safe environment  Outcome: Progressing Towards Goal     Plan  Home    Reason for Admission:                     RUR Score:   17               PCP First and Last name:  Tavia Clarke   Name of Practice:    Are you a current patient: Yes/No:  Yes   Approximate date of last visit:  About 1 month ago    Do you (patient/family) have any concerns for transition/discharge? Not at this time              Plan for utilizing home health: Will assess for needs    Current Advanced Directive/Advance Care Plan:  Per spouse, they have advanced directive, states he's waiting to have it witnessed & declines to bring in @ this time. Transition of Care Plan:    Home with out-pt follow up. Chart reviewed. Attempted to call pt in room, not able. .  Called pt's spouse, Johana Alcantara, verified all demographics. States pt has MCR/AARP ins. Lives with her spouse & he will pick her up @ discharge. Has nebulizer, no other DME. Independent with ADL's prior to admit. Will cont to follow for any needs. Carmela RubinRN,ext [phone]. Patient unable to designate anyone to participate in his/her discharge plan and to receive any needed information, secondary to covid isolation & no phone in room. NOK: spouse     Name: Johana Alcantara  Address:  Phone number:  [phone]    Care Management Interventions  PCP Verified by CM: Yes(Carol Sinclair NP, last seen about i month ago)  Palliative Care Criteria Met (RRAT>21 & CHF Dx)?: No  Mode of Transport at Discharge:  Other (see comment)(family)  Transition of Care Consult (CM Consult): Discharge Planning  Discharge Durable Medical Equipment: No  Physical Therapy Consult: No  Occupational Therapy Consult: No  Speech Therapy Consult: No  Current Support Network: Lives with Spouse  Confirm Follow Up Transport: Family  Discharge Location  Discharge Placement: Home

## 2020-05-06 LAB
ANION GAP SERPL CALC-SCNC: 6 MMOL/L (ref 3–18)
BASOPHILS # BLD: 0 K/UL (ref 0–0.1)
BASOPHILS NFR BLD: 0 % (ref 0–2)
BUN SERPL-MCNC: 6 MG/DL (ref 7–18)
BUN/CREAT SERPL: 8 (ref 12–20)
CALCIUM SERPL-MCNC: 7.6 MG/DL (ref 8.5–10.1)
CHLORIDE SERPL-SCNC: 110 MMOL/L (ref 100–111)
CO2 SERPL-SCNC: 23 MMOL/L (ref 21–32)
CREAT SERPL-MCNC: 0.71 MG/DL (ref 0.6–1.3)
DIFFERENTIAL METHOD BLD: ABNORMAL
EOSINOPHIL # BLD: 0.2 K/UL (ref 0–0.4)
EOSINOPHIL NFR BLD: 1 % (ref 0–5)
ERYTHROCYTE [DISTWIDTH] IN BLOOD BY AUTOMATED COUNT: 14.9 % (ref 11.6–14.5)
GLUCOSE BLD STRIP.AUTO-MCNC: 81 MG/DL (ref 70–110)
GLUCOSE BLD STRIP.AUTO-MCNC: 84 MG/DL (ref 70–110)
GLUCOSE BLD STRIP.AUTO-MCNC: 90 MG/DL (ref 70–110)
GLUCOSE BLD STRIP.AUTO-MCNC: 91 MG/DL (ref 70–110)
GLUCOSE SERPL-MCNC: 74 MG/DL (ref 74–99)
HCT VFR BLD AUTO: 28.6 % (ref 35–45)
HGB BLD-MCNC: 9.1 G/DL (ref 12–16)
LYMPHOCYTES # BLD: 1.5 K/UL (ref 0.9–3.6)
LYMPHOCYTES NFR BLD: 9 % (ref 21–52)
MAGNESIUM SERPL-MCNC: 1.6 MG/DL (ref 1.6–2.6)
MCH RBC QN AUTO: 27.5 PG (ref 24–34)
MCHC RBC AUTO-ENTMCNC: 31.8 G/DL (ref 31–37)
MCV RBC AUTO: 86.4 FL (ref 74–97)
MONOCYTES # BLD: 2.1 K/UL (ref 0.05–1.2)
MONOCYTES NFR BLD: 13 % (ref 3–10)
NEUTS SEG # BLD: 12.5 K/UL (ref 1.8–8)
NEUTS SEG NFR BLD: 77 % (ref 40–73)
PHOSPHATE SERPL-MCNC: 2.4 MG/DL (ref 2.5–4.9)
PLATELET # BLD AUTO: 768 K/UL (ref 135–420)
PMV BLD AUTO: 9 FL (ref 9.2–11.8)
POTASSIUM SERPL-SCNC: 3.7 MMOL/L (ref 3.5–5.5)
RBC # BLD AUTO: 3.31 M/UL (ref 4.2–5.3)
SARS-COV-2, COV2NT: NOT DETECTED
SODIUM SERPL-SCNC: 139 MMOL/L (ref 136–145)
WBC # BLD AUTO: 16.4 K/UL (ref 4.6–13.2)

## 2020-05-06 PROCEDURE — 65660000000 HC RM CCU STEPDOWN

## 2020-05-06 PROCEDURE — 74011250637 HC RX REV CODE- 250/637: Performed by: INTERNAL MEDICINE

## 2020-05-06 PROCEDURE — 74011000250 HC RX REV CODE- 250: Performed by: INTERNAL MEDICINE

## 2020-05-06 PROCEDURE — 85025 COMPLETE CBC W/AUTO DIFF WBC: CPT

## 2020-05-06 PROCEDURE — 94761 N-INVAS EAR/PLS OXIMETRY MLT: CPT

## 2020-05-06 PROCEDURE — 80048 BASIC METABOLIC PNL TOTAL CA: CPT

## 2020-05-06 PROCEDURE — 83735 ASSAY OF MAGNESIUM: CPT

## 2020-05-06 PROCEDURE — 82962 GLUCOSE BLOOD TEST: CPT

## 2020-05-06 PROCEDURE — 74011636637 HC RX REV CODE- 636/637: Performed by: INTERNAL MEDICINE

## 2020-05-06 PROCEDURE — 74011250636 HC RX REV CODE- 250/636: Performed by: INTERNAL MEDICINE

## 2020-05-06 PROCEDURE — 94640 AIRWAY INHALATION TREATMENT: CPT

## 2020-05-06 PROCEDURE — 84100 ASSAY OF PHOSPHORUS: CPT

## 2020-05-06 RX ORDER — IPRATROPIUM BROMIDE AND ALBUTEROL SULFATE 2.5; .5 MG/3ML; MG/3ML
3 SOLUTION RESPIRATORY (INHALATION) 3 TIMES DAILY
Status: DISCONTINUED | OUTPATIENT
Start: 2020-05-06 | End: 2020-05-07 | Stop reason: HOSPADM

## 2020-05-06 RX ORDER — IPRATROPIUM BROMIDE AND ALBUTEROL SULFATE 2.5; .5 MG/3ML; MG/3ML
3 SOLUTION RESPIRATORY (INHALATION) 3 TIMES DAILY
Status: DISCONTINUED | OUTPATIENT
Start: 2020-05-06 | End: 2020-05-06

## 2020-05-06 RX ORDER — INSULIN LISPRO 100 [IU]/ML
INJECTION, SOLUTION INTRAVENOUS; SUBCUTANEOUS
Status: DISCONTINUED | OUTPATIENT
Start: 2020-05-06 | End: 2020-05-07 | Stop reason: HOSPADM

## 2020-05-06 RX ORDER — BUTALBITAL, ACETAMINOPHEN AND CAFFEINE 300; 40; 50 MG/1; MG/1; MG/1
2 CAPSULE ORAL ONCE
Status: COMPLETED | OUTPATIENT
Start: 2020-05-06 | End: 2020-05-06

## 2020-05-06 RX ADMIN — Medication 10 ML: at 21:42

## 2020-05-06 RX ADMIN — HEPARIN SODIUM 5000 UNITS: 5000 INJECTION INTRAVENOUS; SUBCUTANEOUS at 21:40

## 2020-05-06 RX ADMIN — PANTOPRAZOLE SODIUM 40 MG: 40 TABLET, DELAYED RELEASE ORAL at 09:08

## 2020-05-06 RX ADMIN — ARFORMOTEROL TARTRATE 15 MCG: 15 SOLUTION RESPIRATORY (INHALATION) at 20:34

## 2020-05-06 RX ADMIN — HEPARIN SODIUM 5000 UNITS: 5000 INJECTION INTRAVENOUS; SUBCUTANEOUS at 12:37

## 2020-05-06 RX ADMIN — HEPARIN SODIUM 5000 UNITS: 5000 INJECTION INTRAVENOUS; SUBCUTANEOUS at 05:39

## 2020-05-06 RX ADMIN — BUDESONIDE 500 MCG: 0.5 INHALANT RESPIRATORY (INHALATION) at 07:48

## 2020-05-06 RX ADMIN — IPRATROPIUM BROMIDE AND ALBUTEROL SULFATE 3 ML: .5; 3 SOLUTION RESPIRATORY (INHALATION) at 12:18

## 2020-05-06 RX ADMIN — BUSPIRONE HYDROCHLORIDE 7.5 MG: 7.5 TABLET ORAL at 09:08

## 2020-05-06 RX ADMIN — ARFORMOTEROL TARTRATE 15 MCG: 15 SOLUTION RESPIRATORY (INHALATION) at 07:48

## 2020-05-06 RX ADMIN — ACETAMINOPHEN 650 MG: 325 TABLET, FILM COATED ORAL at 05:39

## 2020-05-06 RX ADMIN — Medication 10 ML: at 05:39

## 2020-05-06 RX ADMIN — INSULIN GLARGINE 10 UNITS: 100 INJECTION, SOLUTION SUBCUTANEOUS at 21:41

## 2020-05-06 RX ADMIN — ACETAMINOPHEN 650 MG: 325 TABLET, FILM COATED ORAL at 00:40

## 2020-05-06 RX ADMIN — BUDESONIDE 500 MCG: 0.5 INHALANT RESPIRATORY (INHALATION) at 20:34

## 2020-05-06 RX ADMIN — IPRATROPIUM BROMIDE AND ALBUTEROL SULFATE 3 ML: .5; 3 SOLUTION RESPIRATORY (INHALATION) at 07:48

## 2020-05-06 RX ADMIN — BUSPIRONE HYDROCHLORIDE 7.5 MG: 7.5 TABLET ORAL at 18:10

## 2020-05-06 RX ADMIN — BUTALBITAL, ACETAMINOPHEN, AND CAFFEINE CAPSULES 2 CAPSULE: 50; 300; 40 CAPSULE ORAL at 16:14

## 2020-05-06 RX ADMIN — IPRATROPIUM BROMIDE AND ALBUTEROL SULFATE 3 ML: .5; 3 SOLUTION RESPIRATORY (INHALATION) at 20:34

## 2020-05-06 RX ADMIN — MELATONIN 4.5 MG: at 21:42

## 2020-05-06 RX ADMIN — LEVOFLOXACIN 750 MG: 5 INJECTION, SOLUTION INTRAVENOUS at 21:40

## 2020-05-06 RX ADMIN — POTASSIUM & SODIUM PHOSPHATES POWDER PACK 280-160-250 MG 1 PACKET: 280-160-250 PACK at 09:08

## 2020-05-06 RX ADMIN — Medication 10 ML: at 15:27

## 2020-05-06 RX ADMIN — POTASSIUM & SODIUM PHOSPHATES POWDER PACK 280-160-250 MG 1 PACKET: 280-160-250 PACK at 12:37

## 2020-05-06 RX ADMIN — FLUOXETINE 10 MG: 10 CAPSULE ORAL at 09:08

## 2020-05-06 RX ADMIN — ACETAMINOPHEN 650 MG: 325 TABLET, FILM COATED ORAL at 13:51

## 2020-05-06 RX ADMIN — MONTELUKAST 10 MG: 10 TABLET, FILM COATED ORAL at 09:08

## 2020-05-06 NOTE — PROGRESS NOTES
Respiratory Therapy Assessment Care Plan    Patient: Tami Guevara 78 y.o. female 5/6/2020 8:14 AM    Sepsis [facility]) [A41.9]      Chest X-RAY:   Results from Hospital Encounter encounter on 05/04/20   XR CHEST PORT    Impression IMPRESSION:    Coarsened interstitial markings. XR CHEST PORT    Impression IMPRESSION:    Mild left basilar atelectasis and/or streaky infiltrate. Results from [facility] encounter on 02/15/20   XR CHEST SNGL V    Impression IMPRESSION:    No active cardiopulmonary disease.           Vital Signs:   Visit Vitals  /58 (BP 1 Location: Left arm, BP Patient Position: At rest)   Pulse 95   Temp 99 °F (37.2 °C)   Resp 19   Wt 56.8 kg (125 lb 4.8 oz)   SpO2 96%   BMI 22.92 kg/m²         Indications for treatment: Hx of  COPD        Plan of care: Duoneb QID, Pulmicort, Brovana BID           Goal: Maintain adequate gas exchange

## 2020-05-06 NOTE — PROGRESS NOTES
If pt needs home O2 need chronic dx, walk test documentation, to qualify, with any acute dx's documented as resolved.

## 2020-05-06 NOTE — PROGRESS NOTES
Internal Medicine Progress Note        NAME: Khris Licona   :  1950  MRM:  [id number]    Date/Time: 2020        ASSESSMENT/PLAN:    Patient continued to improve. Will discharge home soon once COVID test deemed negative    # Sepsis. Likely due to respiratory infection or COPD exacerbation. Viral infection still is a possibility, patient presented with dry cough, feels chills warm and shivering, and she is immunosuppressant. Although she reported tested negative on Saturday for COVID, test repeated . Treated with IV antibiotic. IVF hydration. Admitted to telemetry bed for further management. UrineTest for Ag for pneumonia negative. COVID-19 ruled out, droplet plus isolation. Blood cultures negative to date  Normal lactic acid and pro calcitonin level  Elevated white blood cells, noted a chronic elevation as well. Antibiotic Levaquin  No spiking of temperature over the last 24 hours. # COPD with acute exacerbation. Clinically much improved. Reduce her nebulizer treatment frequency. She is on low-dose po prednisone at home. Nebulizer regular and prn . Home regimen . Antibiotic course as ordered . CXR chest x-ray with no report of pneumonia. .  She is on room air now. Needs 6-minute walk check for oxygen need prior to discharge. #Diabetes mellitus. Provide SSI, hypoglycemia protocol and frequent Accu checks. Provide SSI, hypoglycemia protocol and frequent Accu checks. Diabetic Diet   Blood cultures good control, will reduce the frequency of Accu-Chek     # Acute kidney injury. Seems prerenal.  Dehydration. Improved with IV hydration and encourage p.o. intake. Monitor Renal function and other labs as indicated. Avoid nephrotoxins , iv Contrast, NSAID. Renally dosing medications.  Monitor urine out put.      # Hypomagnesemia r eplete magnesium        -DVT prophylaxis :  heparin.   - Code Status : FULL    Lab Review:     Recent Labs     20  0545 20  4612 05/04/20  1048   WBC 16.4* 15.9* 18.7*   HGB 9.1* 8.1* 10.4*   HCT 28.6* 25.4* 32.1*   * 629* 738*     Recent Labs     05/06/20  0545 05/05/20  0240 05/04/20  1048    136 134*   K 3.7 3.5 3.6    107 99*   CO2 23 20* 22   GLU 74 103* 106*   BUN 6* 15 23*   CREA 0.71 0.88 1.50*   CA 7.6* 7.6* 9.0   MG 1.6 1.8 1.2*   PHOS 2.4* 1.4*  --    INR  --  1.3*  --      Lab Results   Component Value Date/Time    Glucose (POC) 90 05/06/2020 11:39 AM    Glucose (POC) 81 05/06/2020 09:06 AM    Glucose (POC) 100 05/05/2020 09:55 PM    Glucose (POC) 97 05/05/2020 06:07 PM    Glucose (POC) 102 05/05/2020 11:42 AM     No results for input(s): PH, PCO2, PO2, HCO3, FIO2 in the last 72 hours. Recent Labs     05/05/20  0240   INR 1.3*       No results found for: SDES  Lab Results   Component Value Date/Time    Culture result: NO GROWTH 2 DAYS 05/04/2020 11:00 AM    Culture result: NO GROWTH 2 DAYS 05/04/2020 10:45 AM    Culture result: NO GROWTH 6 DAYS 02/15/2020 05:55 PM       All Cardiac Markers in the last 24 hours: No results found for: CPK, CK, CKMMB, CKMB, RCK3, CKMBT, CKNDX, CKND1, MARTIN, TROPT, TROIQ, JO, TROPT, TNIPOC, BNP, BNPP        Intervals noted   Pt s/e @ bedside     Subjective:     Chief Complaint:      Report she improved so much better, about 80% compared to when she came in  Still dyspnea on exertion. ROS:  (bold if positive,otherwise negative)    Fever/chills ,  Dysuria   Cough , Sputum , SOB/PEDRO , Chest Pain     Diarrhea ,Nausea/Vomit , Abd Pain , Constipation     Tolerating Diet                Objective:     Vitals:  Last 24hrs VS reviewed since prior progress note.  Most recent are:    Visit Vitals  /57   Pulse 93   Temp 97.2 °F (36.2 °C)   Resp 19   Wt 56.8 kg (125 lb 4.8 oz)   SpO2 99%   BMI 22.92 kg/m²     SpO2 Readings from Last 6 Encounters:   05/06/20 99%   02/17/20 91%            Intake/Output Summary (Last 24 hours) at 5/6/2020 1225  Last data filed at 5/6/2020 0700  Gross per 24 hour   Intake 0 ml   Output 1400 ml   Net -1400 ml          Physical Exam:   Gen: Less Ill looking,  appear stated age, Well-developed, in slight acute distress  HEENT:  Head atraumatic, normocephalic , hearing intact to voice, moist mucous membranes. Neck:   Trachea midline , No apparent JVD, Supple,   Resp: Bilateral rhonchi much improved, no accessory muscle use,Bilateral BS present  Card:   normal S1, S2 without Gallop . No Significant lower leg peripheral edema. Abd:  Soft, non-tender, non-distended, bowel sounds are present . Musc:  No cyanosis or clubbing. Skin:  No rashes or ulcers, skin turgor is good. Neuro:  Cranial nerves are grossly intact, no clear area of focal motor weakness, follows commands appropriately. Psych:  Good insight, oriented to person, place and time, alert.      Telemetry reviewed:   normal sinus rhythm    Medications Reviewed: (see below)    Lab Data Reviewed: (see below)    ______________________________________________________________________    Medications:     Current Facility-Administered Medications   Medication Dose Route Frequency    albuterol-ipratropium (DUO-NEB) 2.5 MG-0.5 MG/3 ML  3 mL Nebulization TID    potassium, sodium phosphates (NEUTRA-PHOS) packet 1 Packet  1 Packet Oral QID    sodium chloride (NS) flush 5-10 mL  5-10 mL IntraVENous PRN    busPIRone (BUSPAR) tablet 7.5 mg  7.5 mg Oral BID    FLUoxetine (PROzac) capsule 10 mg  10 mg Oral DAILY    budesonide (PULMICORT) 500 mcg/2 ml nebulizer suspension  500 mcg Nebulization BID RT    arformoteroL (BROVANA) neb solution 15 mcg  15 mcg Nebulization BID RT    melatonin tablet 4.5 mg  4.5 mg Oral QHS    [START ON 5/8/2020] methotrexate (RHEUMATREX) tablet 5 mg  5 mg Oral every Friday    montelukast (SINGULAIR) tablet 10 mg  10 mg Oral DAILY    pantoprazole (PROTONIX) tablet 40 mg  40 mg Oral ACB    sodium chloride (NS) flush 5-40 mL  5-40 mL IntraVENous Q8H    sodium chloride (NS) flush 5-40 mL  5-40 mL IntraVENous PRN    levoFLOXacin (LEVAQUIN) 750 mg in D5W IVPB  750 mg IntraVENous Q48H    acetaminophen (TYLENOL) tablet 650 mg  650 mg Oral Q4H PRN    heparin (porcine) injection 5,000 Units  5,000 Units SubCUTAneous Q8H    insulin glargine (LANTUS) injection 10 Units  0.2 Units/kg SubCUTAneous QHS    glucose chewable tablet 16 g  4 Tab Oral PRN    glucagon (GLUCAGEN) injection 1 mg  1 mg IntraMUSCular PRN    dextrose 10 % infusion 125-250 mL  125-250 mL IntraVENous PRN    insulin lispro (HUMALOG) injection   SubCUTAneous AC&HS          Total time spent with patient: 35 minutes                  Care Plan discussed with: Patient, Nursing Staff and >50% of time spent in counseling and coordination of care    Discussed:  Care Plan    Prophylaxis:  Hep SQ    Disposition:  Home w/Family           This document in whole or part of it has been produced using voice recognition software. Unrecognized errors in transcription may be present.     Attending Physician: Jostin Anderson MD

## 2020-05-06 NOTE — ROUTINE PROCESS
Bedside shift change report given to VLADIMIR Wray (oncoming nurse) by Joanie Benson RN (offgoing nurse). Report included the following information SBAR, Kardex, Intake/Output and MAR.

## 2020-05-06 NOTE — ROUTINE PROCESS
1910 Bedside and Verbal shift change report given to Suzan (oncoming nurse) by Geovanni Hunter RN 
 (offgoing nurse). Report included the following information Kardex, MAR and Recent Results.

## 2020-05-06 NOTE — PROGRESS NOTES
Problem: General Medical Care Plan  Goal: *Vital signs within specified parameters  Outcome: Progressing Towards Goal  Goal: *Labs within defined limits  Outcome: Progressing Towards Goal  Goal: *Absence of infection signs and symptoms  Outcome: Progressing Towards Goal  Goal: *Optimal pain control at patient's stated goal  Outcome: Progressing Towards Goal  Goal: *Skin integrity maintained  Outcome: Progressing Towards Goal  Goal: *Fluid volume balance  Outcome: Progressing Towards Goal  Goal: *Optimize nutritional status  Outcome: Progressing Towards Goal  Goal: *Anxiety reduced or absent  Outcome: Progressing Towards Goal  Goal: *Progressive mobility and function (eg: ADL's)  Outcome: Progressing Towards Goal     Problem: Pain  Goal: *Control of Pain  Outcome: Progressing Towards Goal  Goal: *PALLIATIVE CARE:  Alleviation of Pain  Outcome: Progressing Towards Goal     Problem: Falls - Risk of  Goal: *Absence of Falls  Description: Document Brittney Fall Risk and appropriate interventions in the flowsheet.   Outcome: Progressing Towards Goal  Note: Fall Risk Interventions:            Medication Interventions: Patient to call before getting OOB                   Problem: Chronic Obstructive Pulmonary Disease (COPD)  Goal: *Oxygen saturation during activity within specified parameters  Outcome: Progressing Towards Goal  Goal: *Able to remain out of bed as prescribed  Outcome: Progressing Towards Goal  Goal: *Absence of hypoxia  Outcome: Progressing Towards Goal  Goal: *Optimize nutritional status  Outcome: Progressing Towards Goal     Problem: Patient Education: Go to Patient Education Activity  Goal: Patient/Family Education  Outcome: Progressing Towards Goal     Problem: Gas Exchange - Impaired  Goal: *Absence of hypoxia  Outcome: Progressing Towards Goal     Problem: Patient Education: Go to Patient Education Activity  Goal: Patient/Family Education  Outcome: Progressing Towards Goal     Problem: Discharge Planning  Goal: *Discharge to safe environment  Outcome: Progressing Towards Goal

## 2020-05-07 VITALS
WEIGHT: 120.2 LBS | RESPIRATION RATE: 22 BRPM | BODY MASS INDEX: 22.12 KG/M2 | HEIGHT: 62 IN | TEMPERATURE: 98.4 F | OXYGEN SATURATION: 97 % | HEART RATE: 95 BPM | SYSTOLIC BLOOD PRESSURE: 148 MMHG | DIASTOLIC BLOOD PRESSURE: 80 MMHG

## 2020-05-07 LAB
ANION GAP SERPL CALC-SCNC: 8 MMOL/L (ref 3–18)
BASOPHILS # BLD: 0 K/UL (ref 0–0.1)
BASOPHILS NFR BLD: 0 % (ref 0–2)
BUN SERPL-MCNC: 5 MG/DL (ref 7–18)
BUN/CREAT SERPL: 8 (ref 12–20)
CALCIUM SERPL-MCNC: 7.4 MG/DL (ref 8.5–10.1)
CHLORIDE SERPL-SCNC: 109 MMOL/L (ref 100–111)
CO2 SERPL-SCNC: 22 MMOL/L (ref 21–32)
CREAT SERPL-MCNC: 0.63 MG/DL (ref 0.6–1.3)
DIFFERENTIAL METHOD BLD: ABNORMAL
EOSINOPHIL # BLD: 0.1 K/UL (ref 0–0.4)
EOSINOPHIL NFR BLD: 1 % (ref 0–5)
ERYTHROCYTE [DISTWIDTH] IN BLOOD BY AUTOMATED COUNT: 14.9 % (ref 11.6–14.5)
GLUCOSE BLD STRIP.AUTO-MCNC: 78 MG/DL (ref 70–110)
GLUCOSE SERPL-MCNC: 80 MG/DL (ref 74–99)
HCT VFR BLD AUTO: 24.3 % (ref 35–45)
HGB BLD-MCNC: 7.8 G/DL (ref 12–16)
LYMPHOCYTES # BLD: 1.4 K/UL (ref 0.9–3.6)
LYMPHOCYTES NFR BLD: 9 % (ref 21–52)
MAGNESIUM SERPL-MCNC: 1.3 MG/DL (ref 1.6–2.6)
MCH RBC QN AUTO: 27.5 PG (ref 24–34)
MCHC RBC AUTO-ENTMCNC: 32.1 G/DL (ref 31–37)
MCV RBC AUTO: 85.6 FL (ref 74–97)
MONOCYTES # BLD: 1.8 K/UL (ref 0.05–1.2)
MONOCYTES NFR BLD: 12 % (ref 3–10)
NEUTS SEG # BLD: 12 K/UL (ref 1.8–8)
NEUTS SEG NFR BLD: 78 % (ref 40–73)
PHOSPHATE SERPL-MCNC: 2.3 MG/DL (ref 2.5–4.9)
PLATELET # BLD AUTO: 797 K/UL (ref 135–420)
PMV BLD AUTO: 8.9 FL (ref 9.2–11.8)
POTASSIUM SERPL-SCNC: 2.8 MMOL/L (ref 3.5–5.5)
RBC # BLD AUTO: 2.84 M/UL (ref 4.2–5.3)
SODIUM SERPL-SCNC: 139 MMOL/L (ref 136–145)
WBC # BLD AUTO: 15.3 K/UL (ref 4.6–13.2)

## 2020-05-07 PROCEDURE — 94640 AIRWAY INHALATION TREATMENT: CPT

## 2020-05-07 PROCEDURE — 80048 BASIC METABOLIC PNL TOTAL CA: CPT

## 2020-05-07 PROCEDURE — 82962 GLUCOSE BLOOD TEST: CPT

## 2020-05-07 PROCEDURE — 83735 ASSAY OF MAGNESIUM: CPT

## 2020-05-07 PROCEDURE — 74011000250 HC RX REV CODE- 250: Performed by: INTERNAL MEDICINE

## 2020-05-07 PROCEDURE — 74011250636 HC RX REV CODE- 250/636: Performed by: INTERNAL MEDICINE

## 2020-05-07 PROCEDURE — 85025 COMPLETE CBC W/AUTO DIFF WBC: CPT

## 2020-05-07 PROCEDURE — 94761 N-INVAS EAR/PLS OXIMETRY MLT: CPT

## 2020-05-07 PROCEDURE — 74011250637 HC RX REV CODE- 250/637: Performed by: INTERNAL MEDICINE

## 2020-05-07 PROCEDURE — 36415 COLL VENOUS BLD VENIPUNCTURE: CPT

## 2020-05-07 PROCEDURE — 84100 ASSAY OF PHOSPHORUS: CPT

## 2020-05-07 RX ORDER — LEVOFLOXACIN 750 MG/1
750 TABLET ORAL ONCE
Qty: 1 TAB | Refills: 0 | Status: SHIPPED
Start: 2020-05-07 | End: 2020-05-07 | Stop reason: SDUPTHER

## 2020-05-07 RX ORDER — POTASSIUM CHLORIDE 20 MEQ/1
40 TABLET, EXTENDED RELEASE ORAL 2 TIMES DAILY
Status: DISCONTINUED | OUTPATIENT
Start: 2020-05-07 | End: 2020-05-07 | Stop reason: HOSPADM

## 2020-05-07 RX ORDER — UREA 10 %
2 LOTION (ML) TOPICAL 2 TIMES DAILY
Status: DISCONTINUED | OUTPATIENT
Start: 2020-05-07 | End: 2020-05-07 | Stop reason: HOSPADM

## 2020-05-07 RX ORDER — MAGNESIUM SULFATE HEPTAHYDRATE 40 MG/ML
2 INJECTION, SOLUTION INTRAVENOUS ONCE
Status: COMPLETED | OUTPATIENT
Start: 2020-05-07 | End: 2020-05-07

## 2020-05-07 RX ORDER — POTASSIUM CHLORIDE 20 MEQ/1
40 TABLET, EXTENDED RELEASE ORAL 2 TIMES DAILY
Qty: 2 TAB | Refills: 0 | Status: SHIPPED | OUTPATIENT
Start: 2020-05-07

## 2020-05-07 RX ORDER — LEVOFLOXACIN 750 MG/1
750 TABLET ORAL DAILY
Qty: 5 TAB | Refills: 0 | Status: SHIPPED | OUTPATIENT
Start: 2020-05-07 | End: 2020-05-12

## 2020-05-07 RX ADMIN — BUSPIRONE HYDROCHLORIDE 7.5 MG: 7.5 TABLET ORAL at 08:27

## 2020-05-07 RX ADMIN — IPRATROPIUM BROMIDE AND ALBUTEROL SULFATE 3 ML: .5; 3 SOLUTION RESPIRATORY (INHALATION) at 09:12

## 2020-05-07 RX ADMIN — ACETAMINOPHEN 650 MG: 325 TABLET, FILM COATED ORAL at 01:06

## 2020-05-07 RX ADMIN — FLUOXETINE 10 MG: 10 CAPSULE ORAL at 13:22

## 2020-05-07 RX ADMIN — PANTOPRAZOLE SODIUM 40 MG: 40 TABLET, DELAYED RELEASE ORAL at 08:27

## 2020-05-07 RX ADMIN — POTASSIUM CHLORIDE 40 MEQ: 1500 TABLET, EXTENDED RELEASE ORAL at 13:22

## 2020-05-07 RX ADMIN — MAGNESIUM SULFATE HEPTAHYDRATE 2 G: 40 INJECTION, SOLUTION INTRAVENOUS at 13:18

## 2020-05-07 RX ADMIN — BUDESONIDE 500 MCG: 0.5 INHALANT RESPIRATORY (INHALATION) at 09:12

## 2020-05-07 RX ADMIN — Medication 2 TABLET: at 13:50

## 2020-05-07 RX ADMIN — Medication 10 ML: at 13:23

## 2020-05-07 RX ADMIN — MONTELUKAST 10 MG: 10 TABLET, FILM COATED ORAL at 08:27

## 2020-05-07 RX ADMIN — ARFORMOTEROL TARTRATE 15 MCG: 15 SOLUTION RESPIRATORY (INHALATION) at 09:12

## 2020-05-07 RX ADMIN — ACETAMINOPHEN 650 MG: 325 TABLET, FILM COATED ORAL at 10:18

## 2020-05-07 RX ADMIN — POTASSIUM & SODIUM PHOSPHATES POWDER PACK 280-160-250 MG 1 PACKET: 280-160-250 PACK at 13:23

## 2020-05-07 RX ADMIN — HEPARIN SODIUM 5000 UNITS: 5000 INJECTION INTRAVENOUS; SUBCUTANEOUS at 05:43

## 2020-05-07 NOTE — PROGRESS NOTES
0800  Received pt on bed, no SOB at this time but was reported she gets SOB on exertion, monitor. Had 2 loose stool last night per pt she was told due to taking antibiotics. 1030  Noted she was SOB  While talking to me, no chest pain, she said she had 3 loose stool, not watery, told her to call so I can see consistency. Dr Mirza Fountain pageyefri, per pt request for Immodium. 1130  02  Sat at room air at rest,. 94%, 02 sat  At room air Walking for 5 minutes 95%, was SOB, walk in the hallway with 02 at 2 liters,, sat was 97%, Dr Pavel Herrera on the floor and is aware. 1600  Discharge instructions given no chest pain and no SOB, medicine to be  at [facility].

## 2020-05-07 NOTE — PROGRESS NOTES
Respiratory Therapy Assessment Care Plan    Patient: Inna Mccollum 78 y.o. female 5/7/2020 9:16 AM    Sepsis [facility]) [A41.9]      Chest X-RAY:   Results from Hospital Encounter encounter on 05/04/20   XR CHEST PORT    Impression IMPRESSION:    Coarsened interstitial markings. XR CHEST PORT    Impression IMPRESSION:    Mild left basilar atelectasis and/or streaky infiltrate. Results from [place] encounter on 02/15/20   XR CHEST SNGL V    Impression IMPRESSION:    No active cardiopulmonary disease. Vital Signs:   Visit Vitals  /73 (BP 1 Location: Right arm, BP Patient Position: At rest)   Pulse 89   Temp 97.4 °F (36.3 °C)   Resp 22   Wt 58.6 kg (129 lb 3.2 oz)   SpO2 93%   BMI 23.63 kg/m²         Indications for treatment: COPD      Plan of care: Duoneb TID/Brovana Q12H/Pulmicort Q12H. Goal: Continue to monitor respiratory status.

## 2020-05-07 NOTE — PROGRESS NOTES
Problem: General Medical Care Plan  Goal: *Vital signs within specified parameters  Outcome: Progressing Towards Goal  Goal: *Labs within defined limits  Outcome: Progressing Towards Goal  Goal: *Absence of infection signs and symptoms  Outcome: Progressing Towards Goal  Goal: *Optimal pain control at patient's stated goal  Outcome: Progressing Towards Goal  Goal: *Skin integrity maintained  Outcome: Progressing Towards Goal  Goal: *Fluid volume balance  Outcome: Progressing Towards Goal  Goal: *Optimize nutritional status  Outcome: Progressing Towards Goal  Goal: *Anxiety reduced or absent  Outcome: Progressing Towards Goal  Goal: *Progressive mobility and function (eg: ADL's)  Outcome: Progressing Towards Goal     Problem: Pain  Goal: *Control of Pain  Outcome: Progressing Towards Goal  Goal: *PALLIATIVE CARE:  Alleviation of Pain  Outcome: Progressing Towards Goal     Problem: Falls - Risk of  Goal: *Absence of Falls  Description: Document Brittney Fall Risk and appropriate interventions in the flowsheet.   Outcome: Progressing Towards Goal  Note: Fall Risk Interventions:            Medication Interventions: Patient to call before getting OOB, Teach patient to arise slowly, Utilize gait belt for transfers/ambulation                   Problem: Chronic Obstructive Pulmonary Disease (COPD)  Goal: *Oxygen saturation during activity within specified parameters  Outcome: Progressing Towards Goal  Goal: *Able to remain out of bed as prescribed  Outcome: Progressing Towards Goal  Goal: *Absence of hypoxia  Outcome: Progressing Towards Goal  Goal: *Optimize nutritional status  Outcome: Progressing Towards Goal     Problem: Patient Education: Go to Patient Education Activity  Goal: Patient/Family Education  Outcome: Progressing Towards Goal     Problem: Gas Exchange - Impaired  Goal: *Absence of hypoxia  Outcome: Progressing Towards Goal     Problem: Patient Education: Go to Patient Education Activity  Goal: Patient/Family Education  Outcome: Progressing Towards Goal     Problem: Discharge Planning  Goal: *Discharge to safe environment  Outcome: Progressing Towards Goal

## 2020-05-07 NOTE — DISCHARGE INSTRUCTIONS
DISCHARGE SUMMARY from Nurse    PATIENT INSTRUCTIONS:    After general anesthesia or intravenous sedation, for 24 hours or while taking prescription Narcotics:  · Limit your activities  · Do not drive and operate hazardous machinery  · Do not make important personal or business decisions  · Do  not drink alcoholic beverages  · If you have not urinated within 8 hours after discharge, please contact your surgeon on call. Report the following to your surgeon:  · Excessive pain, swelling, redness or odor of or around the surgical area  · Temperature over 100.5  · Nausea and vomiting lasting longer than 4 hours or if unable to take medications  · Any signs of decreased circulation or nerve impairment to extremity: change in color, persistent  numbness, tingling, coldness or increase pain  · Any questions    What to do at Home:  Recommended activity: Activity as tolerated and no driving for today and Ambulate in house,     If you experience any of the following symptoms like increasing pain  , please follow up with primary MD  .    *  Please give a list of your current medications to your Primary Care Provider. *  Please update this list whenever your medications are discontinued, doses are      changed, or new medications (including over-the-counter products) are added. *  Please carry medication information at all times in case of emergency situations. These are general instructions for a healthy lifestyle:    No smoking/ No tobacco products/ Avoid exposure to second hand smoke  Surgeon General's Warning:  Quitting smoking now greatly reduces serious risk to your health.     Obesity, smoking, and sedentary lifestyle greatly increases your risk for illness    A healthy diet, regular physical exercise & weight monitoring are important for maintaining a healthy lifestyle    You may be retaining fluid if you have a history of heart failure or if you experience any of the following symptoms:  Weight gain of 3 pounds or more overnight or 5 pounds in a week, increased swelling in our hands or feet or shortness of breath while lying flat in bed. Please call your doctor as soon as you notice any of these symptoms; do not wait until your next office visit. Patient armband removed and shredded    MyChart Activation    Thank you for requesting access to [employer]. Please follow the instructions below to securely access and download your online medical record. [employer] allows you to send messages to your doctor, view your test results, renew your prescriptions, schedule appointments, and more. How Do I Sign Up? 1. In your internet browser, go to [email or web address]  2. Click on the First Time User? Click Here link in the Sign In box. You will be redirect to the New Member Sign Up page. 3. Enter your [employer] Access Code exactly as it appears below. You will not need to use this code after youve completed the sign-up process. If you do not sign up before the expiration date, you must request a new code. [employer] Access Code: Activation code not generated  Current [employer] Status: Active (This is the date your [employer] access code will )    4. Enter the last four digits of your Social Security Number (xxxx) and Date of Birth (mm/dd/yyyy) as indicated and click Submit. You will be taken to the next sign-up page. 5. Create a [employer] ID. This will be your [employer] login ID and cannot be changed, so think of one that is secure and easy to remember. 6. Create a [employer] password. You can change your password at any time. 7. Enter your Password Reset Question and Answer. This can be used at a later time if you forget your password. 8. Enter your e-mail address. You will receive e-mail notification when new information is available in [address] 9. Click Sign Up. You can now view and download portions of your medical record.   10. Click the Download Summary menu link to download a portable copy of your medical information. Additional Information    If you have questions, please visit the Frequently Asked Questions section of the [employer] website at [email or web address] Remember, [employer] is NOT to be used for urgent needs. For medical emergencies, dial 911. MyChart Activation    Thank you for requesting access to [employer]. Please follow the instructions below to securely access and download your online medical record. [employer] allows you to send messages to your doctor, view your test results, renew your prescriptions, schedule appointments, and more. How Do I Sign Up?    11. In your internet browser, go to [email or web address]  12. Click on the First Time User? Click Here link in the Sign In box. You will be redirect to the New Member Sign Up page. 15. Enter your [employer] Access Code exactly as it appears below. You will not need to use this code after youve completed the sign-up process. If you do not sign up before the expiration date, you must request a new code. [employer] Access Code: Activation code not generated  Current [employer] Status: Active (This is the date your [employer] access code will )    14. Enter the last four digits of your Social Security Number (xxxx) and Date of Birth (mm/dd/yyyy) as indicated and click Submit. You will be taken to the next sign-up page. 15. Create a [employer] ID. This will be your [employer] login ID and cannot be changed, so think of one that is secure and easy to remember. 12. Create a [employer] password. You can change your password at any time. 16. Enter your Password Reset Question and Answer. This can be used at a later time if you forget your password. 25. Enter your e-mail address. You will receive e-mail notification when new information is available in [address] 19. Click Sign Up. You can now view and download portions of your medical record.   20. Click the Download Summary menu link to download a portable copy of your medical information. Additional Information    If you have questions, please visit the Frequently Asked Questions section of the [employer] website at [email or web address] Remember, [employer] is NOT to be used for urgent needs. For medical emergencies, dial 911. The discharge information has been reviewed with the patient and spouse. The patient and spouse verbalized understanding. Discharge medications reviewed with the patient and spouse and appropriate educational materials and side effects teaching were provided.   ___________________________________________________________________________________________________________________________________

## 2020-05-07 NOTE — DISCHARGE SUMMARY
Discharge Summary      [facility] service    Patient ID:  Mehrdad Garcia, 78 y.o., female  : 1950    Admit Date: 2020  Discharge Date:  5/15/2020  Length of stay: 3 day(s)    PCP:  Aleks Butler NP    Chief Complaint   Patient presents with    Shortness of Breath       Discharge Diagnosis:     Hospital Problems  Never Reviewed          Codes Class Noted POA    Chronic obstructive pulmonary disease with acute exacerbation ([facility].) ICD-10-CM: J44.1  ICD-9-CM: 491.21  2020 Yes        DM (diabetes mellitus) ([facility] ICD-10-CM: E11.9  ICD-9-CM: 250.00  2020 Yes        Dehydration ICD-10-CM: E86.0  ICD-9-CM: 276.51  2020 Yes        Hypomagnesemia ICD-10-CM: E83.42  ICD-9-CM: 275.2  2020 Yes        Elevated WBC count ICD-10-CM: V22.303  ICD-9-CM: 288.60  2020 Yes        * (Principal) Sepsis ([facility] ICD-10-CM: A41.9  ICD-9-CM: 038.9, 995.91  2/15/2020 Yes              Discharge instructions:    #  Discharge Diet:            Diet: Resume previous diet  # Discharge activity and restrictions: Activity as tolerated    # Follow-up appointments: Follow-up Information     Follow up With Specialties Details Why Contact Info    Aleks Butler NP Nurse Practitioner In 1 week  [address] [place] [address] [facility] Koroma MD Pulmonary Disease, Geriatric Medicine Schedule an appointment as soon as possible for a visit  [facility] [address]  [address] [facility]  [phone]              [facility] on Admission (per admitting physician):  Ms. Benjaman Dakin is a 78 y.o.  female who is admitted for shortness of breath and pneumonia. Ms. Benjaman Dakin with past medical history as noted below , presented to the Emergency Department today  complaining of above. .Triage and ER notes noted. Patient seen by urgent care [facility] on Friday and today.   They did chest x-ray and they told her that she had pneumonia or bronchitis because she is very sick she referred to the ER. Patient reports she had a cover test on Saturday and told to be negative. She presented with high white BC  but it is chronic according to her chart anyway. Her respiratory rate and heart rate was high in the ER according to the ER MD and the chest x-ray reported with possible left lower lobe pneumonia with streaks  density. She report shortness of breath and cough for about 4 to 5 days, using inhalers and nebulizers twice a day at home without help. No fever they could document but she feels feverish and chills. She came in today because of persistent shortness of breath and cough. She lives with her . She was afebrile in the ER. She has history of rheumatoid arthritis and treated with immunosuppressant. For further details and initial management please refer to H/P. Hospital Course:      Patient feel so good and her breathing much improved, at her base line , willing to go home. 6 min walking test indicate she does not need ome O@. Instruction on how to use inhaler dw patient ( she is using it wrong and taught the proper way of using it). She will follow with her [facility] clinic after DC. She has at home albuterol inhaler to use twice a day and prn albuterol inhaler. Instructed to continue according her pulmonologist instructions. Deny other complains. By Problems :     # Sepsis. Likely due to respiratory infection or COPD exacerbation. Viral infection still is a possibility, patient presented with dry cough, feels chills warm and shivering, and she is immunosuppressant.  Although She reported tested negative on Saturday prior to admission for COVID, test repeated and was negative again. Treated with IV antibiotic Levaquin.  IVF hydration.  Admitted to telemetry bed for further management. UrineTest for Ag for pneumonia negative. COVID-19 ruled out, droplet plus isolation.   Blood cultures negative to date. Normal lactic acid and pro calcitonin level.   Elevated white blood cells, noted a chronic elevation as well , probably due to chronic use of steroid. No spiking of temperature over the last 24 hours.        # COPD with acute exacerbation. Clinically much improved. As above. She is on low-dose po prednisone at home. Nebulizer regular and prn . Home regimen . Antibiotic course as ordered . CXR chest x-ray with no report of pneumonia. . She is on room air now. Needs 6-minute walk check for oxygen need prior to discharge.     # Diabetes mellitus. Provide SSI, hypoglycemia protocol and frequent Accu checks.   Provide SSI, hypoglycemia protocol and frequent Accu checks.    Diabetic Diet        # Acute kidney injury. Seems prerenal.  Dehydration. Improved with IV hydration and increase p.o. intake. follow with PCP     # Hypomagnesemia , hypokalemia. replete      Discharge condition:  good, improved and stable    Disposition:  Home    Procedures:   * No surgery found *      Consultants: None  None    Physical Exam on Discharge:  Visit Vitals  /80 (BP 1 Location: Right arm, BP Patient Position: Pre-activity)   Pulse 95   Temp 98.4 °F (36.9 °C)   Resp 22   Ht 5' 2\" (1.575 m)   Wt 54.5 kg (120 lb 3.2 oz)   SpO2 97%   BMI 21.98 kg/m²   Gen:   appear stated age, Well-developed, in no acute distress  HEENT:  Head atraumatic, normocephalic , hearing intact to voice, moist mucous membranes. Neck:   Trachea midline , No apparent JVD, Supple,   Resp: CTA, normal effort ,  no accessory muscle use,Bilateral BS present  Card:   normal S1, S2 without Gallop . No Significant lower leg peripheral edema. Abd:  Soft, non-tender, non-distended, bowel sounds are present . Musc:  No cyanosis or clubbing. Skin:  No rashes or ulcers, skin turgor is good. Neuro:  Cranial nerves are grossly intact, no clear area of focal motor weakness, follows commands appropriately. Psych:  Good insight, oriented to person, place and time, alert.     Hospitalization and discharge instructions d/c in details with : patient ,  Nursing/CM     Discharge medications :  Discharge Medication List as of 5/7/2020  3:41 PM      START taking these medications    Details   potassium chloride (K-DUR, KLOR-CON) 20 mEq tablet Take 2 Tabs by mouth two (2) times a day., Normal, Disp-2 Tab, R-0      L.acidoph & parac-S.therm-Bifido (FAINA Q2/RISAQUAD-2) 16 billion cell cap cap Take 1 Cap by mouth daily for 14 days. , Normal, Disp-14 Cap, R-0         CONTINUE these medications which have CHANGED    Details   levoFLOXacin (Levaquin) 750 mg tablet Take 1 Tab by mouth daily for 5 doses. , Normal, Disp-5 Tab, R-0         CONTINUE these medications which have NOT CHANGED    Details   predniSONE (DELTASONE) 1 mg tablet Take  by mouth daily (with breakfast). , Historical Med      abatacept (ORENCIA) 250 mg injection by IntraVENous route once., Historical Med      methotrexate (RHEUMATREX) 2.5 mg tablet Take  by mouth. 5mg takes on fridays, Historical Med      folic acid (FOLVITE) 1 mg tablet Take  by mouth daily. , Historical Med      zoledronic acid (RECLAST) 5 mg/100 mL pgbk 5 mg by IntraVENous route once. Once per year, Historical Med      fluticasone propion-salmeteroL (ADVAIR DISKUS) 250-50 mcg/dose diskus inhaler Take 1 Puff by inhalation every twelve (12) hours. , Historical Med      montelukast (SINGULAIR) 10 mg tablet Take 10 mg by mouth daily. , Historical Med      albuterol (PROAIR HFA) 90 mcg/actuation inhaler Take  by inhalation. , Historical Med      loratadine (CLARITIN) 10 mg tablet Take 10 mg by mouth., Historical Med      FLUoxetine (PROZAC) 20 mg capsule Take  by mouth daily. , Historical Med      omeprazole (PRILOSEC) 20 mg capsule Take 20 mg by mouth daily. , Historical Med      metFORMIN (GLUCOPHAGE) 500 mg tablet Take  by mouth two (2) times daily (with meals). , Historical Med      busPIRone (BUSPAR) 7.5 mg tablet Take 7.5 mg by mouth three (3) times daily. , Historical Med      melatonin 5 mg tablet Take  by mouth nightly. 10mg prn at night, Historical Med                 Most Recent Labs:       No results for input(s): WBC, HGB, HCT, PLT, HGBEXT, HCTEXT, PLTEXT, HGBEXT, HCTEXT, PLTEXT in the last 72 hours. No results for input(s): NA, K, CL, CO2, GLU, BUN, CREA, CA, MG, PHOS, ALB, TBIL, TBILI, SGOT, ALT, INR, INREXT, INREXT in the last 72 hours. Lab Results   Component Value Date/Time    Glucose (POC) 78 05/07/2020 08:36 AM    Glucose (POC) 91 05/06/2020 09:33 PM    Glucose (POC) 84 05/06/2020 04:42 PM    Glucose (POC) 90 05/06/2020 11:39 AM    Glucose (POC) 81 05/06/2020 09:06 AM     No results for input(s): PH, PCO2, PO2, HCO3, FIO2 in the last 72 hours. No results for input(s): INR, INREXT, INREXT in the last 72 hours. No results found for: SDES  Lab Results   Component Value Date/Time    Culture result: NO GROWTH 6 DAYS 05/04/2020 11:00 AM    Culture result: NO GROWTH 6 DAYS 05/04/2020 10:45 AM    Culture result: NO GROWTH 6 DAYS 02/15/2020 05:55 PM       All Cardiac Markers in the last 24 hours: No results found for: CPK, CK, CKMMB, CKMB, RCK3, CKMBT, CKNDX, CKND1, MARTIN, TROPT, TROIQ, JO, TROPT, TNIPOC, BNP, BNPP    XR Results:  Results from Hospital Encounter encounter on 05/04/20   XR CHEST PORT    Narrative EXAM: XR CHEST PORT    CLINICAL INDICATION/HISTORY: SOB  -Additional: None    COMPARISON: One day prior    TECHNIQUE: Portable frontal view of the chest    _______________    FINDINGS:    SUPPORT DEVICES: None. HEART AND MEDIASTINUM: Cardiomediastinal silhouette within normal limits. LUNGS AND PLEURAL SPACES: Coarsened interstitial markings. No dense focal  consolidation, large effusion or pneumothorax.    _______________      Impression IMPRESSION:    Coarsened interstitial markings. CT Results:  Results from [place] encounter on 02/15/20   CTA CHEST W OR W WO CONT    Narrative EXAM: CTA chest    INDICATION: Cough    COMPARISON: Single view chest also done today.  CT chest 6/4/2019 and 3/13/2019    TECHNIQUE: Axial CT imaging from the thoracic inlet through the diaphragm with  intravenous contrast. Coronal and sagittal MIP reformats were generated. One or more dose reduction techniques were used on this CT: automated exposure  control, adjustment of the mAs and/or kVp according to patient size, and  iterative reconstruction techniques. The specific techniques used on this CT  exam have been documented in the patient's electronic medical record. Digital  Imaging and Communications in Medicine (DICOM) format image data are available  to nonaffiliated external healthcare facilities or entities on a secure, media  free, reciprocally searchable basis with patient authorization for at least a  12-month period after this study. _______________    FINDINGS:    EXAM QUALITY: Adequate. PULMONARY ARTERIES: No evidence of pulmonary embolism. MEDIASTINUM: Normal heart size. No evidence of right heart strain. Aorta is  unremarkable. No pericardial effusion. LUNGS: There is a pleural-based area of opacity in the posterior inferior right  lower lobe which is new compared to prior exam. Likely this represents acute  infiltrate consistent with pneumonia. Stable scarring left lower lobe. PLEURA: Normal.    AIRWAY: There is bronchiectasis in the left lower lobe which is stable. There is  bronchial wall thickening with mucus plugging in the right lower lobe which is a  change compared to prior exam and likely related to the presumed acute  infiltrate. LYMPH NODES: There is new subcarinal adenopathy measuring up to 13 mm in the  superior right paratracheal lymph node measuring 10 mm which is also new. Both  are probably reactive. UPPER ABDOMEN: Unremarkable. OTHER: No acute or aggressive osseous abnormalities identified. _______________      Impression IMPRESSION:    1. No evidence of pulmonary embolism.     2.  New pleural-based opacity posterior inferior right lower lobe likely  represents acute infiltrate consistent with pneumonia. There is some adjacent  bronchial wall thickening and mucus plugging. 3. Stable left lower lobe bronchiectasis. 4. New mediastinal adenopathy which is likely reactive. Recommend short-term follow-up chest CT in 3 months to reevaluate the presumed  right lower lobe infiltrate and reactive adenopathy. Preliminary report was provided by radiology resident. Total discharge time 35 minutes of which more than 50 % spent on counseling and coordination of care. This document in whole or part of it has been produced using voice recognition software. Unrecognized errors in transcription may be present. Esetla Daley MD  Hospitalist  [facility]. medical group. Hospitalist Division.   May 15, 2020  12:34 PM

## 2020-05-07 NOTE — PROGRESS NOTES
Problem: Discharge Planning  Goal: *Discharge to safe environment  Outcome: resolved/met     Plan  Home    Pt does not qualify for home O2 at this time    dc'd home, no services ordered. Care Management Interventions  PCP Verified by CM: Yes(Carol Sinclair NP, last seen about i month ago)  Palliative Care Criteria Met (RRAT>21 & CHF Dx)?: No  Mode of Transport at Discharge:  Other (see comment)(family)  Transition of Care Consult (CM Consult): Discharge Planning  Discharge Durable Medical Equipment: No  Physical Therapy Consult: No  Occupational Therapy Consult: No  Speech Therapy Consult: No  Current Support Network: Lives with Spouse  Confirm Follow Up Transport: Family  Discharge Location  Discharge Placement: Home

## 2020-05-07 NOTE — PROGRESS NOTES
NUTRITION INITIAL ASSESSMENT/PLAN OF CARE      RECOMMENDATIONS:   1. Adjust to a CC Diet  2. Monitor labs, weight and PO intake  3. RD to follow     GOALS:   1. PO intake meets >75% of protein/calorie needs by 5/12      ASSESSMENT:   Wt status is classified as normal per Body mass index is 21.98 kg/m². However, Pt at nutrition risk d/t BMI <23 and age >65 years. PO intake improved now. Labs noted. Nutrition recommendations listed. RD to follow. Nutrition Diagnoses:   None at this time. SUBJECTIVE/OBJECTIVE:   Pt admitted for sepsis and transferred from ICU to [facility] last night. Pt seen in room this afternoon. Denies any N/V/ABD pain. Reports normally having a good appetite and consuming 2 meals per day and snacks at home. NKFA or problems chewing/swallowing and stable weight PTA; -120 lb. Used bed scale during visit; 120.2 lb. Encouraged to continue to increase PO intake. Plan to D/C later today. Information Obtained from:    [x] Chart Review   [x] Patient   [] Family/Caregiver   [] Nurse/Physician   [] Interdisciplinary Meeting/Rounds      Diet: Regular Diet  Medications: [x] Reviewed    Heparin, Lantus, Humalog, Floranex, Levaquin, Protonix,   Allergies: [x] Reviewed   Encounter Diagnoses     ICD-10-CM ICD-9-CM   1. Pneumonia of left lower lobe due to infectious organism ([facility] J18.1 486   2.  Sepsis, due to unspecified organism, unspecified whether acute organ dysfunction present ([facility] A41.9 038.9     995.91     Past Medical History:   Diagnosis Date    Asthma     [facility] obstructive pulmonary disease ([facility]     Diabetes ([facility]     Heart murmur     Menopause       Labs:    Lab Results   Component Value Date/Time    Sodium 139 05/07/2020 03:49 AM    Potassium 2.8 (LL) 05/07/2020 03:49 AM    Chloride 109 05/07/2020 03:49 AM    CO2 22 05/07/2020 03:49 AM    Anion gap 8 05/07/2020 03:49 AM    Glucose 80 05/07/2020 03:49 AM    BUN 5 (L) 05/07/2020 03:49 AM    Creatinine 0.63 05/07/2020 03:49 AM Calcium 7.4 (L) 05/07/2020 03:49 AM    Magnesium 1.3 (L) 05/07/2020 03:49 AM    Phosphorus 2.3 (L) 05/07/2020 03:49 AM    Albumin 3.8 02/15/2020 04:59 PM     Lab Results   Component Value Date/Time    GLU 80 05/07/2020 03:49 AM    GLUCPOC 78 05/07/2020 08:36 AM    GLUCPOC 91 05/06/2020 09:33 PM     Anthropometrics: BMI (calculated): 22  Last 3 Recorded Weights in this Encounter    05/05/20 2157 05/06/20 2218 05/07/20 1803   Weight: 56.8 kg (125 lb 4.8 oz) 58.6 kg (129 lb 3.2 oz) 54.5 kg (120 lb 3.2 oz)      Ht Readings from Last 1 Encounters:   05/06/20 5' 2\" (1.575 m)       Documented Weight History:  Weight Metrics 5/7/2020 2/17/2020   Weight 120 lb 3.2 oz 120 lb   BMI 21.98 kg/m2 21.95 kg/m2       Patient Vitals for the past 100 hrs:   % Diet Eaten   05/07/20 0912 75 %   05/07/20 0818 0 %   05/06/20 1830 75 %   05/06/20 1500 75 %   05/06/20 1000 90 %         IBW: 110 lb %IBW: 109% UBW: 116-120 lb   [] Weight Loss [] Weight Gain [x] Weight Stable    Estimated Nutrition Needs: [x] MSJ x 1.3  [x] Other: 30 kcal/kg  Calories: 5035-7739 kcal Based on:   [x] Actual BW    Protein:   55-65 g Based on:   [x] Actual BW x 1.0-1.2 gm/kg   Fluid:       5701-0491 ml      [x] No Cultural, Buddhism or ethnic dietary need identified.     [] Cultural, Buddhism and ethnic food preferences identified and addressed     Wt Status:  [x] Normal (18.6 - 24.9) [] Underweight (<18.5) [] Overweight (25 - 29.9) [] Mild Obesity (30 - 34.9)  [] Moderate Obesity (35 - 39.9) [] Morbid Obesity (40+)       Nutrition Problems Identified:   [x] Suboptimal PO intake (improving)  [] Food Allergies  [] Difficulty chewing/swallowing/poor dentition  [] Constipation/Diarrhea   [] Nausea/Vomiting   [] None  [] Other:     Plan:   [x] Therapeutic Diet  []  Obtained/adjusted food preferences/tolerances and/or snacks options   []  Supplements added   [] Occupational therapy following for feeding techniques  []  HS snack added   []  Modify diet texture   [] Modify diet for food allergies   []  Educate patient   []  Assist with menu selection   [x]  Monitor PO intake on meal rounds   [x]  Continue inpatient monitoring and intervention   [x]  Participated in discharge planning/Interdisciplinary rounds/Team meetings   []  Other:     Education Needs:   [] Not appropriate for teaching at this time due to:   [x] Identified and addressed    Nutrition Monitoring and Evaluation:  [x] Continue ongoing monitoring and intervention  [] Randa Sampson

## 2020-05-07 NOTE — PROGRESS NOTES
2246: Received patient from [facility] and oriented x4. No signs of distress. Orientate patient to the unit,safety assess. Assessment  Done. Bed low and locked. Call bell within reach. Will continue monitoring. 5325: In bed resting quietly,uneventful night,call bell within reach. Will continue monitoring. 5852: H/H 7.8 and 24.3 respectively, will make MD aware,awaiting to call back. 5836: Andreina March called back ,no new orders received. Patient Vitals for the past 12 hrs:   Temp Pulse Resp BP SpO2   05/07/20 0540 98.6 °F (37 °C) 95 18 159/70 93 %   05/06/20 2255 99.5 °F (37.5 °C) (!) 102 17 149/78 98 %   05/06/20 2036     97 %   05/06/20 2009 98.9 °F (37.2 °C) 88 16 146/64 96 %     0730: Bedside and Verbal shift change report given to Mary Trinidad (oncoming nurse) by Hermilo Burch (offgoing nurse). Report included the following information SBAR, Kardex, MAR and Recent Results.

## 2020-05-07 NOTE — ROUTINE PROCESS
TRANSFER - OUT REPORT: 
 
Verbal report given to VLADIMIR Hobbs(name) on Orlando Cutler  being transferred to 2  (room 2214)(unit) for routine progression of care Report consisted of patients Situation, Background, Assessment and  
Recommendations(SBAR). Information from the following report(s) SBAR, Kardex, Intake/Output and MAR was reviewed with the receiving nurse. Lines:  
Peripheral IV 05/04/20 Left Antecubital (Active) Site Assessment Clean, dry, & intact 5/6/2020  4:00 PM  
Phlebitis Assessment 0 5/6/2020  4:00 PM  
Infiltration Assessment 0 5/6/2020  4:00 PM  
Dressing Status Clean, dry, & intact 5/6/2020  4:00 PM  
Dressing Type Transparent 5/6/2020  4:00 PM  
Hub Color/Line Status Pink;Flushed;Capped 5/6/2020  4:00 PM  
Action Taken Open ports on tubing capped 5/5/2020  4:06 AM  
Alcohol Cap Used Yes 5/5/2020  4:06 AM  
  
 
Opportunity for questions and clarification was provided. Patient transported with: 
 Registered Nurse Tech

## 2020-05-07 NOTE — ROUTINE PROCESS
Patient resulted negative for COVID 19. Droplet- plus isolation precaution discontinued per Dr Micky Cruz.

## 2020-05-07 NOTE — PROGRESS NOTES
completed a follow up visit with and Spiritual assessment of patient and offered Pastoral care support ot patient after her clearing the isolation of the Covid 19 virus. Patient informed me that her advance directive issue has been taken care of by her  bringing in a copy of what they had for her records. Chaplains will continue to follow and will provide pastoral care on an as needed/requested basis    Chaplain Philip Sue   Board Certified [address] [place] [address]   [phone]

## 2020-05-11 NOTE — CDMP QUERY
Patient was admitted with sepsis due to a suspected respiratory infection. Consideration was given for COVID-19 which was ruled out. Pneumonia is listed on the discharge summary as a discharge diagnosis. However, the hospital summary notes, \"Sepsis. Likely due to respiratory infection or COPD exacerbation. Viral infection still is a possibility. CXR chest x-ray with no report of pneumonia. \" Patient was treated with antibiotics and discharged on Levaquin. After study, was this patient suspected to have pneumonia? 
 
=> Pneumonia confirmed 
=> Pneumonia ruled out 
=> Viral respiratory infection only 
=> Other (please specify) 
=> Unable to determine The medical record reflects the following: 
 
Risk Factors: COPD exacerbation Clinical Indicators:  
- per discharge summary: Patient seen by urgent care [facility] on Friday and today.  They did chest x-ray and they told her that she had pneumonia or bronchitis. Rosalia Murray Her respiratory rate and heart rate was high in the ER according to the ER MD and the chest x-ray reported with possible left lower lobe pneumonia with streaks density - CXR (5/4): Mild left basilar atelectasis and/or streaky infiltrate. 
- CXR (5/5): Coarsened interstitial markings. Treatment: given IV Levaquin, given one dose of IV Zithromax. Discharged on PO Levaquin Thank you, [address] [facility], [facility] 
[phone] Note: CAP, HAP, and HCAP indicate where the pneumonia was acquired, not a specific type.

## 2020-05-28 LAB — GLUCOSE BLD STRIP.AUTO-MCNC: 106 MG/DL (ref 70–110)

## 2020-07-06 ENCOUNTER — HOSPITAL ENCOUNTER (OUTPATIENT)
Dept: CT IMAGING | Age: 70
Discharge: HOME | End: 2020-07-06
Payer: MEDICARE

## 2020-07-06 DIAGNOSIS — J98.8 OTHER SPECIFIED RESPIRATORY DISORDERS: ICD-10-CM

## 2020-07-06 DIAGNOSIS — R91.1 SOLITARY PULMONARY NODULE: ICD-10-CM

## 2020-07-06 PROCEDURE — 71250 CT THORAX DX C-: CPT

## 2020-08-14 ENCOUNTER — APPOINTMENT (OUTPATIENT)
Dept: GENERAL RADIOLOGY | Age: 70
DRG: 871 | End: 2020-08-14
Attending: EMERGENCY MEDICINE
Payer: MEDICARE

## 2020-08-14 ENCOUNTER — HOSPITAL ENCOUNTER (INPATIENT)
Age: 70
LOS: 5 days | Discharge: HOME | DRG: 871 | End: 2020-08-19
Attending: EMERGENCY MEDICINE | Admitting: INTERNAL MEDICINE
Payer: MEDICARE

## 2020-08-14 DIAGNOSIS — R65.10 SIRS (SYSTEMIC INFLAMMATORY RESPONSE SYNDROME) (HCC): Primary | ICD-10-CM

## 2020-08-14 DIAGNOSIS — J44.1 COPD WITH ACUTE EXACERBATION (HCC): ICD-10-CM

## 2020-08-14 PROBLEM — M06.9 RHEUMATOID ARTHRITIS (HCC): Status: ACTIVE | Noted: 2020-08-14

## 2020-08-14 LAB
ALBUMIN SERPL-MCNC: 3.2 G/DL (ref 3.4–5)
ALBUMIN/GLOB SERPL: 0.8 {RATIO} (ref 0.8–1.7)
ALP SERPL-CCNC: 70 U/L (ref 45–117)
ALT SERPL-CCNC: 12 U/L (ref 13–56)
ANION GAP SERPL CALC-SCNC: 10 MMOL/L (ref 3–18)
APPEARANCE UR: CLEAR
AST SERPL-CCNC: 13 U/L (ref 10–38)
BACTERIA URNS QL MICRO: ABNORMAL /HPF
BASOPHILS # BLD: 0 K/UL (ref 0–0.1)
BASOPHILS NFR BLD: 0 % (ref 0–2)
BILIRUB SERPL-MCNC: 0.3 MG/DL (ref 0.2–1)
BILIRUB UR QL: NEGATIVE
BNP SERPL-MCNC: 218 PG/ML (ref 0–900)
BUN SERPL-MCNC: 16 MG/DL (ref 7–18)
BUN/CREAT SERPL: 16 (ref 12–20)
CALCIUM SERPL-MCNC: 9.1 MG/DL (ref 8.5–10.1)
CHLORIDE SERPL-SCNC: 101 MMOL/L (ref 100–111)
CK MB CFR SERPL CALC: NORMAL % (ref 0–4)
CK MB SERPL-MCNC: <1 NG/ML (ref 5–25)
CK SERPL-CCNC: 40 U/L (ref 26–192)
CO2 SERPL-SCNC: 23 MMOL/L (ref 21–32)
COLOR UR: YELLOW
CREAT SERPL-MCNC: 1.02 MG/DL (ref 0.6–1.3)
DIFFERENTIAL METHOD BLD: ABNORMAL
EOSINOPHIL # BLD: 0.1 K/UL (ref 0–0.4)
EOSINOPHIL NFR BLD: 0 % (ref 0–5)
EPITH CASTS URNS QL MICRO: ABNORMAL /LPF (ref 0–5)
ERYTHROCYTE [DISTWIDTH] IN BLOOD BY AUTOMATED COUNT: 19 % (ref 11.6–14.5)
GLOBULIN SER CALC-MCNC: 4.2 G/DL (ref 2–4)
GLUCOSE BLD STRIP.AUTO-MCNC: 223 MG/DL (ref 70–110)
GLUCOSE SERPL-MCNC: 115 MG/DL (ref 74–99)
GLUCOSE UR STRIP.AUTO-MCNC: NEGATIVE MG/DL
HCT VFR BLD AUTO: 34.1 % (ref 35–45)
HGB BLD-MCNC: 11 G/DL (ref 12–16)
HGB UR QL STRIP: NEGATIVE
KETONES UR QL STRIP.AUTO: NEGATIVE MG/DL
LACTATE BLD-SCNC: 1.03 MMOL/L (ref 0.4–2)
LACTATE BLD-SCNC: 2.34 MMOL/L (ref 0.4–2)
LEUKOCYTE ESTERASE UR QL STRIP.AUTO: NEGATIVE
LYMPHOCYTES # BLD: 1.9 K/UL (ref 0.9–3.6)
LYMPHOCYTES NFR BLD: 10 % (ref 21–52)
MCH RBC QN AUTO: 28.6 PG (ref 24–34)
MCHC RBC AUTO-ENTMCNC: 32.3 G/DL (ref 31–37)
MCV RBC AUTO: 88.8 FL (ref 74–97)
MONOCYTES # BLD: 2.5 K/UL (ref 0.05–1.2)
MONOCYTES NFR BLD: 13 % (ref 3–10)
NEUTS SEG # BLD: 15 K/UL (ref 1.8–8)
NEUTS SEG NFR BLD: 77 % (ref 40–73)
NITRITE UR QL STRIP.AUTO: NEGATIVE
PH UR STRIP: 5 [PH] (ref 5–8)
PLATELET # BLD AUTO: 570 K/UL (ref 135–420)
PMV BLD AUTO: 9.3 FL (ref 9.2–11.8)
POTASSIUM SERPL-SCNC: 3.9 MMOL/L (ref 3.5–5.5)
PROCALCITONIN SERPL-MCNC: 1.62 NG/ML
PROT SERPL-MCNC: 7.4 G/DL (ref 6.4–8.2)
PROT UR STRIP-MCNC: ABNORMAL MG/DL
RBC # BLD AUTO: 3.84 M/UL (ref 4.2–5.3)
RBC #/AREA URNS HPF: ABNORMAL /HPF (ref 0–5)
SODIUM SERPL-SCNC: 134 MMOL/L (ref 136–145)
SP GR UR REFRACTOMETRY: 1.02 (ref 1–1.03)
TROPONIN I SERPL-MCNC: <0.02 NG/ML (ref 0–0.04)
UROBILINOGEN UR QL STRIP.AUTO: 0.2 EU/DL (ref 0.2–1)
WBC # BLD AUTO: 19.6 K/UL (ref 4.6–13.2)
WBC URNS QL MICRO: ABNORMAL /HPF (ref 0–4)

## 2020-08-14 PROCEDURE — 96368 THER/DIAG CONCURRENT INF: CPT

## 2020-08-14 PROCEDURE — 84145 PROCALCITONIN (PCT): CPT

## 2020-08-14 PROCEDURE — 82962 GLUCOSE BLOOD TEST: CPT

## 2020-08-14 PROCEDURE — 71045 X-RAY EXAM CHEST 1 VIEW: CPT

## 2020-08-14 PROCEDURE — 74011636637 HC RX REV CODE- 636/637: Performed by: INTERNAL MEDICINE

## 2020-08-14 PROCEDURE — 74011000250 HC RX REV CODE- 250: Performed by: EMERGENCY MEDICINE

## 2020-08-14 PROCEDURE — 77030029684 HC NEB SM VOL KT MONA -A

## 2020-08-14 PROCEDURE — 82550 ASSAY OF CK (CPK): CPT

## 2020-08-14 PROCEDURE — 94761 N-INVAS EAR/PLS OXIMETRY MLT: CPT

## 2020-08-14 PROCEDURE — 99285 EMERGENCY DEPT VISIT HI MDM: CPT

## 2020-08-14 PROCEDURE — 74011000258 HC RX REV CODE- 258: Performed by: EMERGENCY MEDICINE

## 2020-08-14 PROCEDURE — 83605 ASSAY OF LACTIC ACID: CPT

## 2020-08-14 PROCEDURE — 65660000000 HC RM CCU STEPDOWN

## 2020-08-14 PROCEDURE — 74011250636 HC RX REV CODE- 250/636: Performed by: INTERNAL MEDICINE

## 2020-08-14 PROCEDURE — 96365 THER/PROPH/DIAG IV INF INIT: CPT

## 2020-08-14 PROCEDURE — 74011250637 HC RX REV CODE- 250/637: Performed by: INTERNAL MEDICINE

## 2020-08-14 PROCEDURE — 94640 AIRWAY INHALATION TREATMENT: CPT

## 2020-08-14 PROCEDURE — 87040 BLOOD CULTURE FOR BACTERIA: CPT

## 2020-08-14 PROCEDURE — 74011250636 HC RX REV CODE- 250/636: Performed by: EMERGENCY MEDICINE

## 2020-08-14 PROCEDURE — 96375 TX/PRO/DX INJ NEW DRUG ADDON: CPT

## 2020-08-14 PROCEDURE — 85025 COMPLETE CBC W/AUTO DIFF WBC: CPT

## 2020-08-14 PROCEDURE — 93005 ELECTROCARDIOGRAM TRACING: CPT

## 2020-08-14 PROCEDURE — 83880 ASSAY OF NATRIURETIC PEPTIDE: CPT

## 2020-08-14 PROCEDURE — 80053 COMPREHEN METABOLIC PANEL: CPT

## 2020-08-14 PROCEDURE — 74011000250 HC RX REV CODE- 250: Performed by: INTERNAL MEDICINE

## 2020-08-14 PROCEDURE — 81001 URINALYSIS AUTO W/SCOPE: CPT

## 2020-08-14 RX ORDER — INSULIN LISPRO 100 [IU]/ML
INJECTION, SOLUTION INTRAVENOUS; SUBCUTANEOUS
Status: DISCONTINUED | OUTPATIENT
Start: 2020-08-14 | End: 2020-08-19 | Stop reason: HOSPADM

## 2020-08-14 RX ORDER — FOLIC ACID 1 MG/1
1 TABLET ORAL DAILY
Status: DISCONTINUED | OUTPATIENT
Start: 2020-08-15 | End: 2020-08-19 | Stop reason: HOSPADM

## 2020-08-14 RX ORDER — CODEINE PHOSPHATE AND GUAIFENESIN 10; 100 MG/5ML; MG/5ML
10 SOLUTION ORAL 3 TIMES DAILY
Status: COMPLETED | OUTPATIENT
Start: 2020-08-14 | End: 2020-08-17

## 2020-08-14 RX ORDER — ONDANSETRON 2 MG/ML
4 INJECTION INTRAMUSCULAR; INTRAVENOUS ONCE
Status: COMPLETED | OUTPATIENT
Start: 2020-08-14 | End: 2020-08-14

## 2020-08-14 RX ORDER — DEXTROSE MONOHYDRATE 100 MG/ML
125-250 INJECTION, SOLUTION INTRAVENOUS AS NEEDED
Status: DISCONTINUED | OUTPATIENT
Start: 2020-08-14 | End: 2020-08-19 | Stop reason: HOSPADM

## 2020-08-14 RX ORDER — ENOXAPARIN SODIUM 100 MG/ML
40 INJECTION SUBCUTANEOUS EVERY 24 HOURS
Status: DISCONTINUED | OUTPATIENT
Start: 2020-08-14 | End: 2020-08-19 | Stop reason: HOSPADM

## 2020-08-14 RX ORDER — FLUOXETINE 10 MG/1
10 CAPSULE ORAL DAILY
Status: DISCONTINUED | OUTPATIENT
Start: 2020-08-15 | End: 2020-08-14

## 2020-08-14 RX ORDER — SODIUM CHLORIDE 0.9 % (FLUSH) 0.9 %
5-40 SYRINGE (ML) INJECTION AS NEEDED
Status: DISCONTINUED | OUTPATIENT
Start: 2020-08-14 | End: 2020-08-19 | Stop reason: HOSPADM

## 2020-08-14 RX ORDER — POTASSIUM CHLORIDE 20 MEQ/1
40 TABLET, EXTENDED RELEASE ORAL 2 TIMES DAILY
Status: DISCONTINUED | OUTPATIENT
Start: 2020-08-14 | End: 2020-08-16

## 2020-08-14 RX ORDER — BUSPIRONE HYDROCHLORIDE 7.5 MG/1
7.5 TABLET ORAL 3 TIMES DAILY
Status: DISCONTINUED | OUTPATIENT
Start: 2020-08-14 | End: 2020-08-19 | Stop reason: HOSPADM

## 2020-08-14 RX ORDER — SODIUM CHLORIDE 0.9 % (FLUSH) 0.9 %
5-40 SYRINGE (ML) INJECTION EVERY 8 HOURS
Status: DISCONTINUED | OUTPATIENT
Start: 2020-08-14 | End: 2020-08-19 | Stop reason: HOSPADM

## 2020-08-14 RX ORDER — IPRATROPIUM BROMIDE AND ALBUTEROL SULFATE 2.5; .5 MG/3ML; MG/3ML
3 SOLUTION RESPIRATORY (INHALATION)
Status: DISCONTINUED | OUTPATIENT
Start: 2020-08-14 | End: 2020-08-19 | Stop reason: HOSPADM

## 2020-08-14 RX ORDER — BUDESONIDE 0.5 MG/2ML
500 INHALANT ORAL
Status: DISCONTINUED | OUTPATIENT
Start: 2020-08-14 | End: 2020-08-19 | Stop reason: HOSPADM

## 2020-08-14 RX ORDER — IPRATROPIUM BROMIDE AND ALBUTEROL SULFATE 2.5; .5 MG/3ML; MG/3ML
3 SOLUTION RESPIRATORY (INHALATION)
Status: COMPLETED | OUTPATIENT
Start: 2020-08-14 | End: 2020-08-14

## 2020-08-14 RX ORDER — INSULIN GLARGINE 100 [IU]/ML
0.2 INJECTION, SOLUTION SUBCUTANEOUS
Status: DISCONTINUED | OUTPATIENT
Start: 2020-08-14 | End: 2020-08-17

## 2020-08-14 RX ORDER — MONTELUKAST SODIUM 10 MG/1
10 TABLET ORAL DAILY
Status: DISCONTINUED | OUTPATIENT
Start: 2020-08-15 | End: 2020-08-19 | Stop reason: HOSPADM

## 2020-08-14 RX ORDER — LANOLIN ALCOHOL/MO/W.PET/CERES
5 CREAM (GRAM) TOPICAL
Status: DISCONTINUED | OUTPATIENT
Start: 2020-08-14 | End: 2020-08-19 | Stop reason: HOSPADM

## 2020-08-14 RX ORDER — MAGNESIUM SULFATE 100 %
4 CRYSTALS MISCELLANEOUS AS NEEDED
Status: DISCONTINUED | OUTPATIENT
Start: 2020-08-14 | End: 2020-08-19 | Stop reason: HOSPADM

## 2020-08-14 RX ORDER — PANTOPRAZOLE SODIUM 20 MG/1
20 TABLET, DELAYED RELEASE ORAL
Status: DISCONTINUED | OUTPATIENT
Start: 2020-08-15 | End: 2020-08-19 | Stop reason: HOSPADM

## 2020-08-14 RX ORDER — ACETAMINOPHEN 500 MG
1000 TABLET ORAL
Status: COMPLETED | OUTPATIENT
Start: 2020-08-14 | End: 2020-08-14

## 2020-08-14 RX ORDER — HYDROCODONE POLISTIREX AND CHLORPHENIRAMINE POLISTIREX 10; 8 MG/5ML; MG/5ML
5 SUSPENSION, EXTENDED RELEASE ORAL
Status: DISCONTINUED | OUTPATIENT
Start: 2020-08-14 | End: 2020-08-19 | Stop reason: HOSPADM

## 2020-08-14 RX ORDER — LORATADINE 10 MG/1
10 TABLET ORAL DAILY
Status: DISCONTINUED | OUTPATIENT
Start: 2020-08-15 | End: 2020-08-19 | Stop reason: HOSPADM

## 2020-08-14 RX ADMIN — SODIUM CHLORIDE 500 ML: 900 INJECTION, SOLUTION INTRAVENOUS at 17:28

## 2020-08-14 RX ADMIN — INSULIN LISPRO 4 UNITS: 100 INJECTION, SOLUTION INTRAVENOUS; SUBCUTANEOUS at 22:39

## 2020-08-14 RX ADMIN — CEFTRIAXONE 1 G: 1 INJECTION, POWDER, FOR SOLUTION INTRAMUSCULAR; INTRAVENOUS at 15:19

## 2020-08-14 RX ADMIN — ACETAMINOPHEN 1000 MG: 500 TABLET, FILM COATED ORAL at 18:08

## 2020-08-14 RX ADMIN — BUSPIRONE HYDROCHLORIDE 7.5 MG: 7.5 TABLET ORAL at 21:24

## 2020-08-14 RX ADMIN — SODIUM CHLORIDE 1000 ML: 900 INJECTION, SOLUTION INTRAVENOUS at 13:32

## 2020-08-14 RX ADMIN — METHYLPREDNISOLONE SODIUM SUCCINATE 60 MG: 125 INJECTION, POWDER, FOR SOLUTION INTRAMUSCULAR; INTRAVENOUS at 20:42

## 2020-08-14 RX ADMIN — AZITHROMYCIN MONOHYDRATE 500 MG: 500 INJECTION, POWDER, LYOPHILIZED, FOR SOLUTION INTRAVENOUS at 20:39

## 2020-08-14 RX ADMIN — IPRATROPIUM BROMIDE AND ALBUTEROL SULFATE 3 ML: .5; 3 SOLUTION RESPIRATORY (INHALATION) at 20:36

## 2020-08-14 RX ADMIN — IPRATROPIUM BROMIDE AND ALBUTEROL SULFATE 3 ML: .5; 3 SOLUTION RESPIRATORY (INHALATION) at 13:48

## 2020-08-14 RX ADMIN — SODIUM CHLORIDE 500 ML: 900 INJECTION, SOLUTION INTRAVENOUS at 13:48

## 2020-08-14 RX ADMIN — BUDESONIDE 500 MCG: 0.5 INHALANT RESPIRATORY (INHALATION) at 20:35

## 2020-08-14 RX ADMIN — DOXYCYCLINE 100 MG: 100 INJECTION, POWDER, LYOPHILIZED, FOR SOLUTION INTRAVENOUS at 15:19

## 2020-08-14 RX ADMIN — ENOXAPARIN SODIUM 40 MG: 40 INJECTION SUBCUTANEOUS at 20:41

## 2020-08-14 RX ADMIN — Medication 10 ML: at 21:26

## 2020-08-14 RX ADMIN — ONDANSETRON 4 MG: 2 INJECTION INTRAMUSCULAR; INTRAVENOUS at 13:48

## 2020-08-14 RX ADMIN — GUAIFENESIN AND CODEINE PHOSPHATE 10 ML: 100; 10 SOLUTION ORAL at 21:25

## 2020-08-14 RX ADMIN — POTASSIUM CHLORIDE 40 MEQ: 1500 TABLET, EXTENDED RELEASE ORAL at 20:42

## 2020-08-14 RX ADMIN — INSULIN GLARGINE 11 UNITS: 100 INJECTION, SOLUTION SUBCUTANEOUS at 22:39

## 2020-08-14 RX ADMIN — MELATONIN 4.5 MG: at 22:43

## 2020-08-14 NOTE — H&P
Internal Medicine History and Physical  Note           NAME:  Cisco Moseley   :   1950   MRN:  [id number]     PCP:  Marlene Torres NP     Date/Time:  2020 4:51 PM      I hereby certify this patient for admission based upon medical necessity as noted below:        Assessment / plan :      #  COPD with acute exacerbation ([facility] (2020) with SIRS. Possible pneumonia, will repeat CXR in 1-2 days and follow clinical progress. Solumedrol then po prednisone. Nebulizer regular and prn . Home regimen . Antibiotic course as ordered . CXR . PCCM as needed. She is on chronic steroid Treatement at home. Blood culture   follow at Dr Bunny Pacheco pulmonary clinic. #  DM (diabetes mellitus) ([facility] (2020). Provide SSI, hypoglycemia protocol and frequent Accu checks. Provide SSI, hypoglycemia protocol and frequent Accu checks. Education,  diabetic educator  . Diabetic Diet        #  Rheumatoid arthritis ([facility] (2020) She is a known case of rheumatoid arthritis treated with Methotrexate. -DVT prophylaxis :  Lovenox. - Code Status : FULL    -Other chronic medical problems as per past history. Further management depend on the course of the case and expanded data base. DISPO - pt to be admitted at this time for reasons addressed above, continued hospitalization for ongoing assessment and treatment indicated        Subjective:     CHIEF COMPLAINT: Cough , SOB, wheezes and chills over last 2 days      HISTORY OF PRESENT ILLNESS:     Ms. Mich Merino is a 69 y.o.  female who is admitted for acute COPD exacerbation , SOB. Ms. Mich Merino with past medical history as noted below , presented to the Emergency Department today  complaining of above. Triage and ER notes noted. For the last 2 days she is experiencing cough , chills , feverish feeling, wheezes and SOB.  Treated for acute bronchitis by her pulmonary clinic 3 weeks ago and seen by them a week ago. She is not on home O2. Poor appetite. No contact with COVID patients. Always use mask and hardly go out in public places. In er her LA was elevated but went down with 1/2 L IVF according to nursing. She is known to me from her previous may admission at this hospital with chest infection too. She is a known case of rheumatoid arthritis treated with Methotrexate. Deny CP apart from part of generalized pains when cough. Past Medical History:   Diagnosis Date    Asthma     Chronic obstructive pulmonary disease ([facility]     Diabetes ([facility]     Heart murmur     Menopause         History reviewed. No pertinent surgical history. Social History     Tobacco Use    Smoking status: Not on file   Substance Use Topics    Alcohol use: Not on file        History reviewed. No pertinent family history. Allergies   Allergen Reactions    Sulfa (Sulfonamide Antibiotics) Diarrhea        Prior to Admission medications    Medication Sig Start Date End Date Taking? Authorizing Provider   potassium chloride (K-DUR, KLOR-CON) 20 mEq tablet Take 2 Tabs by mouth two (2) times a day. 5/7/20   Yesica Platt MD   predniSONE (DELTASONE) 1 mg tablet Take  by mouth daily (with breakfast). Daria Tolentino MD   abatacept (ORENCIA) 250 mg injection by IntraVENous route once. Daria Tolentino MD   methotrexate (RHEUMATREX) 2.5 mg tablet Take  by mouth. 5mg takes on fridays    Daria Tolentino MD   folic acid (FOLVITE) 1 mg tablet Take  by mouth daily. Daria Tolentino MD   zoledronic acid (RECLAST) 5 mg/100 mL pgbk 5 mg by IntraVENous route once. Once per year    Daria Tolentino MD   fluticasone propion-salmeteroL (ADVAIR DISKUS) 250-50 mcg/dose diskus inhaler Take 1 Puff by inhalation every twelve (12) hours. Daria Tolentino MD   montelukast (SINGULAIR) 10 mg tablet Take 10 mg by mouth daily. Daria Tolentino MD   albuterol (PROAIR HFA) 90 mcg/actuation inhaler Take  by inhalation.     Daria Tolentino MD   loratadine (CLARITIN) 10 mg tablet Take 10 mg by mouth. Daria Tolentino MD   FLUoxetine (PROZAC) 20 mg capsule Take  by mouth daily. Daria Tolentino MD   omeprazole (PRILOSEC) 20 mg capsule Take 20 mg by mouth daily. Daria Tolentino MD   metFORMIN (GLUCOPHAGE) 500 mg tablet Take  by mouth two (2) times daily (with meals). Daria Tolentino MD   busPIRone (BUSPAR) 7.5 mg tablet Take 7.5 mg by mouth three (3) times daily. Daria Tolentino MD   melatonin 5 mg tablet Take  by mouth nightly. 10mg prn at night    Daria Tolentino MD       Review of Systems:  (bold if positive, otherwise negative), apart from what mentioned in HPI  Apart from above patient deny any other significant complain  Gen:  Weight gain, weight loss, fever, chills, fatigue. Eyes:  Visual changes, pain, conjunctivitis  ENT:  Sore throat, rhinorrhea, decreased hearing  CVS:  Palpitations, chest pain, dizziness, syncope, edema, PND  Pulm:  Cough, dyspnea,  Wheezing , all her body hurt when cough. GI:  Abdominal pain, nausea, emesis, diarrhea, constipation, GERD, melena  :  Hematuria, incontinence, nocturia, dysuria, discharge  MS:  Pain, weakness, swelling, arthritis  Skin:  Rash, erythema, abscess, wound, moles  Endo:  Heat intolerance, cold intolerance, weight gain, polyuria  Hem:  Enlarged nodes, bruising, bleeding, night sweats  Renal:  Edema, change in urine, flank pain  Neuro:  Numbness, tingling, weakness, seizure, headache, tremors       VITALS:    Vital signs reviewed; most recent are:    Visit Vitals  /54   Pulse (!) 111   Temp 99.4 °F (37.4 °C)   Resp 17   Wt 55.3 kg (122 lb)   SpO2 94%   BMI 22.31 kg/m²     SpO2 Readings from Last 6 Encounters:   08/14/20 94%   05/07/20 97%   02/17/20 91%        No intake or output data in the 24 hours ending 08/14/20 1206     Physical Exam:     Gen: ill looking ,   Appear stated age, Well-developed,   in no acute distress  HEENT:  Head atraumatic, normocephalic , hearing intact to voice, moist mucous membranes.   Neck:  Trachea midline , No apparent JVD, Supple   Resp: bilateral ronchi. No accessory muscle use,Bilateral BS   Card:  No murmurs, normal S1, S2 without Gallop . No Significant lower leg peripheral edema. Abd:  Soft, non-tender, non-distended, bowel sounds are present . Musc:  No cyanosis or clubbing. Skin:  No rashes or ulcers, skin turgor is good. Neuro:  Cranial nerves are grossly intact, no clear area of focal motor weakness, follows commands appropriately. Psych:  Good insight, oriented to person, place and time, alert. Labs: All Cardiac Markers in the last 24 hours:   Lab Results   Component Value Date/Time    CPK 40 08/14/2020 01:15 PM    CKMB <1.0 08/14/2020 01:15 PM    CKND1  08/14/2020 01:15 PM     CALCULATION NOT PERFORMED WHEN RESULT IS BELOW LINEAR LIMIT    TROIQ <0.02 08/14/2020 01:15 PM       Recent Labs     08/14/20  1315   WBC 19.6*   HGB 11.0*   HCT 34.1*   *     Recent Labs     08/14/20  1315   *   K 3.9      CO2 23   *   BUN 16   CREA 1.02   CA 9.1   ALB 3.2*   TBILI 0.3   ALT 12*     Lab Results   Component Value Date/Time    Glucose (POC) 78 05/07/2020 08:36 AM    Glucose (POC) 91 05/06/2020 09:33 PM     No results for input(s): PH, PCO2, PO2, HCO3, FIO2 in the last 72 hours. No results for input(s): INR, INREXT, INREXT in the last 72 hours. Xr Chest Port    Result Date: 8/14/2020  IMPRESSION:  Progression of coarse interstitial opacities within both lungs, right greater than left. Findings may represent progressing pulmonary fibrosis, however correlate clinically to exclude superimposed atypical infection. Please refer to radiology reports.       Telemetry reviewed:   normal sinus rhythm    Risk of deterioration: high      Total time spent in the care of this patient: 79 [address] [facility] discussed with: Patient, Nursing Staff, >50% of time spent in counseling and coordination of care and ER MD      Discussed:  Care Plan       I have personally reviewed all pertinent labs, films and EKGs that have officially resulted. I reviewed available pertaining electronic documentation outlining the initial presentation as well as the emergency room physician's encounter. This document in whole or part of it has been produced using voice recognition software. Unrecognized errors in transcription may be present.     Attending Physician: Owen Espinoza MD

## 2020-08-14 NOTE — PROGRESS NOTES
TRANSFER - IN REPORT:    Verbal report received from Enrique Morgan RN(name) on Sandra Elena  being received from ER(unit) for routine progression of care      Report consisted of patients Situation, Background, Assessment and   Recommendations(SBAR). Information from the following report(s) SBAR, Kardex, Intake/Output, MAR and Recent Results was reviewed with the receiving nurse. Opportunity for questions and clarification was provided. Assessment completed upon patients arrival to unit and care assumed.

## 2020-08-14 NOTE — REMOTE MONITORING
Spoke with primary RN regarding sepsis protocol. Call back number [phone], thank you.   Signed By: Fahad Riggins RN     August 14, 2020      [address] [facility]

## 2020-08-14 NOTE — ED NOTES
Pt ambulated to restroom with slow steady gait. Hacking coughing.  Placed back in closed room on monitor and to IV fluids, awaiting inpt placement at this time

## 2020-08-14 NOTE — ED NOTES
TRANSFER - ED to INPATIENT REPORT:    Verbal report given to Yumiko Jurado on Jenny Moreau  being transferred to 2200(unit) for routine progression of care       Report consisted of patients Situation, Background, Assessment and   Recommendations(SBAR). SBAR report made available to receiving floor on this patient being transferred to 2200  for routine progression of care       Admitting diagnosis COPD with acute exacerbation ([facility] [J44.1]    Information from the following report(s) SBAR, ED Summary, MAR and Recent Results was made available to receiving floor. Lines:   Peripheral IV 08/14/20 Right Antecubital (Active)        HCG Status for ALL Females under 55 y/o: NO     Medication list updated today    Opportunity for questions and clarification was provided.       Patient is oriented to time, place, person and situation   Patient is  continent and ambulatory without assist     Valuables transported with patient     Patient transported with:   Tech    MAP (Monitor): 73 =Monitored (most recent)  Vitals w/ MEWS Score (last day)     Date/Time MEWS Score Pulse Resp Temp BP Level of Consciousness SpO2    08/14/20 1730  2  73  24  (!) 100.5 °F (38.1 °C)  116/66  Alert  92 %    08/14/20 1713              95 %    08/14/20 1630        99.4 °F (37.4 °C)  118/54  Alert  94 %    08/14/20 1600          113/50    93 %    08/14/20 1430          129/55    93 %    08/14/20 1345          141/64    94 %    08/14/20 1330          145/64    90 %    08/14/20 1315          123/79        08/14/20 1253  3  (!) 111  17  99.7 °F (37.6 °C)  150/82  Alert  95 %              Septic Patients:     Lactic Acid  Lab Results   Component Value Date    LACPOC 1.03 08/14/2020    LACPOC 2.34 ([facility]) 08/14/2020    (Most recent on top)  Repeat drawn: YES Time: 1420     ALL LACTIC ACIDS GREATER THAN 2 MUST BE REPEATED POC WITHIN 4 HOURS OR PRIOR TO ADMISSION               Total Fluid Bolus initiated and documented on MAR: YES    All ordered antibiotics initiated within first 3 hours of TIME ZERO?   YES

## 2020-08-14 NOTE — ED PROVIDER NOTES
Patient is a 26-year-old female with a history of rheumatoid arthritis, COPD, diabetes who presents to the emergency department with a chief complaint of shortness of breath, fever, myalgias, nausea and vomiting as well as some diarrhea. She states symptoms really seem to start yesterday, with abrupt onset of chills and fever. She has had frequent coughing as well. Character of the cough is dry. It is frequent. She tried her rescue inhaler earlier today and she states that gave her some benefit. She denies any chest pain but describes a chest tightness, while having coughing fit in the room with me patient had an episode of emesis. Patient denies any abdominal pain, leg swelling or DVT or PE history. Reports that she was admitted in May for sepsis and pneumonia and that she feels similar as she did then. Past Medical History:   Diagnosis Date    Asthma     Chronic obstructive pulmonary disease ([facility]     Diabetes ([facility]     Heart murmur     Menopause        History reviewed. No pertinent surgical history. History reviewed. No pertinent family history.     Social History     Socioeconomic History    Marital status:      Spouse name: Not on file    Number of children: Not on file    Years of education: Not on file    Highest education level: Not on file   Occupational History    Not on file   Social Needs    Financial resource strain: Not on file    Food insecurity     Worry: Not on file     Inability: Not on file    Transportation needs     Medical: Not on file     Non-medical: Not on file   Tobacco Use    Smoking status: Not on file   Substance and Sexual Activity    Alcohol use: Not on file    Drug use: Not on file    Sexual activity: Not on file   Lifestyle    Physical activity     Days per week: Not on file     Minutes per session: Not on file    Stress: Not on file   Relationships    Social connections     Talks on phone: Not on file     Gets together: Not on file Attends Jew service: Not on file     Active member of club or organization: Not on file     Attends meetings of clubs or organizations: Not on file     Relationship status: Not on file    Intimate partner violence     Fear of current or ex partner: Not on file     Emotionally abused: Not on file     Physically abused: Not on file     Forced sexual activity: Not on file   Other Topics Concern    Not on file   Social History Narrative    Not on file         ALLERGIES: Sulfa (sulfonamide antibiotics)    Review of Systems   Constitutional: Positive for fatigue and fever. Negative for chills. HENT: Negative for congestion, rhinorrhea, sinus pressure and sneezing. Eyes: Negative for visual disturbance. Respiratory: Positive for cough and shortness of breath. Cardiovascular: Negative for chest pain and leg swelling. Gastrointestinal: Positive for diarrhea, nausea and vomiting. Negative for abdominal pain. Genitourinary: Negative for dysuria, frequency and urgency. Musculoskeletal: Positive for myalgias. Negative for back pain and neck pain. Skin: Negative for rash. Neurological: Negative for syncope, numbness and headaches. Vitals:    08/14/20 1730 08/14/20 1820 08/14/20 2037 08/14/20 2043   BP: 116/66 152/77  101/52   Pulse: 73 89  96   Resp: 24 24  18   Temp: (!) 100.5 °F (38.1 °C) 99.4 °F (37.4 °C)  97.9 °F (36.6 °C)   SpO2: 92% 90% 94% 94%   Weight:                Physical Exam  Constitutional:       General: She is not in acute distress. Appearance: Normal appearance. She is normal weight. She is not ill-appearing or toxic-appearing. Comments: Uncomfortable appearing, nontoxic not in overt distress. HENT:      Head: Normocephalic and atraumatic. Right Ear: External ear normal.      Left Ear: External ear normal.      Nose: Nose normal. No congestion or rhinorrhea. Mouth/Throat:      Mouth: Mucous membranes are moist.      Pharynx: Oropharynx is clear.  No oropharyngeal exudate or posterior oropharyngeal erythema. Eyes:      Extraocular Movements: Extraocular movements intact. Pupils: Pupils are equal, round, and reactive to light. Neck:      Musculoskeletal: Normal range of motion and neck supple. No muscular tenderness. Cardiovascular:      Rate and Rhythm: Regular rhythm. Tachycardia present. Pulses: Normal pulses. Heart sounds: Normal heart sounds. No murmur. Pulmonary:      Effort: Pulmonary effort is normal. Tachypnea (Mildly tachypneic) present. Breath sounds: Examination of the right-middle field reveals wheezing. Examination of the left-middle field reveals wheezing. Examination of the right-lower field reveals decreased breath sounds. Examination of the left-lower field reveals decreased breath sounds. Decreased breath sounds and wheezing present. No rhonchi or rales. Abdominal:      General: Abdomen is flat. Palpations: Abdomen is soft. Tenderness: There is no abdominal tenderness. There is no guarding or rebound. Musculoskeletal: Normal range of motion. General: No swelling, tenderness or deformity. Right lower leg: She exhibits no tenderness. No edema. Left lower leg: She exhibits no tenderness. No edema. Skin:     General: Skin is warm and dry. Capillary Refill: Capillary refill takes less than 2 seconds. Findings: No rash. Neurological:      General: No focal deficit present. Mental Status: She is alert and oriented to person, place, and time. EKG: EKG interpretation: August 14, 2020, 3054. Sinus tachycardia, ventricular rate of 114 bpm, AK interval normal, QRS normal, QTC normal.  Normal axis. No ST segment elevation. Submillimeter ST segment depression noted in leads II, aVF, V4, V5 and V6. T waves are upright. Comparison EKG in May 2020 morphology is similar without acute changes.     Recent Results (from the past 12 hour(s))   CBC WITH AUTOMATED DIFF    Collection Time: 08/14/20  1:15 PM   Result Value Ref Range    WBC 19.6 (H) 4.6 - 13.2 K/uL    RBC 3.84 (L) 4.20 - 5.30 M/uL    HGB 11.0 (L) 12.0 - 16.0 g/dL    HCT 34.1 (L) 35.0 - 45.0 %    MCV 88.8 74.0 - 97.0 FL    MCH 28.6 24.0 - 34.0 PG    MCHC 32.3 31.0 - 37.0 g/dL    RDW 19.0 (H) 11.6 - 14.5 %    PLATELET 972 (H) 214 - 420 K/uL    MPV 9.3 9.2 - 11.8 FL    NEUTROPHILS 77 (H) 40 - 73 %    LYMPHOCYTES 10 (L) 21 - 52 %    MONOCYTES 13 (H) 3 - 10 %    EOSINOPHILS 0 0 - 5 %    BASOPHILS 0 0 - 2 %    ABS. NEUTROPHILS 15.0 (H) 1.8 - 8.0 K/UL    ABS. LYMPHOCYTES 1.9 0.9 - 3.6 K/UL    ABS. MONOCYTES 2.5 (H) 0.05 - 1.2 K/UL    ABS. EOSINOPHILS 0.1 0.0 - 0.4 K/UL    ABS. BASOPHILS 0.0 0.0 - 0.1 K/UL    DF AUTOMATED     METABOLIC PANEL, COMPREHENSIVE    Collection Time: 08/14/20  1:15 PM   Result Value Ref Range    Sodium 134 (L) 136 - 145 mmol/L    Potassium 3.9 3.5 - 5.5 mmol/L    Chloride 101 100 - 111 mmol/L    CO2 23 21 - 32 mmol/L    Anion gap 10 3.0 - 18 mmol/L    Glucose 115 (H) 74 - 99 mg/dL    BUN 16 7.0 - 18 MG/DL    Creatinine 1.02 0.6 - 1.3 MG/DL    BUN/Creatinine ratio 16 12 - 20      GFR est AA >60 >60 ml/min/1.73m2    GFR est non-AA 53 (L) >60 ml/min/1.73m2    Calcium 9.1 8.5 - 10.1 MG/DL    Bilirubin, total 0.3 0.2 - 1.0 MG/DL    ALT (SGPT) 12 (L) 13 - 56 U/L    AST (SGOT) 13 10 - 38 U/L    Alk.  phosphatase 70 45 - 117 U/L    Protein, total 7.4 6.4 - 8.2 g/dL    Albumin 3.2 (L) 3.4 - 5.0 g/dL    Globulin 4.2 (H) 2.0 - 4.0 g/dL    A-G Ratio 0.8 0.8 - 1.7     PROCALCITONIN    Collection Time: 08/14/20  1:15 PM   Result Value Ref Range    Procalcitonin 1.62 ng/mL   URINALYSIS W/ RFLX MICROSCOPIC    Collection Time: 08/14/20  1:15 PM   Result Value Ref Range    Color YELLOW      Appearance CLEAR      Specific gravity 1.020 1.005 - 1.030      pH (UA) 5.0 5.0 - 8.0      Protein TRACE (A) NEG mg/dL    Glucose Negative NEG mg/dL    Ketone Negative NEG mg/dL    Bilirubin Negative NEG      Blood Negative NEG      Urobilinogen 0.2 0.2 - 1.0 EU/dL    Nitrites Negative NEG      Leukocyte Esterase Negative NEG     NT-PRO BNP    Collection Time: 08/14/20  1:15 PM   Result Value Ref Range    NT pro- 0 - 900 PG/ML   CARDIAC PANEL,(CK, CKMB & TROPONIN)    Collection Time: 08/14/20  1:15 PM   Result Value Ref Range    CK - MB <1.0 <3.6 ng/ml    CK-MB Index  0.0 - 4.0 %     CALCULATION NOT PERFORMED WHEN RESULT IS BELOW LINEAR LIMIT    CK 40 26 - 192 U/L    Troponin-I, QT <0.02 0.0 - 0.045 NG/ML   URINE MICROSCOPIC ONLY    Collection Time: 08/14/20  1:15 PM   Result Value Ref Range    WBC 0 to 2 0 - 4 /hpf    RBC 0 to 1 0 - 5 /hpf    Epithelial cells FEW 0 - 5 /lpf    Bacteria FEW (A) NEG /hpf   POC LACTIC ACID    Collection Time: 08/14/20  1:27 PM   Result Value Ref Range    Lactic Acid (POC) 2.34 (HH) 0.40 - 2.00 mmol/L   EKG, 12 LEAD, INITIAL    Collection Time: 08/14/20  1:54 PM   Result Value Ref Range    Ventricular Rate 114 BPM    Atrial Rate 114 BPM    P-R Interval 126 ms    QRS Duration 72 ms    Q-T Interval 318 ms    QTC Calculation (Bezet) 438 ms    Calculated P Axis 76 degrees    Calculated R Axis 64 degrees    Calculated T Axis 36 degrees    Diagnosis       Sinus tachycardia  Nonspecific ST and T wave abnormality  Abnormal ECG  When compared with ECG of 04-MAY-2020 12:04,  Left posterior fascicular block is no longer present  Nonspecific T wave abnormality now evident in Inferior leads  Nonspecific T wave abnormality, improved in Lateral leads     POC LACTIC ACID    Collection Time: 08/14/20  4:33 PM   Result Value Ref Range    Lactic Acid (POC) 1.03 0.40 - 2.00 mmol/L     XR CHEST PORT   Final Result   IMPRESSION:  Progression of coarse interstitial opacities within both lungs,   right greater than left. Findings may represent progressing pulmonary fibrosis,   however correlate clinically to exclude superimposed atypical infection.           MDM  Number of Diagnoses or Management Options  Diagnosis management comments: Patient 76-year-old female with a history of rheumatoid arthritis, COPD, diabetes who presents with the chief complaint of cough, fever and shortness of breath. Reports symptoms started abruptly yesterday. Differential includes pneumonia, COVID-19, COPD exacerbation, other viral illness, PE, ACS, etc.  On exam patient does appear to have diminished breath sounds with occasional crackles at the bases bilaterally. Is not appear clinically fluid overloaded. I will start the patient with a 1500 cc bolus of IV fluid as she is tachycardic. I will check labs to evaluate for sepsis, temperature is slightly elevated 99.7 but not fever criteria. Pulse rate of 111. Will continue to monitor closely. Patient's chest x-ray shows interstitial prominence, no apparent lobar pneumonia. Her results otherwise show a leukocytosis nearly 20,000. Lactic acid was elevated at 2.3. Patient feeling improved after receiving IV fluids. I gave the patient a total of 2 L of IV fluid to satisfy her 30 cc/kg requirement. Start patient Rocephin and doxy for likely respiratory source for her pneumonia. Patient discussed with hospitalist on-call who agreed with admission. Vital suspect pulmonary embolism given absence of chest pain or significant hypoxia. She does remain tachycardic however she has a much more infectious picture than that of pulmonary embolism or acute coronary syndrome. Patient updated with the plan for mission, she was in agreement, and stable for transfer to the floor. Diagnosis:   1. SIRS  2. Community-acquired pneumonia      Disposition: Admit to medicine    Follow-up Information    None         Current Discharge Medication List      CONTINUE these medications which have NOT CHANGED    Details   potassium chloride (K-DUR, KLOR-CON) 20 mEq tablet Take 2 Tabs by mouth two (2) times a day. Qty: 2 Tab, Refills: 0      predniSONE (DELTASONE) 1 mg tablet Take  by mouth daily (with breakfast).       abatacept (ORENCIA) 250 mg injection by IntraVENous route once. methotrexate (RHEUMATREX) 2.5 mg tablet Take  by mouth. 5mg takes on fridays      folic acid (FOLVITE) 1 mg tablet Take  by mouth daily. zoledronic acid (RECLAST) 5 mg/100 mL pgbk 5 mg by IntraVENous route once. Once per year      fluticasone propion-salmeteroL (ADVAIR DISKUS) 250-50 mcg/dose diskus inhaler Take 1 Puff by inhalation every twelve (12) hours. montelukast (SINGULAIR) 10 mg tablet Take 10 mg by mouth daily. albuterol (PROAIR HFA) 90 mcg/actuation inhaler Take  by inhalation. loratadine (CLARITIN) 10 mg tablet Take 10 mg by mouth. FLUoxetine (PROZAC) 20 mg capsule Take  by mouth daily. omeprazole (PRILOSEC) 20 mg capsule Take 20 mg by mouth daily. metFORMIN (GLUCOPHAGE) 500 mg tablet Take  by mouth two (2) times daily (with meals). busPIRone (BUSPAR) 7.5 mg tablet Take 7.5 mg by mouth three (3) times daily. melatonin 5 mg tablet Take  by mouth nightly.  10mg prn at night

## 2020-08-15 LAB
ANION GAP SERPL CALC-SCNC: 8 MMOL/L (ref 3–18)
BASOPHILS # BLD: 0 K/UL (ref 0–0.1)
BASOPHILS NFR BLD: 0 % (ref 0–2)
BUN SERPL-MCNC: 11 MG/DL (ref 7–18)
BUN/CREAT SERPL: 15 (ref 12–20)
CALCIUM SERPL-MCNC: 8.4 MG/DL (ref 8.5–10.1)
CHLORIDE SERPL-SCNC: 111 MMOL/L (ref 100–111)
CO2 SERPL-SCNC: 21 MMOL/L (ref 21–32)
CREAT SERPL-MCNC: 0.73 MG/DL (ref 0.6–1.3)
DIFFERENTIAL METHOD BLD: ABNORMAL
EOSINOPHIL # BLD: 0 K/UL (ref 0–0.4)
EOSINOPHIL NFR BLD: 0 % (ref 0–5)
ERYTHROCYTE [DISTWIDTH] IN BLOOD BY AUTOMATED COUNT: 19.4 % (ref 11.6–14.5)
EST. AVERAGE GLUCOSE BLD GHB EST-MCNC: 126 MG/DL
GLUCOSE BLD STRIP.AUTO-MCNC: 147 MG/DL (ref 70–110)
GLUCOSE BLD STRIP.AUTO-MCNC: 160 MG/DL (ref 70–110)
GLUCOSE BLD STRIP.AUTO-MCNC: 172 MG/DL (ref 70–110)
GLUCOSE BLD STRIP.AUTO-MCNC: 209 MG/DL (ref 70–110)
GLUCOSE BLD STRIP.AUTO-MCNC: 215 MG/DL (ref 70–110)
GLUCOSE SERPL-MCNC: 164 MG/DL (ref 74–99)
HBA1C MFR BLD: 6 % (ref 4.2–5.6)
HCT VFR BLD AUTO: 30.3 % (ref 35–45)
HGB BLD-MCNC: 9.5 G/DL (ref 12–16)
LYMPHOCYTES # BLD: 0.5 K/UL (ref 0.9–3.6)
LYMPHOCYTES NFR BLD: 4 % (ref 21–52)
MAGNESIUM SERPL-MCNC: 1.4 MG/DL (ref 1.6–2.6)
MCH RBC QN AUTO: 28.1 PG (ref 24–34)
MCHC RBC AUTO-ENTMCNC: 31.4 G/DL (ref 31–37)
MCV RBC AUTO: 89.6 FL (ref 74–97)
MONOCYTES # BLD: 0.3 K/UL (ref 0.05–1.2)
MONOCYTES NFR BLD: 2 % (ref 3–10)
NEUTS SEG # BLD: 13.6 K/UL (ref 1.8–8)
NEUTS SEG NFR BLD: 94 % (ref 40–73)
PHOSPHATE SERPL-MCNC: 2.2 MG/DL (ref 2.5–4.9)
PLATELET # BLD AUTO: 494 K/UL (ref 135–420)
PMV BLD AUTO: 9.3 FL (ref 9.2–11.8)
POTASSIUM SERPL-SCNC: 4.7 MMOL/L (ref 3.5–5.5)
RBC # BLD AUTO: 3.38 M/UL (ref 4.2–5.3)
SODIUM SERPL-SCNC: 140 MMOL/L (ref 136–145)
WBC # BLD AUTO: 14.4 K/UL (ref 4.6–13.2)

## 2020-08-15 PROCEDURE — 94761 N-INVAS EAR/PLS OXIMETRY MLT: CPT

## 2020-08-15 PROCEDURE — 65660000000 HC RM CCU STEPDOWN

## 2020-08-15 PROCEDURE — 80048 BASIC METABOLIC PNL TOTAL CA: CPT

## 2020-08-15 PROCEDURE — 74011636637 HC RX REV CODE- 636/637: Performed by: INTERNAL MEDICINE

## 2020-08-15 PROCEDURE — 77010033678 HC OXYGEN DAILY

## 2020-08-15 PROCEDURE — 94640 AIRWAY INHALATION TREATMENT: CPT

## 2020-08-15 PROCEDURE — 84100 ASSAY OF PHOSPHORUS: CPT

## 2020-08-15 PROCEDURE — 74011000250 HC RX REV CODE- 250: Performed by: INTERNAL MEDICINE

## 2020-08-15 PROCEDURE — 74011250637 HC RX REV CODE- 250/637: Performed by: INTERNAL MEDICINE

## 2020-08-15 PROCEDURE — 82962 GLUCOSE BLOOD TEST: CPT

## 2020-08-15 PROCEDURE — 83735 ASSAY OF MAGNESIUM: CPT

## 2020-08-15 PROCEDURE — 36415 COLL VENOUS BLD VENIPUNCTURE: CPT

## 2020-08-15 PROCEDURE — 83036 HEMOGLOBIN GLYCOSYLATED A1C: CPT

## 2020-08-15 PROCEDURE — 74011000258 HC RX REV CODE- 258: Performed by: INTERNAL MEDICINE

## 2020-08-15 PROCEDURE — 74011250636 HC RX REV CODE- 250/636: Performed by: INTERNAL MEDICINE

## 2020-08-15 PROCEDURE — 85025 COMPLETE CBC W/AUTO DIFF WBC: CPT

## 2020-08-15 RX ORDER — ONDANSETRON 2 MG/ML
4 INJECTION INTRAMUSCULAR; INTRAVENOUS
Status: DISCONTINUED | OUTPATIENT
Start: 2020-08-15 | End: 2020-08-19 | Stop reason: HOSPADM

## 2020-08-15 RX ORDER — ACETAMINOPHEN 325 MG/1
650 TABLET ORAL
Status: DISCONTINUED | OUTPATIENT
Start: 2020-08-15 | End: 2020-08-19 | Stop reason: HOSPADM

## 2020-08-15 RX ORDER — MAGNESIUM SULFATE HEPTAHYDRATE 40 MG/ML
2 INJECTION, SOLUTION INTRAVENOUS ONCE
Status: COMPLETED | OUTPATIENT
Start: 2020-08-15 | End: 2020-08-15

## 2020-08-15 RX ADMIN — Medication 10 ML: at 06:21

## 2020-08-15 RX ADMIN — IPRATROPIUM BROMIDE AND ALBUTEROL SULFATE 3 ML: .5; 3 SOLUTION RESPIRATORY (INHALATION) at 08:20

## 2020-08-15 RX ADMIN — FOLIC ACID 1 MG: 1 TABLET ORAL at 10:05

## 2020-08-15 RX ADMIN — IPRATROPIUM BROMIDE AND ALBUTEROL SULFATE 3 ML: .5; 3 SOLUTION RESPIRATORY (INHALATION) at 20:19

## 2020-08-15 RX ADMIN — METHYLPREDNISOLONE SODIUM SUCCINATE 60 MG: 125 INJECTION, POWDER, FOR SOLUTION INTRAMUSCULAR; INTRAVENOUS at 23:37

## 2020-08-15 RX ADMIN — BUDESONIDE 500 MCG: 0.5 INHALANT RESPIRATORY (INHALATION) at 20:19

## 2020-08-15 RX ADMIN — BUSPIRONE HYDROCHLORIDE 7.5 MG: 7.5 TABLET ORAL at 10:04

## 2020-08-15 RX ADMIN — METHYLPREDNISOLONE SODIUM SUCCINATE 60 MG: 125 INJECTION, POWDER, FOR SOLUTION INTRAMUSCULAR; INTRAVENOUS at 06:20

## 2020-08-15 RX ADMIN — INSULIN LISPRO 4 UNITS: 100 INJECTION, SOLUTION INTRAVENOUS; SUBCUTANEOUS at 21:50

## 2020-08-15 RX ADMIN — BUDESONIDE 500 MCG: 0.5 INHALANT RESPIRATORY (INHALATION) at 08:20

## 2020-08-15 RX ADMIN — POTASSIUM CHLORIDE 40 MEQ: 1500 TABLET, EXTENDED RELEASE ORAL at 17:49

## 2020-08-15 RX ADMIN — IPRATROPIUM BROMIDE AND ALBUTEROL SULFATE 3 ML: .5; 3 SOLUTION RESPIRATORY (INHALATION) at 15:35

## 2020-08-15 RX ADMIN — AZITHROMYCIN MONOHYDRATE 250 MG: 500 INJECTION, POWDER, LYOPHILIZED, FOR SOLUTION INTRAVENOUS at 20:45

## 2020-08-15 RX ADMIN — GUAIFENESIN AND CODEINE PHOSPHATE 10 ML: 100; 10 SOLUTION ORAL at 10:04

## 2020-08-15 RX ADMIN — Medication 10 ML: at 17:55

## 2020-08-15 RX ADMIN — ACETAMINOPHEN 650 MG: 325 TABLET, FILM COATED ORAL at 10:54

## 2020-08-15 RX ADMIN — METHYLPREDNISOLONE SODIUM SUCCINATE 60 MG: 125 INJECTION, POWDER, FOR SOLUTION INTRAMUSCULAR; INTRAVENOUS at 00:29

## 2020-08-15 RX ADMIN — GUAIFENESIN AND CODEINE PHOSPHATE 10 ML: 100; 10 SOLUTION ORAL at 21:52

## 2020-08-15 RX ADMIN — GUAIFENESIN AND CODEINE PHOSPHATE 10 ML: 100; 10 SOLUTION ORAL at 17:48

## 2020-08-15 RX ADMIN — METHYLPREDNISOLONE SODIUM SUCCINATE 60 MG: 125 INJECTION, POWDER, FOR SOLUTION INTRAMUSCULAR; INTRAVENOUS at 12:51

## 2020-08-15 RX ADMIN — ENOXAPARIN SODIUM 40 MG: 40 INJECTION SUBCUTANEOUS at 20:45

## 2020-08-15 RX ADMIN — MONTELUKAST 10 MG: 10 TABLET, FILM COATED ORAL at 10:04

## 2020-08-15 RX ADMIN — INSULIN GLARGINE 11 UNITS: 100 INJECTION, SOLUTION SUBCUTANEOUS at 21:51

## 2020-08-15 RX ADMIN — MELATONIN 4.5 MG: at 21:51

## 2020-08-15 RX ADMIN — INSULIN LISPRO 4 UNITS: 100 INJECTION, SOLUTION INTRAVENOUS; SUBCUTANEOUS at 17:50

## 2020-08-15 RX ADMIN — POTASSIUM CHLORIDE 40 MEQ: 1500 TABLET, EXTENDED RELEASE ORAL at 10:04

## 2020-08-15 RX ADMIN — BUSPIRONE HYDROCHLORIDE 7.5 MG: 7.5 TABLET ORAL at 17:50

## 2020-08-15 RX ADMIN — Medication 10 ML: at 21:53

## 2020-08-15 RX ADMIN — IPRATROPIUM BROMIDE AND ALBUTEROL SULFATE 3 ML: .5; 3 SOLUTION RESPIRATORY (INHALATION) at 11:55

## 2020-08-15 RX ADMIN — INSULIN LISPRO 2 UNITS: 100 INJECTION, SOLUTION INTRAVENOUS; SUBCUTANEOUS at 08:56

## 2020-08-15 RX ADMIN — Medication 10 ML: at 12:52

## 2020-08-15 RX ADMIN — INSULIN LISPRO 2 UNITS: 100 INJECTION, SOLUTION INTRAVENOUS; SUBCUTANEOUS at 11:35

## 2020-08-15 RX ADMIN — BUSPIRONE HYDROCHLORIDE 7.5 MG: 7.5 TABLET ORAL at 21:52

## 2020-08-15 RX ADMIN — METHYLPREDNISOLONE SODIUM SUCCINATE 60 MG: 125 INJECTION, POWDER, FOR SOLUTION INTRAMUSCULAR; INTRAVENOUS at 17:50

## 2020-08-15 RX ADMIN — PANTOPRAZOLE SODIUM 20 MG: 20 TABLET, DELAYED RELEASE ORAL at 10:04

## 2020-08-15 RX ADMIN — CEFTRIAXONE 1 G: 1 INJECTION, POWDER, FOR SOLUTION INTRAMUSCULAR; INTRAVENOUS at 17:49

## 2020-08-15 RX ADMIN — LORATADINE 10 MG: 10 TABLET ORAL at 10:04

## 2020-08-15 RX ADMIN — MAGNESIUM SULFATE IN WATER 2 G: 40 INJECTION, SOLUTION INTRAVENOUS at 10:04

## 2020-08-15 NOTE — PROGRESS NOTES
Problem: Falls - Risk of  Goal: *Absence of Falls  Description: Document Hieu Mcmanus Fall Risk and appropriate interventions in the flowsheet.   Outcome: Progressing Towards Goal  Note: Fall Risk Interventions:  Mobility Interventions: Patient to call before getting OOB         Medication Interventions: Patient to call before getting OOB

## 2020-08-15 NOTE — PROGRESS NOTES
Problem: Chronic Obstructive Pulmonary Disease (COPD)  Goal: *Oxygen saturation during activity within specified parameters  Outcome: Progressing Towards Goal   Respiratory Therapy Assessment Care Plan    Patient: Jenny Moreau 69 y.o. female 8/15/2020 9:46 AM    COPD with acute exacerbation [facility]) [J44.1]      Chest X-RAY:   Results from Hospital Encounter encounter on 08/14/20   XR CHEST PORT    Impression IMPRESSION:  Progression of coarse interstitial opacities within both lungs,  right greater than left. Findings may represent progressing pulmonary fibrosis,  however correlate clinically to exclude superimposed atypical infection. Results from [place] encounter on 05/04/20   XR CHEST PORT    Impression IMPRESSION:    Coarsened interstitial markings. XR CHEST PORT    Impression IMPRESSION:    Mild left basilar atelectasis and/or streaky infiltrate.           Vital Signs:   Visit Vitals  /78   Pulse 83   Temp 97.9 °F (36.6 °C)   Resp 18   Wt 55.3 kg (122 lb)   SpO2 96%   BMI 22.31 kg/m²         Indications for treatment: Hx: COPD      Plan of care: Duoneb Qid/ Oxygen Therapy        Goal:Improved work of breathing/ Titrate FIO2

## 2020-08-15 NOTE — PROGRESS NOTES
1930 Bedside, Verbal and Written shift change report given to Bibi Mcgregor RN (oncoming nurse) by Malcolm Estrada (offgoing nurse). Report included the following information SBAR, Kardex, Intake/Output, MAR and Recent Results. Patient I alert and oriented x4, clear speech. Tele box #53, ST. Dyspnea at rest and with exertion. O2 sat 94%. 2130 PM medications administered, pt tolerated with ease, will continue to monitor. 0000 Shift reassessment, pt condition unchanged, will continue to monitor. 0400 Shift reassessment, pt sleeping between care, will continue to monitor. 0700 Bedside, Verbal and Written shift change report given to Doctor Lalito Alas (oncoming nurse) by Bibi Mcgregor RN (offgoing nurse). Report included the following information SBAR, Kardex, Intake/Output, MAR and Recent Results. Skin assessment completed.

## 2020-08-15 NOTE — PROGRESS NOTES
Problem: Discharge Planning  Goal: *Discharge to safe environment  Outcome: Resolved/Met   Plan home    Reason for Admission:                     RUR Score:   21%               PCP: First and Last name:  Malachi Turner   Name of Practice:    Are you a current patient: Yes/No: yes   Approximate date of last visit: June 2020   Can you participate in a virtual visit if needed: yes    Do you (patient/family) have any concerns for transition/discharge?    none               Plan for utilizing home health:  possible     Current Advanced Directive/Advance Care Plan: On file, agent named is spouse            Transition of Care Plan:    Spoke with pt,lives with spouse. Independent with adls and amb  No DME, no O2  Demographics correct  Spouse to transport home. Cm to follow for hh needs and home O2 needs. Patient has designated ________spouse________________ to participate in his/her discharge plan and to receive any needed information. Name: Kalie Anton  Address:  Phone number: [phone]    Care Management Interventions  PCP Verified by CM: Yes  Palliative Care Criteria Met (RRAT>21 & CHF Dx)?: No  Mode of Transport at Discharge: Other (see comment)  Transition of Care Consult (CM Consult):  Other  Discharge Durable Medical Equipment: No  Physical Therapy Consult: No  Occupational Therapy Consult: No  Speech Therapy Consult: No  Current Support Network: Lives with Spouse  Confirm Follow Up Transport: Family  The Patient and/or Patient Representative was Provided with a Choice of Provider and Agrees with the Discharge Plan?: No  Freedom of Choice List was Provided with Basic Dialogue that Supports the Patient's Individualized Plan of Care/Goals, Treatment Preferences and Shares the Quality Data Associated with the Providers?: No  Discharge Location  Discharge Placement: Home

## 2020-08-15 NOTE — PROGRESS NOTES
Internal Medicine Progress Note        NAME: Jaime Roger   :  1950  MRM:  [id number]    Date/Time: 8/15/2020        ASSESSMENT/PLAN:      #  COPD with acute exacerbation ([facility] (2020) with SIRS. Possible pneumonia, repeat CXR in 1-2 days and follow clinical progress. Solumedrol then po prednisone. Nebulizer regular and prn . Home regimen . Antibiotic course as ordered . CXR . PCCM as needed. She is on chronic steroid Treatement at home. Blood culture  She  follows at [facility] pulmonary clinic.      #  DM (diabetes mellitus) ([facility] (2020). Provide SSI, hypoglycemia protocol and frequent Accu checks. Provide SSI, hypoglycemia protocol and frequent Accu checks. Education,  diabetic educator  . Diabetic Diet          #  Rheumatoid arthritis ([facility] (2020) She is a known case of rheumatoid arthritis treated with Methotrexate.      -DVT prophylaxis :  Lovenox. - Code Status : FULL. IP CONSULT TO HOSPITALIST    Lab Review:     Recent Labs     08/15/20  0345 08/14/20  1315   WBC 14.4* 19.6*   HGB 9.5* 11.0*   HCT 30.3* 34.1*   * 570*     Recent Labs     08/15/20  0345 20  1315    134*   K 4.7 3.9    101   CO2 21 23   * 115*   BUN 11 16   CREA 0.73 1.02   CA 8.4* 9.1   MG 1.4*  --    PHOS 2.2*  --    ALB  --  3.2*   TBILI  --  0.3   ALT  --  12*     Lab Results   Component Value Date/Time    Glucose (POC) 160 (H) 08/15/2020 07:08 AM    Glucose (POC) 147 (H) 08/15/2020 12:27 AM    Glucose (POC) 223 (H) 2020 10:23 PM    Glucose (POC) 78 2020 08:36 AM    Glucose (POC) 91 2020 09:33 PM     No results for input(s): PH, PCO2, PO2, HCO3, FIO2 in the last 72 hours. No results for input(s): INR, INREXT in the last 72 hours.     No results found for: SDES  Lab Results   Component Value Date/Time    Culture result: NO GROWTH AFTER 16 HOURS 2020 01:30 PM    Culture result: NO GROWTH AFTER 16 HOURS 2020 01:15 PM    Culture result: NO GROWTH 6 DAYS 05/04/2020 11:00 AM       All Cardiac Markers in the last 24 hours:   Lab Results   Component Value Date/Time    CPK 40 08/14/2020 01:15 PM    CKMB <1.0 08/14/2020 01:15 PM    CKND1  08/14/2020 01:15 PM     CALCULATION NOT PERFORMED WHEN RESULT IS BELOW LINEAR LIMIT    Velora Loosen <0.02 08/14/2020 01:15 PM           Intervals noted   Pt s/e @ bedside     Subjective:     Chief Complaint:        Breathing better  Still SOB and cough though some better than yesterday   Cough better , was able to sleep    ROS:  (bold if positive,otherwise negative)    Fever/chills ,  Dysuria   Cough , Sputum , SOB/PEDRO , Chest Pain     Diarrhea ,Nausea/Vomit , Abd Pain , Constipation    Tolerating Diet                Objective:     Vitals:  Last 24hrs VS reviewed since prior progress note. Most recent are:    Visit Vitals  /78   Pulse 83   Temp 97.9 °F (36.6 °C)   Resp 18   Wt 55.3 kg (122 lb)   SpO2 96%   BMI 22.31 kg/m²     SpO2 Readings from Last 6 Encounters:   08/15/20 96%   05/07/20 97%   02/17/20 91%    O2 Flow Rate (L/min): 2 l/min       Intake/Output Summary (Last 24 hours) at 8/15/2020 1004  Last data filed at 8/15/2020 0000  Gross per 24 hour   Intake 360 ml   Output    Net 360 ml          Physical Exam:   Gen: ill looking ,   Appear stated age, Well-developed,   in no acute distress  HEENT:  Head atraumatic, normocephalic , hearing intact to voice, moist mucous membranes. Neck:   Trachea midline , No apparent JVD, Supple   Resp: CTA, no  ronchi. No accessory muscle use,Bilateral BS   Card:  No murmurs, normal S1, S2 without Gallop . No Significant lower leg peripheral edema. Abd:  Soft, non-tender, non-distended, bowel sounds are present . Musc:  No cyanosis or clubbing. Skin:  No rashes or ulcers, skin turgor is good. Neuro:  Cranial nerves are grossly intact, no clear area of focal motor weakness, follows commands appropriately.   Psych:  Good insight, oriented to person, place and time, alert.    Medications Reviewed: (see below)    Lab Data Reviewed: (see below)    ______________________________________________________________________    Medications:     Current Facility-Administered Medications   Medication Dose Route Frequency    magnesium sulfate 2 g/50 ml IVPB (premix or compounded)  2 g IntraVENous ONCE    albuterol-ipratropium (DUO-NEB) 2.5 MG-0.5 MG/3 ML  3 mL Nebulization QID RT    busPIRone (BUSPAR) tablet 7.5 mg  7.5 mg Oral TID    budesonide (PULMICORT) 500 mcg/2 ml nebulizer suspension  500 mcg Nebulization BID RT    folic acid (FOLVITE) tablet 1 mg  1 mg Oral DAILY    loratadine (CLARITIN) tablet 10 mg  10 mg Oral DAILY    melatonin tablet 4.5 mg  4.5 mg Oral QHS    montelukast (SINGULAIR) tablet 10 mg  10 mg Oral DAILY    pantoprazole (PROTONIX) tablet 20 mg  20 mg Oral ACB    potassium chloride (K-DUR, KLOR-CON) SR tablet 40 mEq  40 mEq Oral BID    sodium chloride (NS) flush 5-40 mL  5-40 mL IntraVENous Q8H    sodium chloride (NS) flush 5-40 mL  5-40 mL IntraVENous PRN    methylPREDNISolone (PF) (Solu-MEDROL) injection 60 mg  60 mg IntraVENous Q6H    cefTRIAXone (ROCEPHIN) 1 g in 0.9% sodium chloride (MBP/ADV) 50 mL MBP  1 g IntraVENous Q24H    enoxaparin (LOVENOX) injection 40 mg  40 mg SubCUTAneous Q24H    chlorpheniramine-HYDROcodone (TUSSIONEX) oral suspension 5 mL  5 mL Oral Q12H PRN    guaiFENesin-codeine (ROBITUSSIN AC) 100-10 mg/5 mL solution 10 mL  10 mL Oral TID    insulin glargine (LANTUS) injection 11 Units  0.2 Units/kg SubCUTAneous QHS    insulin lispro (HUMALOG) injection   SubCUTAneous AC&HS    glucose chewable tablet 16 g  4 Tab Oral PRN    glucagon (GLUCAGEN) injection 1 mg  1 mg IntraMUSCular PRN    dextrose 10% infusion 125-250 mL  125-250 mL IntraVENous PRN    azithromycin (ZITHROMAX) 250 mg in 0.9% sodium chloride 250 mL IVPB  250 mg IntraVENous Q24H          Total time spent with patient: 35 minutes                  Care Plan discussed with: Patient, Nursing Staff and >50% of time spent in counseling and coordination of care    Discussed:  Care Plan    Disposition:  Home w/Family           This document in whole or part of it has been produced using voice recognition software. Unrecognized errors in transcription may be present.     Attending Physician: Michael Costa MD

## 2020-08-15 NOTE — PROGRESS NOTES
Rec'd in bed report from [facility] BORA LOPES RN   Patient awake and alert had some c/o nausea that are resolving also c/o HANLEY  MD in to see patient new orders for tylenols and Zofran . Dry nonproductive cough   No adverse reaction ABT therapy. No further c/o HA or Nausea this shift. New orders for Mag 2gm IV administered per MD orders  Bedside and Verbal shift change report given to James (oncoming nurse) by Man Rangel ** (offgoing nurse). Report included the following information SBAR, Kardex, MAR, Recent Results, Cardiac Rhythm SR and Quality Measures.

## 2020-08-15 NOTE — PROGRESS NOTES
Problem: Falls - Risk of  Goal: *Absence of Falls  Description: Document Wilfrid Bowie Fall Risk and appropriate interventions in the flowsheet. Outcome: Progressing Towards Goal  Note: Fall Risk Interventions:  Mobility Interventions: Patient to call before getting OOB         Medication Interventions: Patient to call before getting OOB    Elimination Interventions: Call light in reach, Toileting schedule/hourly rounds              Problem: Patient Education: Go to Patient Education Activity  Goal: Patient/Family Education  Outcome: Progressing Towards Goal     Problem: Pressure Injury - Risk of  Goal: *Prevention of pressure injury  Description: Document Moi Scale and appropriate interventions in the flowsheet. Outcome: Progressing Towards Goal  Note: Pressure Injury Interventions:             Activity Interventions: Pressure redistribution bed/mattress(bed type)    Mobility Interventions: Pressure redistribution bed/mattress (bed type)    Nutrition Interventions: Document food/fluid/supplement intake    Friction and Shear Interventions: HOB 30 degrees or less                Problem: Patient Education: Go to Patient Education Activity  Goal: Patient/Family Education  Outcome: Progressing Towards Goal     Problem: Pain  Goal: *Control of Pain  Outcome: Progressing Towards Goal     Problem: Patient Education: Go to Patient Education Activity  Goal: Patient/Family Education  Outcome: Progressing Towards Goal     Problem: Chronic Obstructive Pulmonary Disease (COPD)  Goal: *Oxygen saturation during activity within specified parameters  Outcome: Progressing Towards Goal  Goal: *Able to remain out of bed as prescribed  Outcome: Progressing Towards Goal  Goal: *Absence of hypoxia  Outcome: Progressing Towards Goal  Goal: *Optimize nutritional status  Outcome: Progressing Towards Goal     Problem: Patient Education: Go to Patient Education Activity  Goal: Patient/Family Education  Outcome: Progressing Towards Goal

## 2020-08-16 LAB
ANION GAP SERPL CALC-SCNC: 5 MMOL/L (ref 3–18)
ATRIAL RATE: 114 BPM
BASOPHILS # BLD: 0 K/UL (ref 0–0.1)
BASOPHILS NFR BLD: 0 % (ref 0–2)
BUN SERPL-MCNC: 12 MG/DL (ref 7–18)
BUN/CREAT SERPL: 21 (ref 12–20)
CALCIUM SERPL-MCNC: 8.8 MG/DL (ref 8.5–10.1)
CALCULATED P AXIS, ECG09: 76 DEGREES
CALCULATED R AXIS, ECG10: 64 DEGREES
CALCULATED T AXIS, ECG11: 36 DEGREES
CHLORIDE SERPL-SCNC: 114 MMOL/L (ref 100–111)
CO2 SERPL-SCNC: 22 MMOL/L (ref 21–32)
CREAT SERPL-MCNC: 0.56 MG/DL (ref 0.6–1.3)
DIAGNOSIS, 93000: NORMAL
DIFFERENTIAL METHOD BLD: ABNORMAL
EOSINOPHIL # BLD: 0 K/UL (ref 0–0.4)
EOSINOPHIL NFR BLD: 0 % (ref 0–5)
ERYTHROCYTE [DISTWIDTH] IN BLOOD BY AUTOMATED COUNT: 19.6 % (ref 11.6–14.5)
GLUCOSE BLD STRIP.AUTO-MCNC: 111 MG/DL (ref 70–110)
GLUCOSE BLD STRIP.AUTO-MCNC: 162 MG/DL (ref 70–110)
GLUCOSE BLD STRIP.AUTO-MCNC: 176 MG/DL (ref 70–110)
GLUCOSE BLD STRIP.AUTO-MCNC: 186 MG/DL (ref 70–110)
GLUCOSE SERPL-MCNC: 139 MG/DL (ref 74–99)
HCT VFR BLD AUTO: 29.2 % (ref 35–45)
HGB BLD-MCNC: 9.2 G/DL (ref 12–16)
LYMPHOCYTES # BLD: 1 K/UL (ref 0.9–3.6)
LYMPHOCYTES NFR BLD: 6 % (ref 21–52)
MAGNESIUM SERPL-MCNC: 2.6 MG/DL (ref 1.6–2.6)
MCH RBC QN AUTO: 28.4 PG (ref 24–34)
MCHC RBC AUTO-ENTMCNC: 31.5 G/DL (ref 31–37)
MCV RBC AUTO: 90.1 FL (ref 74–97)
MONOCYTES # BLD: 0.9 K/UL (ref 0.05–1.2)
MONOCYTES NFR BLD: 5 % (ref 3–10)
NEUTS SEG # BLD: 16.5 K/UL (ref 1.8–8)
NEUTS SEG NFR BLD: 89 % (ref 40–73)
P-R INTERVAL, ECG05: 126 MS
PHOSPHATE SERPL-MCNC: 2.5 MG/DL (ref 2.5–4.9)
PLATELET # BLD AUTO: 525 K/UL (ref 135–420)
PMV BLD AUTO: 9.3 FL (ref 9.2–11.8)
POTASSIUM SERPL-SCNC: 5.2 MMOL/L (ref 3.5–5.5)
POTASSIUM SERPL-SCNC: 5.7 MMOL/L (ref 3.5–5.5)
Q-T INTERVAL, ECG07: 318 MS
QRS DURATION, ECG06: 72 MS
QTC CALCULATION (BEZET), ECG08: 438 MS
RBC # BLD AUTO: 3.24 M/UL (ref 4.2–5.3)
SODIUM SERPL-SCNC: 141 MMOL/L (ref 136–145)
VENTRICULAR RATE, ECG03: 114 BPM
WBC # BLD AUTO: 18.4 K/UL (ref 4.6–13.2)

## 2020-08-16 PROCEDURE — 36415 COLL VENOUS BLD VENIPUNCTURE: CPT

## 2020-08-16 PROCEDURE — 84132 ASSAY OF SERUM POTASSIUM: CPT

## 2020-08-16 PROCEDURE — 74011636637 HC RX REV CODE- 636/637: Performed by: INTERNAL MEDICINE

## 2020-08-16 PROCEDURE — 85025 COMPLETE CBC W/AUTO DIFF WBC: CPT

## 2020-08-16 PROCEDURE — 83735 ASSAY OF MAGNESIUM: CPT

## 2020-08-16 PROCEDURE — 65660000000 HC RM CCU STEPDOWN

## 2020-08-16 PROCEDURE — 74011000258 HC RX REV CODE- 258: Performed by: INTERNAL MEDICINE

## 2020-08-16 PROCEDURE — 82962 GLUCOSE BLOOD TEST: CPT

## 2020-08-16 PROCEDURE — 74011250637 HC RX REV CODE- 250/637: Performed by: INTERNAL MEDICINE

## 2020-08-16 PROCEDURE — 74011250636 HC RX REV CODE- 250/636: Performed by: INTERNAL MEDICINE

## 2020-08-16 PROCEDURE — 94640 AIRWAY INHALATION TREATMENT: CPT

## 2020-08-16 PROCEDURE — 74011000250 HC RX REV CODE- 250: Performed by: INTERNAL MEDICINE

## 2020-08-16 PROCEDURE — 84100 ASSAY OF PHOSPHORUS: CPT

## 2020-08-16 PROCEDURE — 77010033678 HC OXYGEN DAILY

## 2020-08-16 RX ADMIN — AZITHROMYCIN MONOHYDRATE 250 MG: 500 INJECTION, POWDER, LYOPHILIZED, FOR SOLUTION INTRAVENOUS at 23:24

## 2020-08-16 RX ADMIN — BUSPIRONE HYDROCHLORIDE 7.5 MG: 7.5 TABLET ORAL at 23:00

## 2020-08-16 RX ADMIN — FOLIC ACID 1 MG: 1 TABLET ORAL at 09:26

## 2020-08-16 RX ADMIN — Medication 10 ML: at 17:20

## 2020-08-16 RX ADMIN — GUAIFENESIN AND CODEINE PHOSPHATE 10 ML: 100; 10 SOLUTION ORAL at 17:19

## 2020-08-16 RX ADMIN — MELATONIN 4.5 MG: at 23:00

## 2020-08-16 RX ADMIN — INSULIN LISPRO 2 UNITS: 100 INJECTION, SOLUTION INTRAVENOUS; SUBCUTANEOUS at 09:26

## 2020-08-16 RX ADMIN — GUAIFENESIN AND CODEINE PHOSPHATE 10 ML: 100; 10 SOLUTION ORAL at 09:25

## 2020-08-16 RX ADMIN — BUSPIRONE HYDROCHLORIDE 7.5 MG: 7.5 TABLET ORAL at 09:26

## 2020-08-16 RX ADMIN — Medication 10 ML: at 05:35

## 2020-08-16 RX ADMIN — PANTOPRAZOLE SODIUM 20 MG: 20 TABLET, DELAYED RELEASE ORAL at 09:26

## 2020-08-16 RX ADMIN — CEFTRIAXONE 1 G: 1 INJECTION, POWDER, FOR SOLUTION INTRAMUSCULAR; INTRAVENOUS at 17:19

## 2020-08-16 RX ADMIN — ENOXAPARIN SODIUM 40 MG: 40 INJECTION SUBCUTANEOUS at 22:59

## 2020-08-16 RX ADMIN — Medication 10 ML: at 23:45

## 2020-08-16 RX ADMIN — INSULIN LISPRO 2 UNITS: 100 INJECTION, SOLUTION INTRAVENOUS; SUBCUTANEOUS at 23:41

## 2020-08-16 RX ADMIN — LORATADINE 10 MG: 10 TABLET ORAL at 09:26

## 2020-08-16 RX ADMIN — BUDESONIDE 500 MCG: 0.5 INHALANT RESPIRATORY (INHALATION) at 20:54

## 2020-08-16 RX ADMIN — BUDESONIDE 500 MCG: 0.5 INHALANT RESPIRATORY (INHALATION) at 09:25

## 2020-08-16 RX ADMIN — IPRATROPIUM BROMIDE AND ALBUTEROL SULFATE 3 ML: .5; 3 SOLUTION RESPIRATORY (INHALATION) at 12:21

## 2020-08-16 RX ADMIN — IPRATROPIUM BROMIDE AND ALBUTEROL SULFATE 3 ML: .5; 3 SOLUTION RESPIRATORY (INHALATION) at 20:54

## 2020-08-16 RX ADMIN — METHYLPREDNISOLONE SODIUM SUCCINATE 60 MG: 125 INJECTION, POWDER, FOR SOLUTION INTRAMUSCULAR; INTRAVENOUS at 12:21

## 2020-08-16 RX ADMIN — METHYLPREDNISOLONE SODIUM SUCCINATE 60 MG: 125 INJECTION, POWDER, FOR SOLUTION INTRAMUSCULAR; INTRAVENOUS at 17:19

## 2020-08-16 RX ADMIN — ACETAMINOPHEN 650 MG: 325 TABLET, FILM COATED ORAL at 17:19

## 2020-08-16 RX ADMIN — MONTELUKAST 10 MG: 10 TABLET, FILM COATED ORAL at 09:25

## 2020-08-16 RX ADMIN — GUAIFENESIN AND CODEINE PHOSPHATE 10 ML: 100; 10 SOLUTION ORAL at 23:00

## 2020-08-16 RX ADMIN — IPRATROPIUM BROMIDE AND ALBUTEROL SULFATE 3 ML: .5; 3 SOLUTION RESPIRATORY (INHALATION) at 17:19

## 2020-08-16 RX ADMIN — INSULIN GLARGINE 11 UNITS: 100 INJECTION, SOLUTION SUBCUTANEOUS at 23:42

## 2020-08-16 RX ADMIN — IPRATROPIUM BROMIDE AND ALBUTEROL SULFATE 3 ML: .5; 3 SOLUTION RESPIRATORY (INHALATION) at 09:25

## 2020-08-16 RX ADMIN — BUSPIRONE HYDROCHLORIDE 7.5 MG: 7.5 TABLET ORAL at 17:19

## 2020-08-16 RX ADMIN — METHYLPREDNISOLONE SODIUM SUCCINATE 60 MG: 125 INJECTION, POWDER, FOR SOLUTION INTRAMUSCULAR; INTRAVENOUS at 05:36

## 2020-08-16 RX ADMIN — METHYLPREDNISOLONE SODIUM SUCCINATE 60 MG: 125 INJECTION, POWDER, FOR SOLUTION INTRAMUSCULAR; INTRAVENOUS at 23:24

## 2020-08-16 RX ADMIN — INSULIN LISPRO 2 UNITS: 100 INJECTION, SOLUTION INTRAVENOUS; SUBCUTANEOUS at 17:19

## 2020-08-16 NOTE — PROGRESS NOTES
Bedside report received from [address]: pt in bed, watching tv, AOX4, denies pain, on 2L O2 nc, non labored breathing, shift assessment completed, bed locked and low position, call bell within reach     2045: Scheduled Zithromax 250 mg IV administered, infusing well    2153: Due Medication given, , Humalog 4 units sc given per protocol     2350: sleeping, no distress noted, no change from previous assessment    0126: pt awake, voice no complain    0346: V/S checked, voiced no complain, no change from previous assessment    0536: Solumedrol 60 mg IV given as scheduled    Bedside and Verbal shift change report given to Karen St RN (oncoming nurse) by Prieto Garcia RN (offgoing nurse). Report included the following information SBAR, Kardex, Intake/Output, MAR, Recent Results and Cardiac Rhythm SR on tele # [id number].

## 2020-08-16 NOTE — PROGRESS NOTES
Problem: Falls - Risk of  Goal: *Absence of Falls  Description: Document Eber Lezama Fall Risk and appropriate interventions in the flowsheet. Outcome: Progressing Towards Goal  Note: Fall Risk Interventions:  Mobility Interventions: Patient to call before getting OOB         Medication Interventions: Evaluate medications/consider consulting pharmacy, Patient to call before getting OOB, Teach patient to arise slowly    Elimination Interventions: Toileting schedule/hourly rounds, Patient to call for help with toileting needs, Call light in reach              Problem: Patient Education: Go to Patient Education Activity  Goal: Patient/Family Education  Outcome: Progressing Towards Goal     Problem: Pressure Injury - Risk of  Goal: *Prevention of pressure injury  Description: Document Moi Scale and appropriate interventions in the flowsheet.   Outcome: Progressing Towards Goal  Note: Pressure Injury Interventions:  Sensory Interventions: Check visual cues for pain, Minimize linen layers    Moisture Interventions: Apply protective barrier, creams and emollients    Activity Interventions: Pressure redistribution bed/mattress(bed type)    Mobility Interventions: HOB 30 degrees or less, Pressure redistribution bed/mattress (bed type)    Nutrition Interventions: Document food/fluid/supplement intake    Friction and Shear Interventions: HOB 30 degrees or less                Problem: Patient Education: Go to Patient Education Activity  Goal: Patient/Family Education  Outcome: Progressing Towards Goal     Problem: Pain  Goal: *Control of Pain  Outcome: Progressing Towards Goal     Problem: Patient Education: Go to Patient Education Activity  Goal: Patient/Family Education  Outcome: Progressing Towards Goal     Problem: Chronic Obstructive Pulmonary Disease (COPD)  Goal: *Oxygen saturation during activity within specified parameters  Outcome: Progressing Towards Goal  Goal: *Able to remain out of bed as prescribed  Outcome: Progressing Towards Goal  Goal: *Absence of hypoxia  Outcome: Progressing Towards Goal  Goal: *Optimize nutritional status  Outcome: Progressing Towards Goal     Problem: Patient Education: Go to Patient Education Activity  Goal: Patient/Family Education  Outcome: Progressing Towards Goal

## 2020-08-16 NOTE — PROGRESS NOTES
Internal Medicine Progress Note        NAME: Adriano Samuel   :  1950  MRM:  [id number]    Date/Time: 2020        ASSESSMENT/PLAN: may DC in am       #  COPD with acute exacerbation ([facility] (2020) with SIRS. Possible pneumonia, repeat CXR in am   Iv Solumedrol . Nebulizer regular and prn . Home regimen . Antibiotic course as ordered . She is on chronic steroid Treatement at home. Blood culture  She  follows at Dr [facility] pulmonary clinic. Still hypoxic , down to 88 on movement , will re evaluate in am.   Repeat CXR in am       #  DM (diabetes mellitus) ([facility] (2020). Provide SSI, hypoglycemia protocol and frequent Accu checks. Provide SSI, hypoglycemia protocol and frequent Accu checks. Education,  diabetic educator  . Diabetic Diet       # hyperkalemia. Dc K supplements     #  Rheumatoid arthritis ([facility] (2020) She is a known case of rheumatoid arthritis treated with Methotrexate. # Hyperkalemia. Dc replacement. Trend level      -DVT prophylaxis :  Lovenox. - Code Status : FULL. IP CONSULT TO HOSPITALIST    Lab Review:     Recent Labs     20  0245 08/15/20  0345 20  1315   WBC 18.4* 14.4* 19.6*   HGB 9.2* 9.5* 11.0*   HCT 29.2* 30.3* 34.1*   * 494* 570*     Recent Labs     20  0245 08/15/20  0345 20  1315    140 134*   K 5.7* 4.7 3.9   * 111 101   CO2 22 21 23   * 164* 115*   BUN 12 11 16   CREA 0.56* 0.73 1.02   CA 8.8 8.4* 9.1   MG 2.6 1.4*  --    PHOS 2.5 2.2*  --    ALB  --   --  3.2*   TBILI  --   --  0.3   ALT  --   --  12*     Lab Results   Component Value Date/Time    Glucose (POC) 162 (H) 2020 07:16 AM    Glucose (POC) 215 (H) 08/15/2020 09:44 PM    Glucose (POC) 209 (H) 08/15/2020 04:27 PM    Glucose (POC) 172 (H) 08/15/2020 12:15 PM    Glucose (POC) 160 (H) 08/15/2020 07:08 AM     No results for input(s): PH, PCO2, PO2, HCO3, FIO2 in the last 72 hours.   No results for input(s): INR, INREXT, INREXT in the last 72 hours. No results found for: SDES  Lab Results   Component Value Date/Time    Culture result: NO GROWTH 2 DAYS 08/14/2020 01:30 PM    Culture result: NO GROWTH 2 DAYS 08/14/2020 01:15 PM    Culture result: NO GROWTH 6 DAYS 05/04/2020 11:00 AM       All Cardiac Markers in the last 24 hours:   No results found for: CPK, CK, CKMMB, CKMB, RCK3, CKMBT, CKNDX, CKND1, MARTIN, TROPT, TROIQ, JO, TROPT, TNIPOC, BNP, BNPP        Intervals noted   Pt s/e @ bedside     Subjective:     Chief Complaint:      Improved but still with cough and SOB  No pains    ROS:  No CP/ N/V/D/Pains/Bleeding/ acute joint swelling/rash/itching/fever/chills. Tolerating Diet                Objective:     Vitals:  Last 24hrs VS reviewed since prior progress note. Most recent are:    Visit Vitals  /80 (BP 1 Location: Left arm, BP Patient Position: At rest)   Pulse 89   Temp 97.8 °F (36.6 °C)   Resp 16   Wt 55.3 kg (122 lb)   SpO2 95%   BMI 22.31 kg/m²     SpO2 Readings from Last 6 Encounters:   08/16/20 95%   05/07/20 97%   02/17/20 91%    O2 Flow Rate (L/min): 2 l/min       Intake/Output Summary (Last 24 hours) at 8/16/2020 0946  Last data filed at 8/16/2020 0846  Gross per 24 hour   Intake 1210 ml   Output 860 ml   Net 350 ml          Physical Exam:   Gen:    Appear stated age, Well-developed,   in no acute distress  HEENT:  Head atraumatic, normocephalic , hearing intact to voice, moist mucous membranes. Neck:   Trachea midline , No apparent JVD, Supple   Resp: CTA, no  ronchi. No accessory muscle use,Bilateral BS   Card:  No murmurs, normal S1, S2 without Gallop . No Significant lower leg peripheral edema. Abd:  Soft, non-tender, non-distended, bowel sounds are present . Musc:  No cyanosis or clubbing. Skin:  No rashes or ulcers, skin turgor is good. Neuro:  Cranial nerves are grossly intact, no clear area of focal motor weakness, follows commands appropriately.   Psych:  Good insight, oriented to person, place and time, alert.    Medications Reviewed: (see below)    Lab Data Reviewed: (see below)    ______________________________________________________________________    Medications:     Current Facility-Administered Medications   Medication Dose Route Frequency    ondansetron (ZOFRAN) injection 4 mg  4 mg IntraVENous Q4H PRN    acetaminophen (TYLENOL) tablet 650 mg  650 mg Oral Q6H PRN    albuterol-ipratropium (DUO-NEB) 2.5 MG-0.5 MG/3 ML  3 mL Nebulization QID RT    busPIRone (BUSPAR) tablet 7.5 mg  7.5 mg Oral TID    budesonide (PULMICORT) 500 mcg/2 ml nebulizer suspension  500 mcg Nebulization BID RT    folic acid (FOLVITE) tablet 1 mg  1 mg Oral DAILY    loratadine (CLARITIN) tablet 10 mg  10 mg Oral DAILY    melatonin tablet 4.5 mg  4.5 mg Oral QHS    montelukast (SINGULAIR) tablet 10 mg  10 mg Oral DAILY    pantoprazole (PROTONIX) tablet 20 mg  20 mg Oral ACB    sodium chloride (NS) flush 5-40 mL  5-40 mL IntraVENous Q8H    sodium chloride (NS) flush 5-40 mL  5-40 mL IntraVENous PRN    methylPREDNISolone (PF) (Solu-MEDROL) injection 60 mg  60 mg IntraVENous Q6H    cefTRIAXone (ROCEPHIN) 1 g in 0.9% sodium chloride (MBP/ADV) 50 mL MBP  1 g IntraVENous Q24H    enoxaparin (LOVENOX) injection 40 mg  40 mg SubCUTAneous Q24H    chlorpheniramine-HYDROcodone (TUSSIONEX) oral suspension 5 mL  5 mL Oral Q12H PRN    guaiFENesin-codeine (ROBITUSSIN AC) 100-10 mg/5 mL solution 10 mL  10 mL Oral TID    insulin glargine (LANTUS) injection 11 Units  0.2 Units/kg SubCUTAneous QHS    insulin lispro (HUMALOG) injection   SubCUTAneous AC&HS    glucose chewable tablet 16 g  4 Tab Oral PRN    glucagon (GLUCAGEN) injection 1 mg  1 mg IntraMUSCular PRN    dextrose 10% infusion 125-250 mL  125-250 mL IntraVENous PRN    azithromycin (ZITHROMAX) 250 mg in 0.9% sodium chloride 250 mL IVPB  250 mg IntraVENous Q24H          Total time spent with patient: 35 minutes                  Care Plan discussed with: Patient, Care Manager, Nursing Staff and >50% of time spent in counseling and coordination of care    Discussed:  Care Plan    Disposition:  Home w/Family           This document in whole or part of it has been produced using voice recognition software. Unrecognized errors in transcription may be present.     Attending Physician: Pedro Ospina MD

## 2020-08-16 NOTE — PROGRESS NOTES
Rec'd report from [facility]   Patient is awake alert and oriented able to make needs known. K+ level 5.7 kdur held this am. MD d/c kdtamika   1054 am  6min walk test   Removed O2 \93 at rest   Ambulating 2mins 90% room air   4mins 90% room air   6 mins 89 % room air   Rest after ambulating 88 room air   Recked 1106 am  91% room air   Placed back O2 per MD orders   Patient remains O2 @2l  94%  New order potassium level . Results  5.2  Bedside and Verbal shift change report given to Becka Hernandez (oncoming nurse) by Elmer Frazier ** (offgoing nurse). Report included the following information SBAR, Kardex, MAR, Recent Results, Cardiac Rhythm SR and Quality Measures.

## 2020-08-16 NOTE — PROGRESS NOTES
Problem: Falls - Risk of  Goal: *Absence of Falls  Description: Document Reji Oseas Fall Risk and appropriate interventions in the flowsheet. Outcome: Progressing Towards Goal  Note: Fall Risk Interventions:  Mobility Interventions: Patient to call before getting OOB         Medication Interventions: Evaluate medications/consider consulting pharmacy, Patient to call before getting OOB, Teach patient to arise slowly    Elimination Interventions: Toileting schedule/hourly rounds, Patient to call for help with toileting needs, Call light in reach              Problem: Patient Education: Go to Patient Education Activity  Goal: Patient/Family Education  Outcome: Progressing Towards Goal     Problem: Pressure Injury - Risk of  Goal: *Prevention of pressure injury  Description: Document Moi Scale and appropriate interventions in the flowsheet.   Outcome: Progressing Towards Goal  Note: Pressure Injury Interventions:  Sensory Interventions: Check visual cues for pain, Minimize linen layers         Activity Interventions: Pressure redistribution bed/mattress(bed type)    Mobility Interventions: HOB 30 degrees or less, Pressure redistribution bed/mattress (bed type)    Nutrition Interventions: Offer support with meals,snacks and hydration    Friction and Shear Interventions: Minimize layers, HOB 30 degrees or less                Problem: Patient Education: Go to Patient Education Activity  Goal: Patient/Family Education  Outcome: Progressing Towards Goal     Problem: Pain  Goal: *Control of Pain  Outcome: Progressing Towards Goal     Problem: Patient Education: Go to Patient Education Activity  Goal: Patient/Family Education  Outcome: Progressing Towards Goal     Problem: Chronic Obstructive Pulmonary Disease (COPD)  Goal: *Oxygen saturation during activity within specified parameters  Outcome: Progressing Towards Goal  Goal: *Able to remain out of bed as prescribed  Outcome: Progressing Towards Goal  Goal: *Absence of hypoxia  Outcome: Progressing Towards Goal  Goal: *Optimize nutritional status  Outcome: Progressing Towards Goal     Problem: Patient Education: Go to Patient Education Activity  Goal: Patient/Family Education  Outcome: Progressing Towards Goal

## 2020-08-17 ENCOUNTER — APPOINTMENT (OUTPATIENT)
Dept: GENERAL RADIOLOGY | Age: 70
DRG: 871 | End: 2020-08-17
Attending: INTERNAL MEDICINE
Payer: MEDICARE

## 2020-08-17 LAB
GLUCOSE BLD STRIP.AUTO-MCNC: 142 MG/DL (ref 70–110)
GLUCOSE BLD STRIP.AUTO-MCNC: 145 MG/DL (ref 70–110)
GLUCOSE BLD STRIP.AUTO-MCNC: 165 MG/DL (ref 70–110)
GLUCOSE BLD STRIP.AUTO-MCNC: 182 MG/DL (ref 70–110)

## 2020-08-17 PROCEDURE — 94761 N-INVAS EAR/PLS OXIMETRY MLT: CPT

## 2020-08-17 PROCEDURE — 65660000000 HC RM CCU STEPDOWN

## 2020-08-17 PROCEDURE — 74011636637 HC RX REV CODE- 636/637: Performed by: INTERNAL MEDICINE

## 2020-08-17 PROCEDURE — 74011636637 HC RX REV CODE- 636/637: Performed by: HOSPITALIST

## 2020-08-17 PROCEDURE — 77010033678 HC OXYGEN DAILY

## 2020-08-17 PROCEDURE — 74011000258 HC RX REV CODE- 258: Performed by: INTERNAL MEDICINE

## 2020-08-17 PROCEDURE — 74011000250 HC RX REV CODE- 250: Performed by: INTERNAL MEDICINE

## 2020-08-17 PROCEDURE — 82962 GLUCOSE BLOOD TEST: CPT

## 2020-08-17 PROCEDURE — 94640 AIRWAY INHALATION TREATMENT: CPT

## 2020-08-17 PROCEDURE — 74011250636 HC RX REV CODE- 250/636: Performed by: INTERNAL MEDICINE

## 2020-08-17 PROCEDURE — 71046 X-RAY EXAM CHEST 2 VIEWS: CPT

## 2020-08-17 PROCEDURE — 74011250637 HC RX REV CODE- 250/637: Performed by: INTERNAL MEDICINE

## 2020-08-17 RX ORDER — INSULIN GLARGINE 100 [IU]/ML
13 INJECTION, SOLUTION SUBCUTANEOUS
Status: DISCONTINUED | OUTPATIENT
Start: 2020-08-17 | End: 2020-08-19 | Stop reason: HOSPADM

## 2020-08-17 RX ADMIN — PANTOPRAZOLE SODIUM 20 MG: 20 TABLET, DELAYED RELEASE ORAL at 09:45

## 2020-08-17 RX ADMIN — INSULIN LISPRO 2 UNITS: 100 INJECTION, SOLUTION INTRAVENOUS; SUBCUTANEOUS at 17:01

## 2020-08-17 RX ADMIN — ACETAMINOPHEN 650 MG: 325 TABLET, FILM COATED ORAL at 15:29

## 2020-08-17 RX ADMIN — CEFTRIAXONE 1 G: 1 INJECTION, POWDER, FOR SOLUTION INTRAMUSCULAR; INTRAVENOUS at 15:30

## 2020-08-17 RX ADMIN — MELATONIN 4.5 MG: at 22:49

## 2020-08-17 RX ADMIN — INSULIN GLARGINE 13 UNITS: 100 INJECTION, SOLUTION SUBCUTANEOUS at 22:50

## 2020-08-17 RX ADMIN — BUDESONIDE 500 MCG: 0.5 INHALANT RESPIRATORY (INHALATION) at 08:10

## 2020-08-17 RX ADMIN — BUDESONIDE 500 MCG: 0.5 INHALANT RESPIRATORY (INHALATION) at 19:58

## 2020-08-17 RX ADMIN — BUSPIRONE HYDROCHLORIDE 7.5 MG: 7.5 TABLET ORAL at 09:45

## 2020-08-17 RX ADMIN — LORATADINE 10 MG: 10 TABLET ORAL at 09:45

## 2020-08-17 RX ADMIN — FOLIC ACID 1 MG: 1 TABLET ORAL at 09:45

## 2020-08-17 RX ADMIN — Medication 10 ML: at 05:52

## 2020-08-17 RX ADMIN — MONTELUKAST 10 MG: 10 TABLET, FILM COATED ORAL at 09:45

## 2020-08-17 RX ADMIN — IPRATROPIUM BROMIDE AND ALBUTEROL SULFATE 3 ML: .5; 3 SOLUTION RESPIRATORY (INHALATION) at 08:10

## 2020-08-17 RX ADMIN — IPRATROPIUM BROMIDE AND ALBUTEROL SULFATE 3 ML: .5; 3 SOLUTION RESPIRATORY (INHALATION) at 19:58

## 2020-08-17 RX ADMIN — METHYLPREDNISOLONE SODIUM SUCCINATE 60 MG: 125 INJECTION, POWDER, FOR SOLUTION INTRAMUSCULAR; INTRAVENOUS at 12:42

## 2020-08-17 RX ADMIN — BUSPIRONE HYDROCHLORIDE 7.5 MG: 7.5 TABLET ORAL at 17:01

## 2020-08-17 RX ADMIN — BUSPIRONE HYDROCHLORIDE 7.5 MG: 7.5 TABLET ORAL at 22:49

## 2020-08-17 RX ADMIN — Medication 10 ML: at 22:49

## 2020-08-17 RX ADMIN — ONDANSETRON 4 MG: 2 INJECTION INTRAMUSCULAR; INTRAVENOUS at 03:25

## 2020-08-17 RX ADMIN — METHYLPREDNISOLONE SODIUM SUCCINATE 60 MG: 125 INJECTION, POWDER, FOR SOLUTION INTRAMUSCULAR; INTRAVENOUS at 05:51

## 2020-08-17 RX ADMIN — METHYLPREDNISOLONE SODIUM SUCCINATE 60 MG: 125 INJECTION, POWDER, FOR SOLUTION INTRAMUSCULAR; INTRAVENOUS at 17:32

## 2020-08-17 RX ADMIN — GUAIFENESIN AND CODEINE PHOSPHATE 10 ML: 100; 10 SOLUTION ORAL at 09:45

## 2020-08-17 RX ADMIN — GUAIFENESIN AND CODEINE PHOSPHATE 10 ML: 100; 10 SOLUTION ORAL at 17:01

## 2020-08-17 RX ADMIN — INSULIN LISPRO 2 UNITS: 100 INJECTION, SOLUTION INTRAVENOUS; SUBCUTANEOUS at 09:45

## 2020-08-17 RX ADMIN — AZITHROMYCIN MONOHYDRATE 250 MG: 500 INJECTION, POWDER, LYOPHILIZED, FOR SOLUTION INTRAVENOUS at 20:52

## 2020-08-17 NOTE — PROGRESS NOTES
conducted an initial consultation and Spiritual Assessment for Pramod Baumann, who is a 69 y.o.,female. Patients Primary Language is: [place]. According to the patients EMR Congregational Affiliation is: Bola Collado. The reason the Patient came to the hospital is:   Patient Active Problem List    Diagnosis Date Noted    COPD with acute exacerbation ([facility] 08/14/2020    Rheumatoid arthritis ([facility] 08/14/2020    Chronic obstructive pulmonary disease with acute exacerbation ([facility] 05/04/2020    DM (diabetes mellitus) ([facility] 05/04/2020    Dehydration 05/04/2020    Hypomagnesemia 05/04/2020    Elevated WBC count 05/04/2020    Pneumonia 02/16/2020    Immunosuppressed status ([facility] 02/15/2020    Sepsis ([facility] 02/15/2020        The  provided the following Interventions:  Initiated a relationship of care and support. Explored issues of summer, spirituality and/or Nondenominational needs while hospitalized. Listened empathically. Provided chaplaincy education. Provided information about Spiritual Care Services. Offered prayer and assurance of continued prayers on patient's behalf. Chart reviewed. The following outcomes were achieved:  Patient shared some information about their medical narrative and spiritual journey/beliefs. Patient processed feeling about current hospitalization. Patient expressed gratitude for the 's visit. Assessment:  Patient did not indicate any spiritual or Nondenominational issues which require Spiritual Care Services interventions at this time. Patient does not have any Nondenominational/cultural needs that will affect patients preferences in health care. Plan:  Chaplains will continue to follow and will provide pastoral care on an as needed or requested basis.  recommends bedside caregivers page  on duty if patient shows signs of acute spiritual or emotional distress.     [facility]   Staff [address] [facility]   [phone]

## 2020-08-17 NOTE — PROGRESS NOTES
Problem: Chronic Obstructive Pulmonary Disease (COPD)  Goal: *Oxygen saturation during activity within specified parameters  Outcome: Progressing Towards Goal  Goal: *Able to remain out of bed as prescribed  Outcome: Progressing Towards Goal  Goal: *Absence of hypoxia  Outcome: Progressing Towards Goal     Problem: Gas Exchange - Impaired  Goal: *Absence of hypoxia  Outcome: Progressing Towards Goal   Respiratory Therapy Assessment Care Plan    Patient: Sandra Elena 69 y.o. female 8/17/2020 5:53 PM    COPD with acute exacerbation [facility]) [J44.1]      Chest X-RAY:   Results from Hospital Encounter encounter on 08/14/20   XR CHEST PA LAT    Impression IMPRESSION:    Diffuse interstitial opacities with subtle right peripheral opacities. XR CHEST PORT    Impression IMPRESSION:  Progression of coarse interstitial opacities within both lungs,  right greater than left. Findings may represent progressing pulmonary fibrosis,  however correlate clinically to exclude superimposed atypical infection. Results from [place] encounter on 05/04/20   XR CHEST PORT    Impression IMPRESSION:    Coarsened interstitial markings.            Vital Signs:   Visit Vitals  /77   Pulse 84   Temp 98.1 °F (36.7 °C)   Resp 20   Ht 5' 2\" (1.575 m)   Wt 50.7 kg (111 lb 11.2 oz)   SpO2 99%   BMI 20.43 kg/m²         Indications for treatment: COPD exacerbation      Plan of care: O2 therapy, DuoNeb QID, Pulmicort BID        Goal: Oxygenation, wean O2 as tolerated, improve SOB, COPD maintenance

## 2020-08-17 NOTE — PROGRESS NOTES
Bedside shift change report given to Lb Justice RN (oncoming nurse) by Lenny Vásquez RN (offgoing nurse). Report included the following information SBAR, Kardex, Intake/Output and MAR.

## 2020-08-17 NOTE — PROGRESS NOTES
8/17/2020  Patient with walk test needing o2 at [place]. Patient continues with meds for infection ( rocephin ) and steroids ( not new for pt) Being treated for Acute Exacerbation. Unsure if will qualify for insurance payment. CM to try for it tomorrow. [place].  AILIN Heck  [facility]  [phone], Pager [phone]  [email or web address]

## 2020-08-17 NOTE — PROGRESS NOTES
Progress Note      Patient: Sandra Elena               Sex: female          DOA: 8/14/2020       YOB: 1950      Age:  69 y.o.        LOS:  LOS: 3 days             CHIEF COMPLAINT:  COPD exacerbation    Subjective:     Patient feels okay  She had desaturation yesterday    Objective:      Visit Vitals  /56   Pulse 82   Temp 98.3 °F (36.8 °C)   Resp 16   Ht 5' 2\" (1.575 m)   Wt 55.3 kg (122 lb)   SpO2 92%   BMI 22.31 kg/m²       Physical Exam:  Gen:  No distress, no complaint  Lungs:  Clear bilaterally, no wheeze or rhonchi  Heart:  Regular rate and rhythm, no murmurs or gallops  Abdomen:  Soft, non-tender, normal bowel sounds        Lab/Data Reviewed:  BMP:   Lab Results   Component Value Date/Time    K 5.2 08/16/2020 04:41 PM           Assessment/Plan     Principal Problem:    COPD with acute exacerbation ([address] (8/14/2020)    Active Problems:    DM (diabetes mellitus) ([facility] (5/4/2020)      Rheumatoid arthritis ([facility] (8/14/2020)        Plan:   Mobilize today  Discussed with patient and nurse, repeat walk test  BS control

## 2020-08-17 NOTE — PROGRESS NOTES
8/16/2020 19:45- report received from previous nurse, Macario Whitehead, [address] [place] [address].  20:15-Assessment completed- see flow sheet. NO c/o pain/discomfort. Call light in reach. Continue to monitor. 8/17/2020 00:10- No change in condition. Continue to monitor. Call light in reach. 03:30- Med with zofran 4 mg IV for c/o nausea. 04:20- No c/o nausea at this time. Continue to monitor. Call light in reach. No c/o pain at this time. Resting quietly- call light in reach. 08:10- Report given to oncomiing nurse, Farrah Purcell RN.

## 2020-08-17 NOTE — PROGRESS NOTES
Problem: Falls - Risk of  Goal: *Absence of Falls  Description: Document Isacc Mejia Fall Risk and appropriate interventions in the flowsheet. Outcome: Progressing Towards Goal  Note: Fall Risk Interventions:  Mobility Interventions: Patient to call before getting OOB         Medication Interventions: Teach patient to arise slowly    Elimination Interventions: Toileting schedule/hourly rounds              Problem: Patient Education: Go to Patient Education Activity  Goal: Patient/Family Education  Outcome: Progressing Towards Goal     Problem: Pressure Injury - Risk of  Goal: *Prevention of pressure injury  Description: Document Moi Scale and appropriate interventions in the flowsheet.   Outcome: Progressing Towards Goal  Note: Pressure Injury Interventions:  Sensory Interventions: Assess changes in LOC    Moisture Interventions: Absorbent underpads    Activity Interventions: Increase time out of bed    Mobility Interventions: HOB 30 degrees or less    Nutrition Interventions: Document food/fluid/supplement intake, Discuss nutritional consult with provider, Offer support with meals,snacks and hydration    Friction and Shear Interventions: HOB 30 degrees or less                Problem: Patient Education: Go to Patient Education Activity  Goal: Patient/Family Education  Outcome: Progressing Towards Goal     Problem: Pain  Goal: *Control of Pain  Outcome: Progressing Towards Goal     Problem: Patient Education: Go to Patient Education Activity  Goal: Patient/Family Education  Outcome: Progressing Towards Goal     Problem: Chronic Obstructive Pulmonary Disease (COPD)  Goal: *Oxygen saturation during activity within specified parameters  Outcome: Progressing Towards Goal  Goal: *Able to remain out of bed as prescribed  Outcome: Progressing Towards Goal  Goal: *Absence of hypoxia  Outcome: Progressing Towards Goal  Goal: *Optimize nutritional status  Outcome: Progressing Towards Goal     Problem: Patient Education: Go to Patient Education Activity  Goal: Patient/Family Education  Outcome: Progressing Towards Goal     Problem: Nutrition Deficit  Goal: *Optimize nutritional status  Outcome: Progressing Towards Goal

## 2020-08-17 NOTE — PROGRESS NOTES
Comprehensive Nutrition Assessment    Type and Reason for Visit: Initial    Nutrition Recommendations/Plan:  1. Continue current: DIET DIABETIC CONSISTENT CARB Regular with snacks  2. Monitor labs, weight and PO intake       Nutrition Assessment:  Admitted for COPD exacerbation. Pt seen in room this afternoon. Reports she is doing well with her meals. Stated she has lost weight but stopped taking steroids and is back at her baseline weight prior to initial gain. Discussed tips for making snacks more nutrient dense and being mindful of further weight loss. Malnutrition Assessment:  Malnutrition Status:  No malnutrition      Nutrition History and Allergies: NKFA or problems chewing/swallowing. Noted some recent weight loss but Pt states no longer taking steroids on a regular basis. Prefers to have 1 large meal per day and snacks throughout the rest of the day. Estimated Daily Nutrient Needs:  Energy (kcal):  1521  Protein (g):  51-61       Fluid (ml/day):  1 mL/kcal    Nutrition Related Findings:  Denies any GI discomfort and last BM on (8/14) per I/Os. Wounds:    None       Current Nutrition Therapies:   DIET DIABETIC CONSISTENT CARB Regular    Anthropometric Measures:  · Height:  5' 2\" (157.5 cm)  · Current Body Wt:  50.7 kg (111 lb 12.4 oz)   · Admission Body Wt:  121 lb 14.6 oz    · Usual Body Wt:  120 lb     · BMI Category:  Underweight (BMI less than 22) age over 72       Nutrition Diagnosis:   · Underweight related to (predicted inadequate energy intake) as evidenced by BMI(20.4 and age >71 years)    Nutrition Interventions:   Food and/or Nutrient Delivery: Continue current diet, Snacks (specify)  Nutrition Education and Counseling: Counseling completed  Coordination of Nutrition Care: Continued inpatient monitoring    Goals:  PO nutrition intake will meet >75% of patient estimated nutritional needs within the next 7 days.        Nutrition Monitoring and Evaluation:   Food/Nutrient Intake Outcomes: Food and nutrient intake  Physical Signs/Symptoms Outcomes: Biochemical data, GI status, Meal time behavior, Weight    Discharge Planning:    Continue current diet     Dorisann Prader, RDN  Pager: [phone]

## 2020-08-17 NOTE — PROGRESS NOTES
Received patient from Gaurav Mulligan, [place].    5071 Patient off the floor to XR.    1200 walk test done. Patient 87% RA at rest sitting in chair. Was not walked without oxygen. At rest on 2L: 94%. Walking for 5 minutes on 2L NC: 93-94%.

## 2020-08-17 NOTE — PROGRESS NOTES
Problem: Falls - Risk of  Goal: *Absence of Falls  Description: Document Destiny Abreu Fall Risk and appropriate interventions in the flowsheet. Outcome: Progressing Towards Goal  Note: Fall Risk Interventions:  Mobility Interventions: Patient to call before getting OOB         Medication Interventions: Teach patient to arise slowly, Patient to call before getting OOB, Evaluate medications/consider consulting pharmacy    Elimination Interventions: Toileting schedule/hourly rounds, Toilet paper/wipes in reach, Stay With Me (per policy), Patient to call for help with toileting needs, Call light in reach              Problem: Patient Education: Go to Patient Education Activity  Goal: Patient/Family Education  Outcome: Progressing Towards Goal     Problem: Pressure Injury - Risk of  Goal: *Prevention of pressure injury  Description: Document Moi Scale and appropriate interventions in the flowsheet.   Outcome: Progressing Towards Goal  Note: Pressure Injury Interventions:  Sensory Interventions: Keep linens dry and wrinkle-free, Pressure redistribution bed/mattress (bed type)    Moisture Interventions: Absorbent underpads    Activity Interventions: Pressure redistribution bed/mattress(bed type), PT/OT evaluation    Mobility Interventions: HOB 30 degrees or less, Pressure redistribution bed/mattress (bed type)    Nutrition Interventions: Document food/fluid/supplement intake    Friction and Shear Interventions: HOB 30 degrees or less                Problem: Patient Education: Go to Patient Education Activity  Goal: Patient/Family Education  Outcome: Progressing Towards Goal     Problem: Pain  Goal: *Control of Pain  Outcome: Progressing Towards Goal     Problem: Patient Education: Go to Patient Education Activity  Goal: Patient/Family Education  Outcome: Progressing Towards Goal

## 2020-08-18 LAB
GLUCOSE BLD STRIP.AUTO-MCNC: 120 MG/DL (ref 70–110)
GLUCOSE BLD STRIP.AUTO-MCNC: 122 MG/DL (ref 70–110)
GLUCOSE BLD STRIP.AUTO-MCNC: 164 MG/DL (ref 70–110)
GLUCOSE BLD STRIP.AUTO-MCNC: 287 MG/DL (ref 70–110)

## 2020-08-18 PROCEDURE — 74011250636 HC RX REV CODE- 250/636: Performed by: INTERNAL MEDICINE

## 2020-08-18 PROCEDURE — 74011250637 HC RX REV CODE- 250/637: Performed by: INTERNAL MEDICINE

## 2020-08-18 PROCEDURE — 74011636637 HC RX REV CODE- 636/637: Performed by: HOSPITALIST

## 2020-08-18 PROCEDURE — 94640 AIRWAY INHALATION TREATMENT: CPT

## 2020-08-18 PROCEDURE — 65660000000 HC RM CCU STEPDOWN

## 2020-08-18 PROCEDURE — 74011636637 HC RX REV CODE- 636/637: Performed by: INTERNAL MEDICINE

## 2020-08-18 PROCEDURE — 74011000250 HC RX REV CODE- 250: Performed by: INTERNAL MEDICINE

## 2020-08-18 PROCEDURE — 77010033678 HC OXYGEN DAILY

## 2020-08-18 PROCEDURE — 94761 N-INVAS EAR/PLS OXIMETRY MLT: CPT

## 2020-08-18 PROCEDURE — 82962 GLUCOSE BLOOD TEST: CPT

## 2020-08-18 PROCEDURE — 74011000258 HC RX REV CODE- 258: Performed by: INTERNAL MEDICINE

## 2020-08-18 RX ADMIN — BUDESONIDE 500 MCG: 0.5 INHALANT RESPIRATORY (INHALATION) at 07:51

## 2020-08-18 RX ADMIN — METHYLPREDNISOLONE SODIUM SUCCINATE 60 MG: 125 INJECTION, POWDER, FOR SOLUTION INTRAMUSCULAR; INTRAVENOUS at 17:06

## 2020-08-18 RX ADMIN — MELATONIN 4.5 MG: at 21:47

## 2020-08-18 RX ADMIN — PANTOPRAZOLE SODIUM 20 MG: 20 TABLET, DELAYED RELEASE ORAL at 08:05

## 2020-08-18 RX ADMIN — BUSPIRONE HYDROCHLORIDE 7.5 MG: 7.5 TABLET ORAL at 08:05

## 2020-08-18 RX ADMIN — IPRATROPIUM BROMIDE AND ALBUTEROL SULFATE 3 ML: .5; 3 SOLUTION RESPIRATORY (INHALATION) at 16:55

## 2020-08-18 RX ADMIN — METHYLPREDNISOLONE SODIUM SUCCINATE 60 MG: 125 INJECTION, POWDER, FOR SOLUTION INTRAMUSCULAR; INTRAVENOUS at 12:16

## 2020-08-18 RX ADMIN — BUSPIRONE HYDROCHLORIDE 7.5 MG: 7.5 TABLET ORAL at 16:32

## 2020-08-18 RX ADMIN — AZITHROMYCIN MONOHYDRATE 250 MG: 500 INJECTION, POWDER, LYOPHILIZED, FOR SOLUTION INTRAVENOUS at 21:40

## 2020-08-18 RX ADMIN — METHYLPREDNISOLONE SODIUM SUCCINATE 60 MG: 125 INJECTION, POWDER, FOR SOLUTION INTRAMUSCULAR; INTRAVENOUS at 05:02

## 2020-08-18 RX ADMIN — Medication 10 ML: at 14:19

## 2020-08-18 RX ADMIN — MONTELUKAST 10 MG: 10 TABLET, FILM COATED ORAL at 08:05

## 2020-08-18 RX ADMIN — LORATADINE 10 MG: 10 TABLET ORAL at 08:05

## 2020-08-18 RX ADMIN — IPRATROPIUM BROMIDE AND ALBUTEROL SULFATE 3 ML: .5; 3 SOLUTION RESPIRATORY (INHALATION) at 07:51

## 2020-08-18 RX ADMIN — CEFTRIAXONE 1 G: 1 INJECTION, POWDER, FOR SOLUTION INTRAMUSCULAR; INTRAVENOUS at 14:18

## 2020-08-18 RX ADMIN — IPRATROPIUM BROMIDE AND ALBUTEROL SULFATE 3 ML: .5; 3 SOLUTION RESPIRATORY (INHALATION) at 19:59

## 2020-08-18 RX ADMIN — Medication 10 ML: at 05:02

## 2020-08-18 RX ADMIN — BUDESONIDE 500 MCG: 0.5 INHALANT RESPIRATORY (INHALATION) at 19:59

## 2020-08-18 RX ADMIN — METHYLPREDNISOLONE SODIUM SUCCINATE 60 MG: 125 INJECTION, POWDER, FOR SOLUTION INTRAMUSCULAR; INTRAVENOUS at 00:22

## 2020-08-18 RX ADMIN — FOLIC ACID 1 MG: 1 TABLET ORAL at 08:05

## 2020-08-18 RX ADMIN — BUSPIRONE HYDROCHLORIDE 7.5 MG: 7.5 TABLET ORAL at 21:48

## 2020-08-18 RX ADMIN — INSULIN LISPRO 2 UNITS: 100 INJECTION, SOLUTION INTRAVENOUS; SUBCUTANEOUS at 16:33

## 2020-08-18 RX ADMIN — Medication 10 ML: at 21:16

## 2020-08-18 RX ADMIN — INSULIN GLARGINE 13 UNITS: 100 INJECTION, SOLUTION SUBCUTANEOUS at 21:48

## 2020-08-18 RX ADMIN — INSULIN LISPRO 6 UNITS: 100 INJECTION, SOLUTION INTRAVENOUS; SUBCUTANEOUS at 22:17

## 2020-08-18 RX ADMIN — IPRATROPIUM BROMIDE AND ALBUTEROL SULFATE 3 ML: .5; 3 SOLUTION RESPIRATORY (INHALATION) at 12:58

## 2020-08-18 RX ADMIN — ENOXAPARIN SODIUM 40 MG: 40 INJECTION SUBCUTANEOUS at 21:16

## 2020-08-18 NOTE — PROGRESS NOTES
1900  -- Bedside, Verbal and Written shift change report given to 2309 Fabby Ordaz (oncoming nurse) by DENISJIDIANNAYL (RADHA nurse). Report included the following information SBAR, Kardex, Intake/Output, MAR and Recent Results.       2052 -- PM medications administered, pt tolerated with ease, will continue to monitor. 2250 -- PM medications administered, pt tolerated with ease, will continue to monitor.     0030 -- Shift reassessment, pt condition unchanged, will continue to monitor.      0415 --  Shift reassessment, pt condition unchanged, will continue to monitor.        0700  -- Bedside, Verbal and Written shift change report given to VINOD  (oncoming nurse) by MARIE (offgoing nurse). Report included the following information SBAR, Kardex, Intake/Output, MAR and Recent Results. Skin assessment completed.

## 2020-08-18 NOTE — PROGRESS NOTES
8/18/2020  Patient with failed walk testing yesterday. Unsure if will be ok with insurance due to still being on antibiotics and admission listed as acute. Edouard Álvarez asking when pt is to be dc/d. Walk tests are only good for 48 hours. I went down and spoke to pt. She is somewhat upset that she has to go home with o2, says she never had to before. She asked if I knew how long she would be on it. I let her know that I don't know. I discussed Home Health and gave her the list to review, says she wants to talk it over with her . Donell Gupta.  [facility], BSN  [facility]  [phone], Pager [phone]  [email or web address]

## 2020-08-18 NOTE — PROGRESS NOTES
Report received from [place], Pending sale to [facility]. Patient resting in bed.

## 2020-08-18 NOTE — PROGRESS NOTES
Problem: Falls - Risk of  Goal: *Absence of Falls  Description: Document Lena Butcher Fall Risk and appropriate interventions in the flowsheet. Outcome: Progressing Towards Goal  Note: Fall Risk Interventions:  Mobility Interventions: Patient to call before getting OOB         Medication Interventions: Teach patient to arise slowly    Elimination Interventions: Toileting schedule/hourly rounds              Problem: Patient Education: Go to Patient Education Activity  Goal: Patient/Family Education  Outcome: Progressing Towards Goal     Problem: Pressure Injury - Risk of  Goal: *Prevention of pressure injury  Description: Document Moi Scale and appropriate interventions in the flowsheet.   Outcome: Progressing Towards Goal  Note: Pressure Injury Interventions:  Sensory Interventions: Assess changes in LOC    Moisture Interventions: Absorbent underpads    Activity Interventions: PT/OT evaluation, Pressure redistribution bed/mattress(bed type), Increase time out of bed    Mobility Interventions: HOB 30 degrees or less, Pressure redistribution bed/mattress (bed type)    Nutrition Interventions: Document food/fluid/supplement intake    Friction and Shear Interventions: HOB 30 degrees or less                Problem: Patient Education: Go to Patient Education Activity  Goal: Patient/Family Education  Outcome: Progressing Towards Goal     Problem: Pain  Goal: *Control of Pain  Outcome: Progressing Towards Goal     Problem: Patient Education: Go to Patient Education Activity  Goal: Patient/Family Education  Outcome: Progressing Towards Goal     Problem: Chronic Obstructive Pulmonary Disease (COPD)  Goal: *Oxygen saturation during activity within specified parameters  Outcome: Progressing Towards Goal  Goal: *Able to remain out of bed as prescribed  Outcome: Progressing Towards Goal  Goal: *Absence of hypoxia  Outcome: Progressing Towards Goal  Goal: *Optimize nutritional status  Outcome: Progressing Towards Goal     Problem: Patient Education: Go to Patient Education Activity  Goal: Patient/Family Education  Outcome: Progressing Towards Goal     Problem: Nutrition Deficit  Goal: *Optimize nutritional status  Outcome: Progressing Towards Goal     Problem: Gas Exchange - Impaired  Goal: *Absence of hypoxia  Outcome: Progressing Towards Goal

## 2020-08-18 NOTE — PROGRESS NOTES
Problem: Chronic Obstructive Pulmonary Disease (COPD)  Goal: *Oxygen saturation during activity within specified parameters  Outcome: Progressing Towards Goal  Goal: *Able to remain out of bed as prescribed  Outcome: Progressing Towards Goal  Goal: *Absence of hypoxia  Outcome: Progressing Towards Goal     Problem: Gas Exchange - Impaired  Goal: *Absence of hypoxia  Outcome: Progressing Towards Goal   Respiratory Therapy Assessment Care Plan    Patient: Alma Crisostomo 69 y.o. female 8/18/2020 7:55 AM    COPD with acute exacerbation [facility]) [J44.1]      Chest X-RAY:   Results from Hospital Encounter encounter on 08/14/20   XR CHEST PA LAT    Impression IMPRESSION:    Diffuse interstitial opacities with subtle right peripheral opacities. XR CHEST PORT    Impression IMPRESSION:  Progression of coarse interstitial opacities within both lungs,  right greater than left. Findings may represent progressing pulmonary fibrosis,  however correlate clinically to exclude superimposed atypical infection. Results from [place] encounter on 05/04/20   XR CHEST PORT    Impression IMPRESSION:    Coarsened interstitial markings.            Vital Signs:   Visit Vitals  /81   Pulse 79   Temp 97.6 °F (36.4 °C)   Resp 18   Ht 5' 2\" (1.575 m)   Wt 50.7 kg (111 lb 11.2 oz)   SpO2 92%   BMI 20.43 kg/m²         Indications for treatment: COPD exacerbation        Plan of care: O2 therapy, DuoNeb QID, Pulmicort BID           Goal: Oxygenation, wean O2 as tolerated, improve SOB, COPD maintenance

## 2020-08-18 NOTE — PROGRESS NOTES
Problem: Falls - Risk of  Goal: *Absence of Falls  Description: Document Radha Gomez Fall Risk and appropriate interventions in the flowsheet. Outcome: Progressing Towards Goal  Note: Fall Risk Interventions:  Mobility Interventions: Patient to call before getting OOB         Medication Interventions: Bed/chair exit alarm, Evaluate medications/consider consulting pharmacy, Patient to call before getting OOB, Teach patient to arise slowly    Elimination Interventions: Toileting schedule/hourly rounds              Problem: Patient Education: Go to Patient Education Activity  Goal: Patient/Family Education  Outcome: Progressing Towards Goal     Problem: Pressure Injury - Risk of  Goal: *Prevention of pressure injury  Description: Document Moi Scale and appropriate interventions in the flowsheet.   Outcome: Progressing Towards Goal  Note: Pressure Injury Interventions:  Sensory Interventions: Assess changes in LOC    Moisture Interventions: Absorbent underpads    Activity Interventions: Increase time out of bed    Mobility Interventions: HOB 30 degrees or less    Nutrition Interventions: Document food/fluid/supplement intake    Friction and Shear Interventions: HOB 30 degrees or less                Problem: Patient Education: Go to Patient Education Activity  Goal: Patient/Family Education  Outcome: Progressing Towards Goal     Problem: Pain  Goal: *Control of Pain  Outcome: Progressing Towards Goal     Problem: Chronic Obstructive Pulmonary Disease (COPD)  Goal: *Oxygen saturation during activity within specified parameters  Outcome: Progressing Towards Goal  Goal: *Able to remain out of bed as prescribed  Outcome: Progressing Towards Goal  Goal: *Absence of hypoxia  Outcome: Progressing Towards Goal  Goal: *Optimize nutritional status  Outcome: Progressing Towards Goal     Problem: Patient Education: Go to Patient Education Activity  Goal: Patient/Family Education  Outcome: Progressing Towards Goal     Problem: Nutrition Deficit  Goal: *Optimize nutritional status  Outcome: Progressing Towards Goal     Problem: Gas Exchange - Impaired  Goal: *Absence of hypoxia  Outcome: Progressing Towards Goal

## 2020-08-18 NOTE — PROGRESS NOTES
Progress Note      Patient:  Jose Elias Funez               Sex: female          DOA: 8/14/2020       YOB: 1950      Age:  69 y.o.        LOS:  LOS: 4 days             CHIEF COMPLAINT:  COPD exacerbation  '  Subjective:     Patient awake and talking  No distress    Objective:      Visit Vitals  /84 (BP 1 Location: Right arm, BP Patient Position: At rest)   Pulse 81   Temp 98 °F (36.7 °C)   Resp 18   Ht 5' 2\" (1.575 m)   Wt 50.7 kg (111 lb 11.2 oz)   SpO2 91%   BMI 20.43 kg/m²       Physical Exam:  Gen:  No distress, no complaint  Lungs:  Clear bilaterally, no wheeze or rhonchi  Heart:  Regular rate and rhythm, no murmurs or gallops  Abdomen:  Soft, non-tender, normal bowel sounds        Lab/Data Reviewed:    Labs reviewed      Assessment/Plan     Principal Problem:    COPD with acute exacerbation ([facility] (8/14/2020)    Active Problems:    DM (diabetes mellitus) ([facility] (5/4/2020)      Rheumatoid arthritis ([facility] (8/14/2020)        Plan:  Continue with treatment of COPD  Nebulization treatments  BS control  We discussed oxygen at home, will repeat walk test in AM  She has as good Pulmonologist relationship

## 2020-08-19 VITALS
SYSTOLIC BLOOD PRESSURE: 187 MMHG | HEIGHT: 62 IN | DIASTOLIC BLOOD PRESSURE: 80 MMHG | OXYGEN SATURATION: 91 % | BODY MASS INDEX: 20.56 KG/M2 | WEIGHT: 111.7 LBS | TEMPERATURE: 98.1 F | RESPIRATION RATE: 18 BRPM | HEART RATE: 82 BPM

## 2020-08-19 LAB
GLUCOSE BLD STRIP.AUTO-MCNC: 150 MG/DL (ref 70–110)
GLUCOSE BLD STRIP.AUTO-MCNC: 167 MG/DL (ref 70–110)

## 2020-08-19 PROCEDURE — 74011636637 HC RX REV CODE- 636/637: Performed by: INTERNAL MEDICINE

## 2020-08-19 PROCEDURE — 74011250636 HC RX REV CODE- 250/636: Performed by: INTERNAL MEDICINE

## 2020-08-19 PROCEDURE — 94640 AIRWAY INHALATION TREATMENT: CPT

## 2020-08-19 PROCEDURE — 94761 N-INVAS EAR/PLS OXIMETRY MLT: CPT

## 2020-08-19 PROCEDURE — 82962 GLUCOSE BLOOD TEST: CPT

## 2020-08-19 PROCEDURE — 74011250637 HC RX REV CODE- 250/637: Performed by: INTERNAL MEDICINE

## 2020-08-19 PROCEDURE — 74011000258 HC RX REV CODE- 258: Performed by: INTERNAL MEDICINE

## 2020-08-19 PROCEDURE — 74011000250 HC RX REV CODE- 250: Performed by: INTERNAL MEDICINE

## 2020-08-19 RX ORDER — PREDNISONE 10 MG/1
TABLET ORAL
Qty: 21 TAB | Refills: 0 | Status: SHIPPED | OUTPATIENT
Start: 2020-08-19 | End: 2020-11-07

## 2020-08-19 RX ADMIN — BUSPIRONE HYDROCHLORIDE 7.5 MG: 7.5 TABLET ORAL at 15:21

## 2020-08-19 RX ADMIN — BUSPIRONE HYDROCHLORIDE 7.5 MG: 7.5 TABLET ORAL at 08:42

## 2020-08-19 RX ADMIN — CEFTRIAXONE 1 G: 1 INJECTION, POWDER, FOR SOLUTION INTRAMUSCULAR; INTRAVENOUS at 15:21

## 2020-08-19 RX ADMIN — IPRATROPIUM BROMIDE AND ALBUTEROL SULFATE 3 ML: .5; 3 SOLUTION RESPIRATORY (INHALATION) at 12:33

## 2020-08-19 RX ADMIN — METHYLPREDNISOLONE SODIUM SUCCINATE 60 MG: 125 INJECTION, POWDER, FOR SOLUTION INTRAMUSCULAR; INTRAVENOUS at 05:30

## 2020-08-19 RX ADMIN — METHYLPREDNISOLONE SODIUM SUCCINATE 60 MG: 125 INJECTION, POWDER, FOR SOLUTION INTRAMUSCULAR; INTRAVENOUS at 12:17

## 2020-08-19 RX ADMIN — LORATADINE 10 MG: 10 TABLET ORAL at 08:42

## 2020-08-19 RX ADMIN — MONTELUKAST 10 MG: 10 TABLET, FILM COATED ORAL at 08:42

## 2020-08-19 RX ADMIN — FOLIC ACID 1 MG: 1 TABLET ORAL at 08:42

## 2020-08-19 RX ADMIN — PANTOPRAZOLE SODIUM 20 MG: 20 TABLET, DELAYED RELEASE ORAL at 08:42

## 2020-08-19 RX ADMIN — IPRATROPIUM BROMIDE AND ALBUTEROL SULFATE 3 ML: .5; 3 SOLUTION RESPIRATORY (INHALATION) at 07:32

## 2020-08-19 RX ADMIN — Medication 10 ML: at 05:29

## 2020-08-19 RX ADMIN — Medication 10 ML: at 15:21

## 2020-08-19 RX ADMIN — METHYLPREDNISOLONE SODIUM SUCCINATE 60 MG: 125 INJECTION, POWDER, FOR SOLUTION INTRAMUSCULAR; INTRAVENOUS at 00:02

## 2020-08-19 RX ADMIN — INSULIN LISPRO 2 UNITS: 100 INJECTION, SOLUTION INTRAVENOUS; SUBCUTANEOUS at 12:17

## 2020-08-19 RX ADMIN — BUDESONIDE 500 MCG: 0.5 INHALANT RESPIRATORY (INHALATION) at 07:32

## 2020-08-19 RX ADMIN — INSULIN LISPRO 2 UNITS: 100 INJECTION, SOLUTION INTRAVENOUS; SUBCUTANEOUS at 08:42

## 2020-08-19 NOTE — PROGRESS NOTES
To Whom it May Concern,    Ms Cha Sarmiento was hospitalized at [place] [address] for medical care from 8/14/20 - 8/19/20. She is not able to travel for medical reasons until cleared by her Pulmonologist.    Please call for questions, [phone].     Sincerely,        Manuel Lainez MD

## 2020-08-19 NOTE — PROGRESS NOTES
Problem: Falls - Risk of  Goal: *Absence of Falls  Description: Document Rebbecca Persons Fall Risk and appropriate interventions in the flowsheet. Outcome: Progressing Towards Goal  Note: Fall Risk Interventions:  Mobility Interventions: Patient to call before getting OOB         Medication Interventions: Teach patient to arise slowly    Elimination Interventions: Toileting schedule/hourly rounds              Problem: Patient Education: Go to Patient Education Activity  Goal: Patient/Family Education  Outcome: Progressing Towards Goal     Problem: Pressure Injury - Risk of  Goal: *Prevention of pressure injury  Description: Document Moi Scale and appropriate interventions in the flowsheet.   Outcome: Progressing Towards Goal  Note: Pressure Injury Interventions:  Sensory Interventions: Assess changes in LOC    Moisture Interventions: Absorbent underpads    Activity Interventions: PT/OT evaluation, Pressure redistribution bed/mattress(bed type), Increase time out of bed    Mobility Interventions: HOB 30 degrees or less, Pressure redistribution bed/mattress (bed type)    Nutrition Interventions: Document food/fluid/supplement intake    Friction and Shear Interventions: HOB 30 degrees or less                Problem: Patient Education: Go to Patient Education Activity  Goal: Patient/Family Education  Outcome: Progressing Towards Goal     Problem: Pain  Goal: *Control of Pain  Outcome: Progressing Towards Goal     Problem: Patient Education: Go to Patient Education Activity  Goal: Patient/Family Education  Outcome: Progressing Towards Goal     Problem: Chronic Obstructive Pulmonary Disease (COPD)  Goal: *Oxygen saturation during activity within specified parameters  Outcome: Progressing Towards Goal  Goal: *Able to remain out of bed as prescribed  Outcome: Progressing Towards Goal  Goal: *Absence of hypoxia  Outcome: Progressing Towards Goal  Goal: *Optimize nutritional status  Outcome: Progressing Towards Goal

## 2020-08-19 NOTE — PROGRESS NOTES
Bedside shift change report given to Sister Mary Davila RN (oncoming nurse) by Henrique Goncalves RN (offgoing nurse). Report included the following information SBAR, Kardex, Intake/Output and MAR.

## 2020-08-19 NOTE — PROGRESS NOTES
Problem: Falls - Risk of  Goal: *Absence of Falls  Description: Document India Levine Fall Risk and appropriate interventions in the flowsheet.   Outcome: Progressing Towards Goal  Note: Fall Risk Interventions:  Mobility Interventions: Patient to call before getting OOB         Medication Interventions: Patient to call before getting OOB, Teach patient to arise slowly    Elimination Interventions: Patient to call for help with toileting needs, Call light in reach, Toileting schedule/hourly rounds              Problem: Pain  Goal: *Control of Pain  Outcome: Progressing Towards Goal     Problem: Chronic Obstructive Pulmonary Disease (COPD)  Goal: *Oxygen saturation during activity within specified parameters  Outcome: Progressing Towards Goal     Problem: Chronic Obstructive Pulmonary Disease (COPD)  Goal: *Absence of hypoxia  Outcome: Progressing Towards Goal     Problem: Gas Exchange - Impaired  Goal: *Absence of hypoxia  Outcome: Progressing Towards Goal

## 2020-08-19 NOTE — PROGRESS NOTES
Respiratory Therapy Assessment Care Plan    Patient: Brandy Mejia 69 y.o. female 8/19/2020 7:59 AM    COPD with acute exacerbation ([address] [J44.1]      Chest X-RAY:   Results from Hospital Encounter encounter on 08/14/20   XR CHEST PA LAT    Impression IMPRESSION:    Diffuse interstitial opacities with subtle right peripheral opacities. XR CHEST PORT    Impression IMPRESSION:  Progression of coarse interstitial opacities within both lungs,  right greater than left. Findings may represent progressing pulmonary fibrosis,  however correlate clinically to exclude superimposed atypical infection. Results from [place] encounter on 05/04/20   XR CHEST PORT    Impression IMPRESSION:    Coarsened interstitial markings.            Vital Signs:   Visit Vitals  /75 (BP 1 Location: Left arm, BP Patient Position: At rest)   Pulse 75   Temp 97.6 °F (36.4 °C)   Resp 18   Ht 5' 2\" (1.575 m)   Wt 50.7 kg (111 lb 11.2 oz)   SpO2 96%   BMI 20.43 kg/m²         Indications for treatment: COPD exacerbation      Plan of care:breathing treatments and oxygen therapy weaning as tolerated, walking test        Goal:COPD maintenance, patient improvement

## 2020-08-19 NOTE — PROGRESS NOTES
Discharge/Transition Planning    Care Management following and chart reviewed. Pt on RA at 96% this morning. Will not pursue home o2 at this time and pt would prefer not having as well. Plan is home    Care Management Interventions  PCP Verified by CM: Yes  Palliative Care Criteria Met (RRAT>21 & CHF Dx)?: No  Mode of Transport at Discharge: Other (see comment)  Transition of Care Consult (CM Consult):  Other  Discharge Durable Medical Equipment: No  Physical Therapy Consult: No  Occupational Therapy Consult: No  Speech Therapy Consult: No  Current Support Network: Lives with Spouse  Confirm Follow Up Transport: Family  The Patient and/or Patient Representative was Provided with a Choice of Provider and Agrees with the Discharge Plan?: No  Freedom of Choice List was Provided with Basic Dialogue that Supports the Patient's Individualized Plan of Care/Goals, Treatment Preferences and Shares the Quality Data Associated with the Providers?: No  Discharge Location  Discharge Placement: Home     2nd medicare letter given  Vikki Mendoza RN BSN  Outcomes Manager  Office # [phone]  Pager # [phone]

## 2020-08-19 NOTE — PROGRESS NOTES
1244-  Bedside and Verbal shift change report given to Chiara Israel RN (oncoming nurse) by Shanon Perez RN (offgoing nurse). Report included the following information SBAR, Kardex, Intake/Output, MAR and Recent Results.

## 2020-08-19 NOTE — PROGRESS NOTES
1945  Bedside, Verbal, and Written shift change report given by jef  Pt in bed resting alert and oriented x4 denies pain at the moment pt denies nausea and vomiting, denies diarrhea. Assessement completed plan of care for the shift explained pt verbalized understanding. HOB elevated, bed low and in locked position. Call light and frequently used items within reach. 2147 Patient is in bed, resting given all scheduled med tolerated well no concerns voiced     mg /dl coverage given as per sliding scale. 0000  Patient in bed sleeping. O2 off to see how she tolerates on RA-     0100  Patient is in bed,sleeping. No sign of distress. Bed low, call bell within reach. 0200   Rounding - Patient  in bed,  sleeping. No sign of distress. Bed low, call bell within reach. 0300  Patient  sleeping. No sign of distress. Bed low, call bell within reach. Pt breathing ok on RA. 0358- Reassessment completed no changes     0530- Uneventiful night- pt resting, denies SOB , CP nausea or vomiting    0730  Bedside, Verbal, and Written shift change report given  to Reyna Rodriguez (oncoming nurse) by Janae Beck RN (offgoing nurse). Report included the following information SBAR, Kardex, and MAR.

## 2020-08-20 ENCOUNTER — PATIENT OUTREACH (OUTPATIENT)
Dept: CASE MANAGEMENT | Age: 70
End: 2020-08-20

## 2020-08-27 NOTE — PROGRESS NOTES
Physician Progress Note      PATIENT:               Karina Foote  [facility] #:                  [id number]  :                       1950  ADMIT DATE:       2020 12:46 PM  [address] DATE:        2020 5:00 PM  RESPONDING  PROVIDER #:        Coni Munson MD          QUERY TEXT:    Dr. Snoy Alva,    Pt admitted with SOB . Pt noted to have pneumonia doumented. If possible, please document in the progress notes and discharge summary if you can clarify if pneumonia was ruled in : The medical record reflects the following:  Risk Factors: 68 yo female with PMH COPD and pneumonia    Clinical Indicators:=> Pneumonia in the setting of COPD  Per ED physician progress note  Start patient Rocephin and doxy for likely respiratory source for her pneumonia    CXR on admission  Progression of coarse interstitial opacities within both lungs, right greater than left. Findings may represent progressing pulmonary fibrosis, however correlate clinically to exclude superimposed atypical infection      Treatment: IV antibiotics x 6 days, CXR, Duo-neb scheduled treatments, Solu-Medrol      Thank your time,  Zina Rojas RN, [facility]  [phone]  Options provided:  -- Pneumonia POA  -- No pneumonia diagnosed  -- Other - I will add my own diagnosis  -- Disagree - Not applicable / Not valid  -- Disagree - Clinically unable to determine / Unknown  -- Refer to Clinical Documentation Reviewer    PROVIDER RESPONSE TEXT:    This patient had Pneumonia POA. Query created by: Black Rosario on 2020 12:39 PM      QUERY TEXT:    Dr. Sony Alva,    If the patient has a confirmed diagnosis of Pneumonia, please review the following query:    Pt admitted with pneumonia . Pt noted to have Temp 100.5, WBC 19, LA 2.34, prolactin 1.62. If possible, please document in the progress notes and discharge summary if you are evaluating and /or treating any of the following:     The medical record reflects the following:  Risk Factors: 68 yo female with history of COPD, admitted with SOB    Clinical Indicators: Temp 100.5   => WBC 19  => Lactic acid 2.34  => Proclacitorin 1.62    Treatment: IV fluids, IV antibiotics for 6 days, serial labs    Thank your time,  Anisa Rondon RN, [facility]  [phone]  Options provided:  -- Sepsis, due to pneumonia present on admission  -- Sepsis due to pneumonia , now resolved,  -- Sepsis, not present on admission,  -- No Sepsis, localized infection only  -- Sepsis was ruled out  -- Other - I will add my own diagnosis  -- Disagree - Not applicable / Not valid  -- Disagree - Clinically unable to determine / Unknown  -- Refer to Clinical Documentation Reviewer    PROVIDER RESPONSE TEXT:    This patient had Sepsis due to pneumonia which was present on admission.     Query created by: Anam Lainez on 8/25/2020 5:23 PM      Electronically signed by:  Kip Laboy MD 8/27/2020 10:49 AM

## 2020-09-02 NOTE — DISCHARGE SUMMARY
[address]    Name:  Donavon Quinn  MR#:   [id number]  :  1950  ACCOUNT #:  [de-identified]  ADMIT DATE:  2020  DISCHARGE DATE:  2020    ADMITTING DIAGNOSIS:  Chronic obstructive pulmonary disease exacerbation. HISTORY OF PRESENT ILLNESS:  The patient is a 58-year-old female, who presented to the Emergency Department with shortness of breath. It has been worsening for 2 days prior to admission. She has a known history of COPD and sees Dr. Yo Cardoza as an outpatient. In the emergency room, she was found to have hypoxia, and she was admitted for ongoing management. HOSPITAL COURSE:  The patient was begun on nebulization treatments of steroids. Her respiratory status improved sequentially. She continued to improve, and by 2020, she was considered ready for discharge. She was discharged home. DIAGNOSES:  1. Chronic obstructive pulmonary disease exacerbation, improved. 2.  Rheumatoid arthritis, on methotrexate. 3.  Diabetes. 4.  Gastroesophageal reflux disease. CONDITION ON DISCHARGE:  Her condition is improved. ACTIVITY:  Ad lacey. DISCHARGE INSTRUCTIONS:  Followup is with Dr. Yo Cardoza in 1 week, the patient will arrange. DISCHARGE MEDICATIONS:  Medications at the time of discharge are;  1.  Abatacept 250 mg injections via home regimen. 2.  Advair 250/50 one puff by inhalation 2 times daily. 3.  BuSpar 7.5 mg by mouth 3 times daily. 4.  Claritin 10 mg by mouth daily. 5.  Folic acid 1 mg by mouth daily. 6.  Melatonin 10 mg nightly as needed for sleep. 7.  Metformin 500 mg by mouth 2 times daily. 8.  Methotrexate 2.5 mg by mouth on  per home regimen. 9.  Omeprazole 20 mg by mouth daily. 10.  Potassium chloride 20 mEq by mouth 2 times daily. 11.  Prednisone taper 60 to 10 mg by mouth daily over 6 days and discontinue. 12.  Prozac 20 mg by mouth daily. 13.  Reclast 5 mg IV per home regimen.   14.  Singulair 10 mg by mouth daily.    DIET:  Regular. DISPOSITION:  Home.     Total Discharge time 35 minutes      Francisco White MD      [id number]  D:  09/02/2020 9:04  T:  09/02/2020 16:41  JOB #:  [id number]

## 2020-09-03 ENCOUNTER — PATIENT OUTREACH (OUTPATIENT)
Dept: CASE MANAGEMENT | Age: 70
End: 2020-09-03

## 2020-09-03 NOTE — PROGRESS NOTES
Unable to reach (14 day follow up)    Attempt made to reach the patient by telephone. Left HIPPA compliant message requesting a return call. The patient is resolved from the Transitions of Care episode. No further follow up will be required at this time.

## 2020-11-02 ENCOUNTER — HOSPITAL ENCOUNTER (INPATIENT)
Age: 70
LOS: 5 days | Discharge: HOME | DRG: 193 | End: 2020-11-07
Attending: EMERGENCY MEDICINE | Admitting: INTERNAL MEDICINE
Payer: MEDICARE

## 2020-11-02 ENCOUNTER — APPOINTMENT (OUTPATIENT)
Dept: GENERAL RADIOLOGY | Age: 70
DRG: 193 | End: 2020-11-02
Attending: PHYSICIAN ASSISTANT
Payer: MEDICARE

## 2020-11-02 DIAGNOSIS — E87.6 HYPOKALEMIA: ICD-10-CM

## 2020-11-02 DIAGNOSIS — A41.9 SEPSIS WITHOUT ACUTE ORGAN DYSFUNCTION, DUE TO UNSPECIFIED ORGANISM (HCC): ICD-10-CM

## 2020-11-02 DIAGNOSIS — M45.9 RHEUMATOID ARTHRITIS INVOLVING VERTEBRA, UNSPECIFIED WHETHER RHEUMATOID FACTOR PRESENT (HCC): ICD-10-CM

## 2020-11-02 DIAGNOSIS — J18.9 MULTIFOCAL PNEUMONIA: Primary | ICD-10-CM

## 2020-11-02 PROBLEM — R65.10 SIRS (SYSTEMIC INFLAMMATORY RESPONSE SYNDROME) (HCC): Status: ACTIVE | Noted: 2020-11-02

## 2020-11-02 PROBLEM — J44.9 COPD (CHRONIC OBSTRUCTIVE PULMONARY DISEASE) (HCC): Status: ACTIVE | Noted: 2020-11-02

## 2020-11-02 LAB
ALBUMIN SERPL-MCNC: 2.7 G/DL (ref 3.4–5)
ALBUMIN/GLOB SERPL: 0.6 {RATIO} (ref 0.8–1.7)
ALP SERPL-CCNC: 72 U/L (ref 45–117)
ALT SERPL-CCNC: 12 U/L (ref 13–56)
ANION GAP SERPL CALC-SCNC: 5 MMOL/L (ref 3–18)
APPEARANCE UR: CLEAR
AST SERPL-CCNC: 14 U/L (ref 10–38)
BASOPHILS # BLD: 0.1 K/UL (ref 0–0.1)
BASOPHILS NFR BLD: 0 % (ref 0–2)
BILIRUB SERPL-MCNC: 0.3 MG/DL (ref 0.2–1)
BILIRUB UR QL: NEGATIVE
BUN SERPL-MCNC: 8 MG/DL (ref 7–18)
BUN/CREAT SERPL: 12 (ref 12–20)
CALCIUM SERPL-MCNC: 9 MG/DL (ref 8.5–10.1)
CHLORIDE SERPL-SCNC: 104 MMOL/L (ref 100–111)
CO2 SERPL-SCNC: 26 MMOL/L (ref 21–32)
COLOR UR: YELLOW
CREAT SERPL-MCNC: 0.67 MG/DL (ref 0.6–1.3)
DIFFERENTIAL METHOD BLD: ABNORMAL
EOSINOPHIL # BLD: 0.2 K/UL (ref 0–0.4)
EOSINOPHIL NFR BLD: 1 % (ref 0–5)
ERYTHROCYTE [DISTWIDTH] IN BLOOD BY AUTOMATED COUNT: 15 % (ref 11.6–14.5)
GLOBULIN SER CALC-MCNC: 4.2 G/DL (ref 2–4)
GLUCOSE BLD STRIP.AUTO-MCNC: 115 MG/DL (ref 70–110)
GLUCOSE BLD STRIP.AUTO-MCNC: 91 MG/DL (ref 70–110)
GLUCOSE SERPL-MCNC: 96 MG/DL (ref 74–99)
GLUCOSE UR STRIP.AUTO-MCNC: NEGATIVE MG/DL
HCT VFR BLD AUTO: 30.1 % (ref 35–45)
HGB BLD-MCNC: 9.6 G/DL (ref 12–16)
HGB UR QL STRIP: NEGATIVE
KETONES UR QL STRIP.AUTO: NEGATIVE MG/DL
LACTATE BLD-SCNC: 1.12 MMOL/L (ref 0.4–2)
LEUKOCYTE ESTERASE UR QL STRIP.AUTO: NEGATIVE
LYMPHOCYTES # BLD: 2.2 K/UL (ref 0.9–3.6)
LYMPHOCYTES NFR BLD: 14 % (ref 21–52)
MCH RBC QN AUTO: 29 PG (ref 24–34)
MCHC RBC AUTO-ENTMCNC: 31.9 G/DL (ref 31–37)
MCV RBC AUTO: 90.9 FL (ref 74–97)
MONOCYTES # BLD: 2 K/UL (ref 0.05–1.2)
MONOCYTES NFR BLD: 12 % (ref 3–10)
NEUTS SEG # BLD: 12.1 K/UL (ref 1.8–8)
NEUTS SEG NFR BLD: 73 % (ref 40–73)
NITRITE UR QL STRIP.AUTO: NEGATIVE
PH UR STRIP: 6 [PH] (ref 5–8)
PLATELET # BLD AUTO: 581 K/UL (ref 135–420)
PMV BLD AUTO: 8.8 FL (ref 9.2–11.8)
POTASSIUM SERPL-SCNC: 3.3 MMOL/L (ref 3.5–5.5)
PROT SERPL-MCNC: 6.9 G/DL (ref 6.4–8.2)
PROT UR STRIP-MCNC: NEGATIVE MG/DL
RBC # BLD AUTO: 3.31 M/UL (ref 4.2–5.3)
SODIUM SERPL-SCNC: 135 MMOL/L (ref 136–145)
SP GR UR REFRACTOMETRY: 1.01 (ref 1–1.03)
UROBILINOGEN UR QL STRIP.AUTO: 0.2 EU/DL (ref 0.2–1)
WBC # BLD AUTO: 16.6 K/UL (ref 4.6–13.2)

## 2020-11-02 PROCEDURE — 94761 N-INVAS EAR/PLS OXIMETRY MLT: CPT

## 2020-11-02 PROCEDURE — 74011250637 HC RX REV CODE- 250/637: Performed by: INTERNAL MEDICINE

## 2020-11-02 PROCEDURE — 87450 LEGIONELLA PNEUMOPHILA AG, URINE: CPT

## 2020-11-02 PROCEDURE — 94664 DEMO&/EVAL PT USE INHALER: CPT

## 2020-11-02 PROCEDURE — 74011000250 HC RX REV CODE- 250: Performed by: INTERNAL MEDICINE

## 2020-11-02 PROCEDURE — 71045 X-RAY EXAM CHEST 1 VIEW: CPT

## 2020-11-02 PROCEDURE — 94640 AIRWAY INHALATION TREATMENT: CPT

## 2020-11-02 PROCEDURE — 96365 THER/PROPH/DIAG IV INF INIT: CPT

## 2020-11-02 PROCEDURE — 74011250636 HC RX REV CODE- 250/636: Performed by: PHYSICIAN ASSISTANT

## 2020-11-02 PROCEDURE — 99285 EMERGENCY DEPT VISIT HI MDM: CPT

## 2020-11-02 PROCEDURE — 82962 GLUCOSE BLOOD TEST: CPT

## 2020-11-02 PROCEDURE — 74011250636 HC RX REV CODE- 250/636: Performed by: INTERNAL MEDICINE

## 2020-11-02 PROCEDURE — 93005 ELECTROCARDIOGRAM TRACING: CPT

## 2020-11-02 PROCEDURE — 85025 COMPLETE CBC W/AUTO DIFF WBC: CPT

## 2020-11-02 PROCEDURE — 77030021352 HC CBL LD SYS DISP COVD -B

## 2020-11-02 PROCEDURE — 81003 URINALYSIS AUTO W/O SCOPE: CPT

## 2020-11-02 PROCEDURE — 87449 NOS EACH ORGANISM AG IA: CPT

## 2020-11-02 PROCEDURE — 65660000000 HC RM CCU STEPDOWN

## 2020-11-02 PROCEDURE — 80053 COMPREHEN METABOLIC PANEL: CPT

## 2020-11-02 PROCEDURE — 74011636637 HC RX REV CODE- 636/637: Performed by: INTERNAL MEDICINE

## 2020-11-02 PROCEDURE — 2709999900 HC NON-CHARGEABLE SUPPLY

## 2020-11-02 PROCEDURE — 74011000258 HC RX REV CODE- 258: Performed by: PHYSICIAN ASSISTANT

## 2020-11-02 PROCEDURE — 83605 ASSAY OF LACTIC ACID: CPT

## 2020-11-02 PROCEDURE — 74011250637 HC RX REV CODE- 250/637: Performed by: PHYSICIAN ASSISTANT

## 2020-11-02 PROCEDURE — 87040 BLOOD CULTURE FOR BACTERIA: CPT

## 2020-11-02 RX ORDER — FLUOXETINE HYDROCHLORIDE 20 MG/1
20 CAPSULE ORAL DAILY
Status: DISCONTINUED | OUTPATIENT
Start: 2020-11-03 | End: 2020-11-07 | Stop reason: HOSPADM

## 2020-11-02 RX ORDER — DEXTROSE MONOHYDRATE 25 G/50ML
25-50 INJECTION, SOLUTION INTRAVENOUS AS NEEDED
Status: DISCONTINUED | OUTPATIENT
Start: 2020-11-02 | End: 2020-11-07 | Stop reason: HOSPADM

## 2020-11-02 RX ORDER — FLUOXETINE 10 MG/1
10 CAPSULE ORAL DAILY
Status: DISCONTINUED | OUTPATIENT
Start: 2020-11-03 | End: 2020-11-02 | Stop reason: SDUPTHER

## 2020-11-02 RX ORDER — PANTOPRAZOLE SODIUM 40 MG/1
40 TABLET, DELAYED RELEASE ORAL
Status: DISCONTINUED | OUTPATIENT
Start: 2020-11-03 | End: 2020-11-07 | Stop reason: HOSPADM

## 2020-11-02 RX ORDER — ACETAMINOPHEN 325 MG/1
650 TABLET ORAL
Status: DISCONTINUED | OUTPATIENT
Start: 2020-11-02 | End: 2020-11-07 | Stop reason: HOSPADM

## 2020-11-02 RX ORDER — SODIUM CHLORIDE 0.9 % (FLUSH) 0.9 %
5-10 SYRINGE (ML) INJECTION AS NEEDED
Status: DISCONTINUED | OUTPATIENT
Start: 2020-11-02 | End: 2020-11-07 | Stop reason: HOSPADM

## 2020-11-02 RX ORDER — BUSPIRONE HYDROCHLORIDE 7.5 MG/1
7.5 TABLET ORAL 3 TIMES DAILY
Status: DISCONTINUED | OUTPATIENT
Start: 2020-11-02 | End: 2020-11-02 | Stop reason: SDUPTHER

## 2020-11-02 RX ORDER — MONTELUKAST SODIUM 10 MG/1
10 TABLET ORAL DAILY
Status: DISCONTINUED | OUTPATIENT
Start: 2020-11-02 | End: 2020-11-07 | Stop reason: HOSPADM

## 2020-11-02 RX ORDER — ARFORMOTEROL TARTRATE 15 UG/2ML
15 SOLUTION RESPIRATORY (INHALATION)
Status: DISCONTINUED | OUTPATIENT
Start: 2020-11-02 | End: 2020-11-07 | Stop reason: HOSPADM

## 2020-11-02 RX ORDER — IPRATROPIUM BROMIDE AND ALBUTEROL SULFATE 2.5; .5 MG/3ML; MG/3ML
3 SOLUTION RESPIRATORY (INHALATION)
Status: DISCONTINUED | OUTPATIENT
Start: 2020-11-02 | End: 2020-11-07 | Stop reason: HOSPADM

## 2020-11-02 RX ORDER — LANOLIN ALCOHOL/MO/W.PET/CERES
5 CREAM (GRAM) TOPICAL
Status: DISCONTINUED | OUTPATIENT
Start: 2020-11-02 | End: 2020-11-07 | Stop reason: HOSPADM

## 2020-11-02 RX ORDER — SODIUM CHLORIDE 0.9 % (FLUSH) 0.9 %
5-40 SYRINGE (ML) INJECTION AS NEEDED
Status: DISCONTINUED | OUTPATIENT
Start: 2020-11-02 | End: 2020-11-07 | Stop reason: HOSPADM

## 2020-11-02 RX ORDER — SODIUM CHLORIDE 0.9 % (FLUSH) 0.9 %
5-40 SYRINGE (ML) INJECTION EVERY 8 HOURS
Status: DISCONTINUED | OUTPATIENT
Start: 2020-11-02 | End: 2020-11-07 | Stop reason: HOSPADM

## 2020-11-02 RX ORDER — FOLIC ACID 1 MG/1
1 TABLET ORAL DAILY
Status: DISCONTINUED | OUTPATIENT
Start: 2020-11-03 | End: 2020-11-07 | Stop reason: HOSPADM

## 2020-11-02 RX ORDER — LANOLIN ALCOHOL/MO/W.PET/CERES
5 CREAM (GRAM) TOPICAL
Status: DISCONTINUED | OUTPATIENT
Start: 2020-11-02 | End: 2020-11-02 | Stop reason: SDUPTHER

## 2020-11-02 RX ORDER — POTASSIUM CHLORIDE 20 MEQ/1
20 TABLET, EXTENDED RELEASE ORAL
Status: COMPLETED | OUTPATIENT
Start: 2020-11-02 | End: 2020-11-02

## 2020-11-02 RX ORDER — MAGNESIUM SULFATE 100 %
4 CRYSTALS MISCELLANEOUS AS NEEDED
Status: DISCONTINUED | OUTPATIENT
Start: 2020-11-02 | End: 2020-11-07 | Stop reason: HOSPADM

## 2020-11-02 RX ORDER — INSULIN GLARGINE 100 [IU]/ML
0.2 INJECTION, SOLUTION SUBCUTANEOUS
Status: DISCONTINUED | OUTPATIENT
Start: 2020-11-02 | End: 2020-11-03

## 2020-11-02 RX ORDER — INSULIN LISPRO 100 [IU]/ML
INJECTION, SOLUTION INTRAVENOUS; SUBCUTANEOUS
Status: DISCONTINUED | OUTPATIENT
Start: 2020-11-02 | End: 2020-11-07 | Stop reason: HOSPADM

## 2020-11-02 RX ORDER — ARFORMOTEROL TARTRATE 15 UG/2ML
15 SOLUTION RESPIRATORY (INHALATION) 2 TIMES DAILY
Status: DISCONTINUED | OUTPATIENT
Start: 2020-11-02 | End: 2020-11-02

## 2020-11-02 RX ORDER — HEPARIN SODIUM 5000 [USP'U]/ML
5000 INJECTION, SOLUTION INTRAVENOUS; SUBCUTANEOUS EVERY 8 HOURS
Status: DISCONTINUED | OUTPATIENT
Start: 2020-11-02 | End: 2020-11-07 | Stop reason: HOSPADM

## 2020-11-02 RX ORDER — BUSPIRONE HYDROCHLORIDE 7.5 MG/1
7.5 TABLET ORAL 2 TIMES DAILY
Status: DISCONTINUED | OUTPATIENT
Start: 2020-11-03 | End: 2020-11-07 | Stop reason: HOSPADM

## 2020-11-02 RX ADMIN — Medication 10 ML: at 16:57

## 2020-11-02 RX ADMIN — MONTELUKAST 10 MG: 10 TABLET, FILM COATED ORAL at 22:11

## 2020-11-02 RX ADMIN — IPRATROPIUM BROMIDE AND ALBUTEROL SULFATE 3 ML: .5; 3 SOLUTION RESPIRATORY (INHALATION) at 17:08

## 2020-11-02 RX ADMIN — ARFORMOTEROL TARTRATE 15 MCG: 15 SOLUTION RESPIRATORY (INHALATION) at 19:26

## 2020-11-02 RX ADMIN — Medication 4.5 MG: at 22:10

## 2020-11-02 RX ADMIN — Medication 10 ML: at 22:12

## 2020-11-02 RX ADMIN — SODIUM CHLORIDE 1000 ML: 900 INJECTION, SOLUTION INTRAVENOUS at 14:33

## 2020-11-02 RX ADMIN — AZITHROMYCIN MONOHYDRATE 500 MG: 500 INJECTION, POWDER, LYOPHILIZED, FOR SOLUTION INTRAVENOUS at 15:00

## 2020-11-02 RX ADMIN — POTASSIUM CHLORIDE 20 MEQ: 1500 TABLET, EXTENDED RELEASE ORAL at 15:27

## 2020-11-02 RX ADMIN — INSULIN GLARGINE 10 UNITS: 100 INJECTION, SOLUTION SUBCUTANEOUS at 22:10

## 2020-11-02 RX ADMIN — HEPARIN SODIUM 5000 UNITS: 5000 INJECTION INTRAVENOUS; SUBCUTANEOUS at 16:54

## 2020-11-02 RX ADMIN — SODIUM CHLORIDE 443 ML: 900 INJECTION, SOLUTION INTRAVENOUS at 14:36

## 2020-11-02 RX ADMIN — IPRATROPIUM BROMIDE AND ALBUTEROL SULFATE 3 ML: .5; 3 SOLUTION RESPIRATORY (INHALATION) at 19:26

## 2020-11-02 RX ADMIN — ACETAMINOPHEN 650 MG: 325 TABLET, FILM COATED ORAL at 18:19

## 2020-11-02 RX ADMIN — BUSPIRONE HYDROCHLORIDE 7.5 MG: 7.5 TABLET ORAL at 17:00

## 2020-11-02 RX ADMIN — CEFTRIAXONE 2 G: 2 INJECTION, POWDER, FOR SOLUTION INTRAMUSCULAR; INTRAVENOUS at 14:32

## 2020-11-02 RX ADMIN — HEPARIN SODIUM 5000 UNITS: 5000 INJECTION INTRAVENOUS; SUBCUTANEOUS at 23:05

## 2020-11-02 NOTE — H&P
Internal Medicine History and Physical  Note           NAME:  Sally Green   :   1950   MRN:  [id number]     PCP:  Rayne Jeter NP     Date/Time:  2020 2:47 PM      I hereby certify this patient for admission based upon medical necessity as noted below:        Assessment / plan :        # bilateral , multifocal   Pneumonia with SIRS  : iv Abx cover CAP. Urine antigen . Follow up on CXR for clearance. Blood CS  maintain oxygenation . Control cough    #   Immunosuppressed status due to Rheumatoid arthritis treatment      #  DM (diabetes mellitus) ([facility] (2020). Control blood sugar level. Provide SSI, hypoglycemia protocol and frequent Accu checks. Education . Diabetic Diet     #   Rheumatoid arthritis   follow with rheumatologist.     #   Ho COPD (chronic obstructive pulmonary disease) ([facility] (2020) . Home regimen . Fu with Dr Aurelio Adams      #  Hypokalemia (2020). Replete and trend. # Continue home regimen / Medications when appropriate. -DVT prophylaxis. - Code Status : FULL    -Other chronic medical problems as per past history. Further management depend on the course of the case and expanded data base. DISPO - pt to be admitted at this time for reasons addressed above, continued hospitalization for ongoing assessment and treatment indicated        Subjective:     CHIEF COMPLAINT: Productive cough for 5 days     HISTORY OF PRESENT ILLNESS:     Ms. Ayse Hodge is a 69 y.o.  female who is admitted for Pneumonia. Ms. Ayse Hodge with past medical history as noted below , presented to the Emergency Department today  complaining of cough and fever for 5-6 days/ Triage and ER notes noted. ER MD wanted to admit the patient to the hospital for further management and called hospitalist to facilitate this process. Patient know to this hospital with COPD admissions. Report been feverish with temp 102.  Lives with her  who is feeling well. Deny exposure to COVID or any risk of that. Her SOB been increasing last few days ( no wheezes) went to urgent care today as her PCP couldn't see her today , at urgent care her CXR showed pneumonia as been told and her wbc was up so they sent her to ER. She been treated with actemra by her rheumatologist who treated her with low dose. HR in ER on presentation was 111. Given CFTX and Zithro. She follow with Dr Ines Post. Given IVF. Sat 96 ./.. CPVID TEST  Negative at Urgent care. Past Medical History:   Diagnosis Date    Asthma     Chronic obstructive pulmonary disease ([facility]     Diabetes ([facility]     Heart murmur     Menopause         History reviewed. No pertinent surgical history. Social History     Tobacco Use    Smoking status: Former Smoker     Last attempt to quit:      Years since quittin.8    Smokeless tobacco: Never Used   Substance Use Topics    Alcohol use: Never     Frequency: Never        History reviewed. No pertinent family history. Allergies   Allergen Reactions    Sulfa (Sulfonamide Antibiotics) Diarrhea        Prior to Admission medications    Medication Sig Start Date End Date Taking? Authorizing Provider   predniSONE (DELTASONE) 10 mg tablet Take 6 tablets day 1, Take 5 tablets day 2  Take 4 tablets day 3, Take 3 tablets day 4  Take 2 tablets day 5, Take 1 tablet day 6  Then stop. 20   Chadd Todd MD   potassium chloride (K-DUR, KLOR-CON) 20 mEq tablet Take 2 Tabs by mouth two (2) times a day. 20   Jared Salinas MD   abatacept (ORENCIA) 250 mg injection by IntraVENous route once. Randa, MD Daria   methotrexate (RHEUMATREX) 2.5 mg tablet Take  by mouth. 5mg takes on     Daria Tolentino MD   folic acid (FOLVITE) 1 mg tablet Take  by mouth daily. Daria Tolentino MD   zoledronic acid (RECLAST) 5 mg/100 mL pgbk 5 mg by IntraVENous route once.  Once per year    Daria Tolentino MD   fluticasone propion-salmeteroL (ADVAIR DISKUS) 250-50 mcg/dose diskus inhaler Take 1 Puff by inhalation every twelve (12) hours. Daria Tolentino MD   montelukast (SINGULAIR) 10 mg tablet Take 10 mg by mouth daily. Daria Tolentino MD   albuterol (PROAIR HFA) 90 mcg/actuation inhaler Take  by inhalation. Daria Tolentino MD   loratadine (CLARITIN) 10 mg tablet Take 10 mg by mouth. Daria Tolentino MD   FLUoxetine (PROZAC) 20 mg capsule Take  by mouth daily. Daria Tolentino MD   omeprazole (PRILOSEC) 20 mg capsule Take 20 mg by mouth daily. Daria Tolentino MD   metFORMIN (GLUCOPHAGE) 500 mg tablet Take  by mouth two (2) times daily (with meals). Daria Tolentino MD   busPIRone (BUSPAR) 7.5 mg tablet Take 7.5 mg by mouth three (3) times daily. Daria Tolentino MD   melatonin 5 mg tablet Take  by mouth nightly.  10mg prn at night    Daria Tolentino MD       Review of Systems:  (bold if positive, otherwise negative), apart from what mentioned in HPI  Apart from above patient deny any other significant complain  Gen:  Weight gain, weight loss, fever, chills, fatigue  Eyes:  Visual changes, pain, conjunctivitis  ENT:  Sore throat, rhinorrhea, decreased hearing  CVS:  Palpitations, chest pain, dizziness, syncope, edema, PND  Pulm:  Cough, dyspnea, sputum, hemoptysis, wheezing  GI:  Abdominal pain, nausea, emesis, diarrhea, constipation, GERD, melena  :  Hematuria, incontinence, nocturia, dysuria, discharge  MS: arthritic pains Neck , hands, knees , feet due to RA.     weakness, swelling, arthritis  Skin:  Rash, erythema, abscess, wound, moles  Endo:  Heat intolerance, cold intolerance, weight gain, polyuria  Hem:  Enlarged nodes, bruising, bleeding, night sweats  Renal:  Edema, change in urine, flank pain  Neuro:  Numbness, tingling, weakness, seizure, headache, tremors       VITALS:    Vital signs reviewed; most recent are:    Visit Vitals  /71   Pulse (!) 103   Temp 98.6 °F (37 °C)   Resp 30   Ht 5' 2\" (1.575 m)   Wt 48.1 kg (106 lb)   SpO2 98%   BMI 19.39 kg/m² SpO2 Readings from Last 6 Encounters:   11/02/20 98%   08/19/20 91%   05/07/20 97%   02/17/20 91%            Intake/Output Summary (Last 24 hours) at 11/2/2020 1602  Last data filed at 11/2/2020 1545  Gross per 24 hour   Intake 443 ml   Output    Net 443 ml        Physical Exam:     Gen:  Appear stated age, Well-developed,   in no acute distress  HEENT:  Head atraumatic, normocephalic , hearing intact to voice, moist mucous membranes. Neck:  Trachea midline , No apparent JVD, Supple,  thyroid non-tender  Resp: R basal ronchi , minimal occasional wheezes L side post. Overall decrease AE. No accessory muscle use,Bilateral BS present   Card:    normal S1, S2 without Gallop . No Significant lower leg peripheral edema. Abd:  Soft, non-tender, non-distended, bowel sounds are present . Musc:  No cyanosis or clubbing. Skin: Warm and dry . No rashes or ulcers, skin turgor is good. Neuro:  Cranial nerves are grossly intact, no clear area of focal motor weakness, follows commands appropriately. Psych:  Good insight, oriented to person, place and time, alert. Labs: All Cardiac Markers in the last 24 hours: No results found for: CPK, CK, CKMMB, CKMB, RCK3, CKMBT, CKNDX, CKND1, MARTIN, TROPT, TROIQ, JO, TROPT, TNIPOC, BNP, BNPP    Recent Labs     11/02/20  1213   WBC 16.6*   HGB 9.6*   HCT 30.1*   *     Recent Labs     11/02/20  1213   *   K 3.3*      CO2 26   GLU 96   BUN 8   CREA 0.67   CA 9.0   ALB 2.7*   TBILI 0.3   ALT 12*     Lab Results   Component Value Date/Time    Glucose (POC) 150 (H) 08/19/2020 11:29 AM    Glucose (POC) 167 (H) 08/19/2020 07:42 AM     No results for input(s): PH, PCO2, PO2, HCO3, FIO2 in the last 72 hours. No results for input(s): INR, INREXT, INREXT in the last 72 hours. Xr Chest Port    Result Date: 11/2/2020  Impression: Right upper lobe and left lower lobe infiltrate, likely pneumonia. Recommend follow-up chest x-ray to document resolution.       Please refer to radiology reports. Risk of deterioration: high      Total time spent in the care of this patient: [facility] discussed with: Patient, Nursing Staff, >50% of time spent in counseling and coordination of care and ER MD      Discussed:  Care Plan       I have personally reviewed all pertinent labs, films and EKGs that have officially resulted. I reviewed available pertaining electronic documentation outlining the initial presentation as well as the emergency room physician's encounter. This document in whole or part of it has been produced using voice recognition software. Unrecognized errors in transcription may be present.     Attending Physician: Keturah Chaves MD

## 2020-11-02 NOTE — PROGRESS NOTES
Physical Exam  Constitutional:       Appearance: Normal appearance. Cardiovascular:      Rate and Rhythm: Tachycardia present. Skin:     General: Skin is warm and dry. Capillary Refill: Capillary refill takes less than 2 seconds. Coloration: Skin is not pale. Findings: No rash. Neurological:      Mental Status: She is alert. 1930  Bedside and Verbal shift change report given to Colt Gutierrez RN (oncoming nurse) by Mauro Vizcarra RN (offgoing nurse). Report included the following information SBAR, Kardex, ED Summary, Intake/Output, MAR, Accordion, Recent Results, Cardiac Rhythm SR/ST and Quality Measures.

## 2020-11-02 NOTE — PROGRESS NOTES
Respiratory Therapy Assessment Care Plan    Patient: Cira Reed 69 y.o. female 11/2/2020 5:12 PM    Pneumonia [J18.9]      Chest X-RAY:   Results from Hospital Encounter encounter on 11/02/20   XR CHEST PORT    Impression Impression:    Right upper lobe and left lower lobe infiltrate, likely pneumonia. Recommend  follow-up chest x-ray to document resolution. Results from [place] encounter on 08/14/20   XR CHEST PA LAT    Impression IMPRESSION:    Diffuse interstitial opacities with subtle right peripheral opacities. XR CHEST PORT    Impression IMPRESSION:  Progression of coarse interstitial opacities within both lungs,  right greater than left. Findings may represent progressing pulmonary fibrosis,  however correlate clinically to exclude superimposed atypical infection.           Vital Signs:   Visit Vitals  /71   Pulse (!) 103   Temp 98.6 °F (37 °C)   Resp 30   Ht 5' 2\" (1.575 m)   Wt 48.1 kg (106 lb)   SpO2 96%   Breastfeeding No   BMI 19.39 kg/m²         Indications for treatment: H/o COPD, PNA      Plan of care: Oneida Moreno BID        Goal: COPD maintenance

## 2020-11-02 NOTE — ED TRIAGE NOTES
Pt arrives from [facility] for suspected pneumonia, pt wbc elevated, and abnormal cxr. Pt with negative covid test today.

## 2020-11-02 NOTE — ED PROVIDER NOTES
EMERGENCY DEPARTMENT HISTORY AND PHYSICAL EXAM      Date: 11/2/2020  Patient Name: Marily Corona    History of Presenting Illness     Chief Complaint   Patient presents with    Cough       History Provided By: Patient    HPI: Marily Corona, 69 y.o. female PMHx significant for COPD, asthma, DM presents ambulatory to the ED with cc of productive cough and fever x5 days. T-max of 102. Patient reports intermittent SOB. Patient reports history of COPD. Is not on oxygen at home. Patient was seen at urgent care this morning with negative rapid Covid test.  Patient was told her chest x-ray showed possible multilobar pneumonia with leukocytosis. Denies known exposure to Covid. Pt reports SOB is worse when walking. There are no other complaints, changes, or physical findings at this time. PCP: Maddison Bee NP    No current facility-administered medications on file prior to encounter. Current Outpatient Medications on File Prior to Encounter   Medication Sig Dispense Refill    predniSONE (DELTASONE) 10 mg tablet Take 6 tablets day 1, Take 5 tablets day 2  Take 4 tablets day 3, Take 3 tablets day 4  Take 2 tablets day 5, Take 1 tablet day 6  Then stop. 21 Tab 0    potassium chloride (K-DUR, KLOR-CON) 20 mEq tablet Take 2 Tabs by mouth two (2) times a day. 2 Tab 0    abatacept (ORENCIA) 250 mg injection by IntraVENous route once.  methotrexate (RHEUMATREX) 2.5 mg tablet Take  by mouth. 5mg takes on fridays      folic acid (FOLVITE) 1 mg tablet Take  by mouth daily.  zoledronic acid (RECLAST) 5 mg/100 mL pgbk 5 mg by IntraVENous route once. Once per year      fluticasone propion-salmeteroL (ADVAIR DISKUS) 250-50 mcg/dose diskus inhaler Take 1 Puff by inhalation every twelve (12) hours.  montelukast (SINGULAIR) 10 mg tablet Take 10 mg by mouth daily.  albuterol (PROAIR HFA) 90 mcg/actuation inhaler Take  by inhalation.       loratadine (CLARITIN) 10 mg tablet Take 10 mg by mouth.      FLUoxetine (PROZAC) 20 mg capsule Take  by mouth daily.  omeprazole (PRILOSEC) 20 mg capsule Take 20 mg by mouth daily.  metFORMIN (GLUCOPHAGE) 500 mg tablet Take  by mouth two (2) times daily (with meals).  busPIRone (BUSPAR) 7.5 mg tablet Take 7.5 mg by mouth three (3) times daily.  melatonin 5 mg tablet Take  by mouth nightly. 10mg prn at night         Past History     Past Medical History:  Past Medical History:   Diagnosis Date    Asthma     Chronic obstructive pulmonary disease (HCC)     Diabetes ([facility]     Heart murmur     Menopause        Past Surgical History:  History reviewed. No pertinent surgical history. Family History:  History reviewed. No pertinent family history. Social History:  Social History     Tobacco Use    Smoking status: Former Smoker     Last attempt to quit:      Years since quittin.8    Smokeless tobacco: Never Used   Substance Use Topics    Alcohol use: Never     Frequency: Never    Drug use: Never       Allergies: Allergies   Allergen Reactions    Sulfa (Sulfonamide Antibiotics) Diarrhea         Review of Systems   Review of Systems   Constitutional: Positive for fever. Negative for chills. Respiratory: Positive for cough. Negative for shortness of breath. Cardiovascular: Negative for chest pain. Gastrointestinal: Negative for abdominal pain, nausea and vomiting. Genitourinary: Negative for flank pain. Musculoskeletal: Negative for back pain and myalgias. Skin: Negative for color change, pallor, rash and wound. Neurological: Negative for dizziness, weakness and light-headedness. All other systems reviewed and are negative. Physical Exam   Physical Exam  Vitals signs and nursing note reviewed. Constitutional:       General: She is not in acute distress. Appearance: She is well-developed. Comments: Patient well-appearing in NAD   HENT:      Head: Normocephalic and atraumatic.    Eyes: Conjunctiva/sclera: Conjunctivae normal.   Cardiovascular:      Rate and Rhythm: Normal rate and regular rhythm. Heart sounds: Normal heart sounds. Pulmonary:      Effort: Pulmonary effort is normal. No respiratory distress. Breath sounds: Normal breath sounds. Comments: Decreased lung sounds to left lower lobe  Not working to breathe    Abdominal:      General: Bowel sounds are normal. There is no distension. Palpations: Abdomen is soft. Musculoskeletal: Normal range of motion. Skin:     General: Skin is warm. Findings: No rash. Neurological:      Mental Status: She is alert and oriented to person, place, and time. Psychiatric:         Behavior: Behavior normal.         Diagnostic Study Results     Labs -     Recent Results (from the past 12 hour(s))   METABOLIC PANEL, COMPREHENSIVE    Collection Time: 11/02/20 12:13 PM   Result Value Ref Range    Sodium 135 (L) 136 - 145 mmol/L    Potassium 3.3 (L) 3.5 - 5.5 mmol/L    Chloride 104 100 - 111 mmol/L    CO2 26 21 - 32 mmol/L    Anion gap 5 3.0 - 18 mmol/L    Glucose 96 74 - 99 mg/dL    BUN 8 7.0 - 18 MG/DL    Creatinine 0.67 0.6 - 1.3 MG/DL    BUN/Creatinine ratio 12 12 - 20      GFR est AA >60 >60 ml/min/1.73m2    GFR est non-AA >60 >60 ml/min/1.73m2    Calcium 9.0 8.5 - 10.1 MG/DL    Bilirubin, total 0.3 0.2 - 1.0 MG/DL    ALT (SGPT) 12 (L) 13 - 56 U/L    AST (SGOT) 14 10 - 38 U/L    Alk.  phosphatase 72 45 - 117 U/L    Protein, total 6.9 6.4 - 8.2 g/dL    Albumin 2.7 (L) 3.4 - 5.0 g/dL    Globulin 4.2 (H) 2.0 - 4.0 g/dL    A-G Ratio 0.6 (L) 0.8 - 1.7     CBC WITH AUTOMATED DIFF    Collection Time: 11/02/20 12:13 PM   Result Value Ref Range    WBC 16.6 (H) 4.6 - 13.2 K/uL    RBC 3.31 (L) 4.20 - 5.30 M/uL    HGB 9.6 (L) 12.0 - 16.0 g/dL    HCT 30.1 (L) 35.0 - 45.0 %    MCV 90.9 74.0 - 97.0 FL    MCH 29.0 24.0 - 34.0 PG    MCHC 31.9 31.0 - 37.0 g/dL    RDW 15.0 (H) 11.6 - 14.5 %    PLATELET 869 (H) 674 - 420 K/uL    MPV 8.8 (L) 9.2 - 11.8 FL    NEUTROPHILS 73 40 - 73 %    LYMPHOCYTES 14 (L) 21 - 52 %    MONOCYTES 12 (H) 3 - 10 %    EOSINOPHILS 1 0 - 5 %    BASOPHILS 0 0 - 2 %    ABS. NEUTROPHILS 12.1 (H) 1.8 - 8.0 K/UL    ABS. LYMPHOCYTES 2.2 0.9 - 3.6 K/UL    ABS. MONOCYTES 2.0 (H) 0.05 - 1.2 K/UL    ABS. EOSINOPHILS 0.2 0.0 - 0.4 K/UL    ABS. BASOPHILS 0.1 0.0 - 0.1 K/UL    DF AUTOMATED     EKG, 12 LEAD, INITIAL    Collection Time: 11/02/20  1:46 PM   Result Value Ref Range    Ventricular Rate 104 BPM    Atrial Rate 104 BPM    P-R Interval 126 ms    QRS Duration 76 ms    Q-T Interval 342 ms    QTC Calculation (Bezet) 449 ms    Calculated P Axis 72 degrees    Calculated R Axis 62 degrees    Calculated T Axis 52 degrees    Diagnosis       Sinus tachycardia  Nonspecific ST abnormality  Abnormal ECG  When compared with ECG of 14-AUG-2020 13:54,  No significant change was found     POC LACTIC ACID    Collection Time: 11/02/20  2:14 PM   Result Value Ref Range    Lactic Acid (POC) 1.12 0.40 - 2.00 mmol/L       Radiologic Studies -   XR CHEST PORT   Final Result   Impression:      Right upper lobe and left lower lobe infiltrate, likely pneumonia. Recommend   follow-up chest x-ray to document resolution. CT Results  (Last 48 hours)    None        CXR Results  (Last 48 hours)               11/02/20 1341  XR CHEST PORT Final result    Impression:  Impression:       Right upper lobe and left lower lobe infiltrate, likely pneumonia. Recommend   follow-up chest x-ray to document resolution. Narrative:  CHEST AP PORTABLE       Indication: Pneumonia, leukocytosis. Comparison: X-ray 08/17/2020. Findings: There is infiltrate in the right upper lobe and left lung base. Remaining lungs appear clear. The cardiac silhouette and pulmonary vascularity   appear within normal limits. No evidence for pneumothorax or pleural effusion. Medical Decision Making   I am the first provider for this patient.     I reviewed the vital signs, available nursing notes, past medical history, past surgical history, family history and social history. Vital Signs-Reviewed the patient's vital signs. Patient Vitals for the past 12 hrs:   Temp Pulse Resp BP SpO2   11/02/20 1306 98.6 °F (37 °C) (!) 110 20 (!) 149/93 96 %         EKG interpretation: (Preliminary)  Rhythm: sinus tachycardia; and regular . Rate (approx.): 14; Axis: normal; ME interval: normal; QRS interval: normal ; ST/T wave: normal; Other findings: normal.    No acute ischemic changes. Records Reviewed: Nursing Notes, Old Medical Records and Previous electrocardiograms    Provider Notes (Medical Decision Making):   DDx: PNA, COVID, URI, COPD exacerbation, Sepsis    68 yo F who presents with productive cough and fever x 5 days. Hx COPD. Negative rapid COVID at urgent care earlier today. CXR shows multilobe PNA. Leukocytosis. Normal lactic acid. Patient treated for sepsis with no signs of end organ damage. Treated with azithro and rocephin in ED. Pt admitted for mulilobe pna. ED Course:   Initial assessment performed. The patients presenting problems have been discussed, and they are in agreement with the care plan formulated and outlined with them. I have encouraged them to ask questions as they arise throughout their visit. 200  Spoke with Dr Mery Grady, consult for hospitalist. Discussed pt's presentation, leukocytosis, CXR, hx COPD and negative COVID swab today. He agreed to admit pt to inpatient hospitalist services. Lactic acid normal. No repeat ordered. Disposition:  Admitted    PLAN:  1. Current Discharge Medication List        2. Follow-up Information    None       Return to ED if worse     Diagnosis     Clinical Impression:   1. Multifocal pneumonia    2. Hypokalemia    3.  Sepsis without acute organ dysfunction, due to unspecified organism [facility])        Attestations:    SOFIA Nicole    Please note that this dictation was completed with Dragon, the computer voice recognition software. Quite often unanticipated grammatical, syntax, homophones, and other interpretive errors are inadvertently transcribed by the computer software. Please disregard these errors. Please excuse any errors that have escaped final proofreading. Thank you.

## 2020-11-02 NOTE — PROGRESS NOTES
1405  Received pt to room 2201 from ER aboard stretcher, ambulated to bed without difficulty, alert and oriented x4, afebrile,, IV infusing. States has urinary frequency, OOB to BR with supervision, no evidence of mobility impairment; dyspnea on exertion noted. .  Skin clear, tread socks on.

## 2020-11-03 ENCOUNTER — APPOINTMENT (OUTPATIENT)
Dept: CT IMAGING | Age: 70
DRG: 193 | End: 2020-11-03
Attending: INTERNAL MEDICINE
Payer: MEDICARE

## 2020-11-03 LAB
ANION GAP SERPL CALC-SCNC: 7 MMOL/L (ref 3–18)
ATRIAL RATE: 104 BPM
BASOPHILS # BLD: 0.1 K/UL (ref 0–0.1)
BASOPHILS NFR BLD: 1 % (ref 0–2)
BUN SERPL-MCNC: 5 MG/DL (ref 7–18)
BUN/CREAT SERPL: 9 (ref 12–20)
CALCIUM SERPL-MCNC: 8.5 MG/DL (ref 8.5–10.1)
CALCULATED P AXIS, ECG09: 72 DEGREES
CALCULATED R AXIS, ECG10: 62 DEGREES
CALCULATED T AXIS, ECG11: 52 DEGREES
CHLORIDE SERPL-SCNC: 108 MMOL/L (ref 100–111)
CO2 SERPL-SCNC: 26 MMOL/L (ref 21–32)
CREAT SERPL-MCNC: 0.54 MG/DL (ref 0.6–1.3)
DIAGNOSIS, 93000: NORMAL
DIFFERENTIAL METHOD BLD: ABNORMAL
EOSINOPHIL # BLD: 0.3 K/UL (ref 0–0.4)
EOSINOPHIL NFR BLD: 3 % (ref 0–5)
ERYTHROCYTE [DISTWIDTH] IN BLOOD BY AUTOMATED COUNT: 15.2 % (ref 11.6–14.5)
GLUCOSE BLD STRIP.AUTO-MCNC: 111 MG/DL (ref 70–110)
GLUCOSE BLD STRIP.AUTO-MCNC: 113 MG/DL (ref 70–110)
GLUCOSE BLD STRIP.AUTO-MCNC: 150 MG/DL (ref 70–110)
GLUCOSE BLD STRIP.AUTO-MCNC: 85 MG/DL (ref 70–110)
GLUCOSE SERPL-MCNC: 104 MG/DL (ref 74–99)
HCT VFR BLD AUTO: 28.6 % (ref 35–45)
HGB BLD-MCNC: 9.1 G/DL (ref 12–16)
L PNEUMO AG UR QL IA: NEGATIVE
LYMPHOCYTES # BLD: 1.9 K/UL (ref 0.9–3.6)
LYMPHOCYTES NFR BLD: 15 % (ref 21–52)
MAGNESIUM SERPL-MCNC: 1.1 MG/DL (ref 1.6–2.6)
MCH RBC QN AUTO: 28.9 PG (ref 24–34)
MCHC RBC AUTO-ENTMCNC: 31.8 G/DL (ref 31–37)
MCV RBC AUTO: 90.8 FL (ref 74–97)
MONOCYTES # BLD: 2.2 K/UL (ref 0.05–1.2)
MONOCYTES NFR BLD: 18 % (ref 3–10)
NEUTS SEG # BLD: 7.9 K/UL (ref 1.8–8)
NEUTS SEG NFR BLD: 63 % (ref 40–73)
P-R INTERVAL, ECG05: 126 MS
PHOSPHATE SERPL-MCNC: 3.8 MG/DL (ref 2.5–4.9)
PLATELET # BLD AUTO: 560 K/UL (ref 135–420)
PMV BLD AUTO: 8.8 FL (ref 9.2–11.8)
POTASSIUM SERPL-SCNC: 3.8 MMOL/L (ref 3.5–5.5)
Q-T INTERVAL, ECG07: 342 MS
QRS DURATION, ECG06: 76 MS
QTC CALCULATION (BEZET), ECG08: 449 MS
RBC # BLD AUTO: 3.15 M/UL (ref 4.2–5.3)
S PNEUM AG UR QL: NEGATIVE
SODIUM SERPL-SCNC: 141 MMOL/L (ref 136–145)
VENTRICULAR RATE, ECG03: 104 BPM
WBC # BLD AUTO: 12.3 K/UL (ref 4.6–13.2)

## 2020-11-03 PROCEDURE — 94640 AIRWAY INHALATION TREATMENT: CPT

## 2020-11-03 PROCEDURE — 74011250636 HC RX REV CODE- 250/636: Performed by: INTERNAL MEDICINE

## 2020-11-03 PROCEDURE — 65660000000 HC RM CCU STEPDOWN

## 2020-11-03 PROCEDURE — 85025 COMPLETE CBC W/AUTO DIFF WBC: CPT

## 2020-11-03 PROCEDURE — 83735 ASSAY OF MAGNESIUM: CPT

## 2020-11-03 PROCEDURE — 71250 CT THORAX DX C-: CPT

## 2020-11-03 PROCEDURE — 84100 ASSAY OF PHOSPHORUS: CPT

## 2020-11-03 PROCEDURE — 36415 COLL VENOUS BLD VENIPUNCTURE: CPT

## 2020-11-03 PROCEDURE — 74011250637 HC RX REV CODE- 250/637: Performed by: INTERNAL MEDICINE

## 2020-11-03 PROCEDURE — 74011250637 HC RX REV CODE- 250/637: Performed by: HOSPITALIST

## 2020-11-03 PROCEDURE — 74011000250 HC RX REV CODE- 250: Performed by: INTERNAL MEDICINE

## 2020-11-03 PROCEDURE — 94761 N-INVAS EAR/PLS OXIMETRY MLT: CPT

## 2020-11-03 PROCEDURE — 82962 GLUCOSE BLOOD TEST: CPT

## 2020-11-03 PROCEDURE — 80048 BASIC METABOLIC PNL TOTAL CA: CPT

## 2020-11-03 PROCEDURE — 74011636637 HC RX REV CODE- 636/637: Performed by: INTERNAL MEDICINE

## 2020-11-03 PROCEDURE — 74011000258 HC RX REV CODE- 258: Performed by: INTERNAL MEDICINE

## 2020-11-03 RX ORDER — MAGNESIUM SULFATE HEPTAHYDRATE 40 MG/ML
2 INJECTION, SOLUTION INTRAVENOUS ONCE
Status: COMPLETED | OUTPATIENT
Start: 2020-11-03 | End: 2020-11-03

## 2020-11-03 RX ORDER — INSULIN GLARGINE 100 [IU]/ML
8 INJECTION, SOLUTION SUBCUTANEOUS
Status: DISCONTINUED | OUTPATIENT
Start: 2020-11-03 | End: 2020-11-07 | Stop reason: HOSPADM

## 2020-11-03 RX ORDER — LOSARTAN POTASSIUM 50 MG/1
100 TABLET ORAL DAILY
Status: DISCONTINUED | OUTPATIENT
Start: 2020-11-03 | End: 2020-11-07 | Stop reason: HOSPADM

## 2020-11-03 RX ORDER — HYDROCODONE POLISTIREX AND CHLORPHENIRAMINE POLISTIREX 10; 8 MG/5ML; MG/5ML
5 SUSPENSION, EXTENDED RELEASE ORAL
Status: DISCONTINUED | OUTPATIENT
Start: 2020-11-03 | End: 2020-11-07 | Stop reason: HOSPADM

## 2020-11-03 RX ADMIN — Medication 10 ML: at 05:22

## 2020-11-03 RX ADMIN — IPRATROPIUM BROMIDE AND ALBUTEROL SULFATE 3 ML: .5; 3 SOLUTION RESPIRATORY (INHALATION) at 16:13

## 2020-11-03 RX ADMIN — BUSPIRONE HYDROCHLORIDE 7.5 MG: 7.5 TABLET ORAL at 08:27

## 2020-11-03 RX ADMIN — INSULIN GLARGINE 8 UNITS: 100 INJECTION, SOLUTION SUBCUTANEOUS at 21:32

## 2020-11-03 RX ADMIN — MAGNESIUM SULFATE HEPTAHYDRATE 2 G: 40 INJECTION, SOLUTION INTRAVENOUS at 12:11

## 2020-11-03 RX ADMIN — PANTOPRAZOLE SODIUM 40 MG: 40 TABLET, DELAYED RELEASE ORAL at 08:27

## 2020-11-03 RX ADMIN — HEPARIN SODIUM 5000 UNITS: 5000 INJECTION INTRAVENOUS; SUBCUTANEOUS at 23:04

## 2020-11-03 RX ADMIN — AZITHROMYCIN MONOHYDRATE 500 MG: 500 INJECTION, POWDER, LYOPHILIZED, FOR SOLUTION INTRAVENOUS at 13:23

## 2020-11-03 RX ADMIN — MONTELUKAST 10 MG: 10 TABLET, FILM COATED ORAL at 21:30

## 2020-11-03 RX ADMIN — INSULIN LISPRO 2 UNITS: 100 INJECTION, SOLUTION INTRAVENOUS; SUBCUTANEOUS at 21:31

## 2020-11-03 RX ADMIN — ARFORMOTEROL TARTRATE 15 MCG: 15 SOLUTION RESPIRATORY (INHALATION) at 21:22

## 2020-11-03 RX ADMIN — FLUOXETINE 20 MG: 20 CAPSULE ORAL at 08:27

## 2020-11-03 RX ADMIN — Medication 4.5 MG: at 21:30

## 2020-11-03 RX ADMIN — IPRATROPIUM BROMIDE AND ALBUTEROL SULFATE 3 ML: .5; 3 SOLUTION RESPIRATORY (INHALATION) at 07:29

## 2020-11-03 RX ADMIN — ACETAMINOPHEN 650 MG: 325 TABLET, FILM COATED ORAL at 08:25

## 2020-11-03 RX ADMIN — IPRATROPIUM BROMIDE AND ALBUTEROL SULFATE 3 ML: .5; 3 SOLUTION RESPIRATORY (INHALATION) at 21:22

## 2020-11-03 RX ADMIN — ACETAMINOPHEN 650 MG: 325 TABLET, FILM COATED ORAL at 15:25

## 2020-11-03 RX ADMIN — BUSPIRONE HYDROCHLORIDE 7.5 MG: 7.5 TABLET ORAL at 17:17

## 2020-11-03 RX ADMIN — ARFORMOTEROL TARTRATE 15 MCG: 15 SOLUTION RESPIRATORY (INHALATION) at 07:29

## 2020-11-03 RX ADMIN — FOLIC ACID 1 MG: 1 TABLET ORAL at 08:26

## 2020-11-03 RX ADMIN — IPRATROPIUM BROMIDE AND ALBUTEROL SULFATE 3 ML: .5; 3 SOLUTION RESPIRATORY (INHALATION) at 11:44

## 2020-11-03 RX ADMIN — Medication 10 ML: at 13:24

## 2020-11-03 RX ADMIN — Medication 10 ML: at 22:07

## 2020-11-03 RX ADMIN — HEPARIN SODIUM 5000 UNITS: 5000 INJECTION INTRAVENOUS; SUBCUTANEOUS at 16:46

## 2020-11-03 RX ADMIN — CEFTRIAXONE 2 G: 2 INJECTION, POWDER, FOR SOLUTION INTRAMUSCULAR; INTRAVENOUS at 13:23

## 2020-11-03 RX ADMIN — LOSARTAN POTASSIUM 100 MG: 50 TABLET ORAL at 22:06

## 2020-11-03 RX ADMIN — ACETAMINOPHEN 650 MG: 325 TABLET, FILM COATED ORAL at 21:33

## 2020-11-03 RX ADMIN — HEPARIN SODIUM 5000 UNITS: 5000 INJECTION INTRAVENOUS; SUBCUTANEOUS at 08:27

## 2020-11-03 NOTE — PROGRESS NOTES
Internal Medicine Progress Note        NAME: Akiko Pandya   :  1950  MRM:  [id number]    Date/Time: 11/3/2020        ASSESSMENT/PLAN:    Principal Problem:    Pneumonia (2020)    Active Problems:    Immunosuppressed status ([facility] (2/15/2020)      DM (diabetes mellitus) ([facility] (2020)      Rheumatoid arthritis ([facility] (2020)      SIRS (systemic inflammatory response syndrome) ([facility] (2020)      COPD (chronic obstructive pulmonary disease) ([facility] (2020)      Hypokalemia (2020)          # Bilateral , multifocal   Pneumonia with SIRS  : iv Abx cover CAP. Urine antigen . Follow up on CXR for clearance. Blood CS  maintain oxygenation . Control cough  CT chest report noted, no mention of possible picture of COVID.      #   Immunosuppressed status due to Rheumatoid arthritis treatment       #  DM (diabetes mellitus) ([facility] (2020). Control blood sugar level. Provide SSI, hypoglycemia protocol and frequent Accu checks. Education . Diabetic Diet      #   Rheumatoid arthritis   follow with rheumatologist.     # Arthralgia multiple sites. Control pains      #   Ho COPD (chronic obstructive pulmonary disease) ([facility] (2020) . Home regimen . Fu with Dr Rachelle Flores       #  Hypokalemia (2020). Replete and trend. # Hypomagnesemia. Replete         # Continue home regimen / Medications when appropriate.      -DVT prophylaxis.      - Code Status : FULL    None    Lab Review:     Recent Labs     205 20  1213   WBC 12.3 16.6*   HGB 9.1* 9.6*   HCT 28.6* 30.1*   * 581*     Recent Labs     20  0425 20  1213    135*   K 3.8 3.3*    104   CO2 26 26   * 96   BUN 5* 8   CREA 0.54* 0.67   CA 8.5 9.0   MG 1.1*  --    PHOS 3.8  --    ALB  --  2.7*   TBILI  --  0.3   ALT  --  12*     Lab Results   Component Value Date/Time    Glucose (POC) 113 (H) 2020 08:35 AM    Glucose (POC) 115 (H) 2020 10:01 PM    Glucose (POC) 91 2020 05:03 PM    Glucose (POC) 150 (H) 08/19/2020 11:29 AM    Glucose (POC) 167 (H) 08/19/2020 07:42 AM     No results for input(s): PH, PCO2, PO2, HCO3, FIO2 in the last 72 hours. No results for input(s): INR, INREXT in the last 72 hours. No results found for: SDES  Lab Results   Component Value Date/Time    Culture result: NO GROWTH AFTER 14 HOURS 11/02/2020 02:25 PM    Culture result: NO GROWTH AFTER 14 HOURS 11/02/2020 02:12 PM    Culture result: NO GROWTH 6 DAYS 08/14/2020 01:30 PM       All Cardiac Markers in the last 24 hours: No results found for: CPK, CK, CKMMB, CKMB, RCK3, CKMBT, CKNDX, CKND1, MARTIN, TROPT, TROIQ, JO, TROPT, TNIPOC, BNP, BNPP        Intervals noted   Pt s/e @ bedside     Subjective:     Chief Complaint:      Still SOB and not feeling well but better than before admission  Cough. Arthralgia bad from her RA     ROS:  No CP/ N/V/D/Pains/Bleeding/ acute joint swelling/rash/itching/fever/chills. Tolerating Diet                Objective:     Vitals:  Last 24hrs VS reviewed since prior progress note. Most recent are:    Visit Vitals  BP (!) 142/76   Pulse 100   Temp 97.8 °F (36.6 °C)   Resp 20   Ht 5' 2\" (1.575 m)   Wt 48.1 kg (106 lb)   SpO2 92%   Breastfeeding No   BMI 19.39 kg/m²     SpO2 Readings from Last 6 Encounters:   11/03/20 92%   08/19/20 91%   05/07/20 97%   02/17/20 91%            Intake/Output Summary (Last 24 hours) at 11/3/2020 1119  Last data filed at 11/3/2020 3156  Gross per 24 hour   Intake 1183 ml   Output 440 ml   Net 743 ml          Physical Exam:   Gen:  Appear stated age, Well-developed,   in no acute distress  HEENT:  Head atraumatic, normocephalic , hearing intact to voice, moist mucous membranes. Neck:   Trachea midline , No apparent JVD, Supple,  thyroid non-tender  Resp: better AE, R basal ronchi ,   No accessory muscle use,Bilateral BS present   Card:    normal S1, S2 without Gallop . No Significant lower leg peripheral edema.   Abd:  Soft, non-tender, non-distended, bowel sounds are present . Musc:  No cyanosis or clubbing. Skin:   Warm and dry . No rashes or ulcers, skin turgor is good. Neuro:  Cranial nerves are grossly intact, no clear area of focal motor weakness, follows commands appropriately. Psych:  Good insight, oriented to person, place and time, alert.     Medications Reviewed: (see below)    Lab Data Reviewed: (see below)    ______________________________________________________________________    Medications:     Current Facility-Administered Medications   Medication Dose Route Frequency    magnesium sulfate 2 g/50 ml IVPB (premix or compounded)  2 g IntraVENous ONCE    sodium chloride (NS) flush 5-10 mL  5-10 mL IntraVENous PRN    cefTRIAXone (ROCEPHIN) 2 g in 0.9% sodium chloride (MBP/ADV) 50 mL MBP  2 g IntraVENous Q24H    azithromycin (ZITHROMAX) 500 mg in 0.9% sodium chloride 250 mL IVPB  500 mg IntraVENous Q24H    albuterol-ipratropium (DUO-NEB) 2.5 MG-0.5 MG/3 ML  3 mL Nebulization QID RT    folic acid (FOLVITE) tablet 1 mg  1 mg Oral DAILY    montelukast (SINGULAIR) tablet 10 mg  10 mg Oral DAILY    pantoprazole (PROTONIX) tablet 40 mg  40 mg Oral ACB    sodium chloride (NS) flush 5-40 mL  5-40 mL IntraVENous Q8H    sodium chloride (NS) flush 5-40 mL  5-40 mL IntraVENous PRN    heparin (porcine) injection 5,000 Units  5,000 Units SubCUTAneous Q8H    insulin glargine (LANTUS) injection 10 Units  0.2 Units/kg SubCUTAneous QHS    insulin lispro (HUMALOG) injection   SubCUTAneous AC&HS    glucose chewable tablet 16 g  4 Tab Oral PRN    glucagon (GLUCAGEN) injection 1 mg  1 mg IntraMUSCular PRN    dextrose (D50) infusion 12.5-25 g  25-50 mL IntraVENous PRN    arformoteroL (BROVANA) neb solution 15 mcg  15 mcg Nebulization BID RT    acetaminophen (TYLENOL) tablet 650 mg  650 mg Oral Q6H PRN    FLUoxetine (PROzac) capsule 20 mg  20 mg Oral DAILY    busPIRone (BUSPAR) tablet 7.5 mg  7.5 mg Oral BID    melatonin tablet 4.5 mg 4.5 mg Oral QHS          Total time spent with patient: 35 minutes                  Care Plan discussed with: Patient, Care Manager, Nursing Staff and >50% of time spent in counseling and coordination of care    Discussed:  Care Plan    Prophylaxis:  Hep SQ    Disposition:  Home w/Family           This document in whole or part of it has been produced using voice recognition software. Unrecognized errors in transcription may be present.     Attending Physician: Antonina Le MD

## 2020-11-03 NOTE — PROGRESS NOTES
Problem: Discharge Planning  Goal: *Discharge to safe environment  Outcome: Progressing Towards Goal     Reason for Admission:   COPD (chronic obstructive pulmonary disease)                  RUR Score:   24%               PCP: First and Last name:  Guerline Levy NP   Name of Practice:    Are you a current patient: Yes/No: yes   Approximate date of last visit:    Can you participate in a virtual visit if needed:     Do you (patient/family) have any concerns for transition/discharge? Not at time              Plan for utilizing home health:   [facility]    Current Advanced Directive/Advance Care Plan: On file            Transition of Care Plan:      Interviewed patient. Verified demographics listed on face sheet with patient; all information correct. Has medicare and AARP Patient lives in single family with spouse . Patient independent with ADLs prior to admission. No use of DME prior to admission. Discharge plan is Home and [facility]      Patient has designated ______spouse__________________ to participate in his/her discharge plan and to receive any needed information. [address]   [phone]      Care Management Interventions  PCP Verified by CM:  Yes  Mode of Transport at Discharge: Self  Transition of Care Consult (CM Consult): Discharge Planning  Current Support Network: Lives with Spouse, Own Home  Confirm Follow Up Transport: Self  Discharge Location  Discharge Placement: Home

## 2020-11-03 NOTE — DIABETES MGMT
Initial Nutrition Assessment and Glycemic Control Plan of Care    Type and Reason for Visit: Initial    Nutrition Recommendations/Plan:   1. Continue CHO consistent diet  2. Lower Lantus to 8 units/24 hours (blood glucose readings trending from  mg/dL with 10 units Lantus last PM). Nutrition Assessment:     Pt admitted with COPD, SIRS, immunosuppressed status, hypokalemia (resolved). PMHx includes T2DM, RA. Pt with low BMI and recent history of weight loss but per pt she has regained some of it back with current net weight loss of 6 lbs since 8/2020 (5% weight loss x 3 months - not significant). Current po intake appears to be adequate per meal time observation (has taken ~ 75% lunch meal and is still eating). Malnutrition Assessment:  Malnutrition Status: At risk for malnutrition (specify) - hx of unintentional weight loss       Nutrition History and Allergies: NKFA's. Reports prior weight loss but has been gaining some of it back. Usual weight was 112 lbs and it went down to 103 lbs during last hospitalization in August 2020 per pt. She reports her current appetite is good. Diabetes Management:   Pt with good glycemic control PTA and since admission. Recent blood glucose:    11/3:  113, 85  11/2:  91, 115 - received 10 units Lantus   Within target range (non-ICU: <140; ICU<180):  Yes         Current Insulin regimen:   10 units Lantus and corrective lispro, normal insulin sensitivity ACHS  Home medication/insulin regimen: metformin 500 mg BID  HbA1c: 6.0% (8/15/20) equivalent  to ave Blood Glucose of 120   mg/dl over prior 2-3 months  Adequate glycemic control PTA:   Yes       Estimated Daily Nutrient Needs:  Energy (kcal): 1424; Weight Used for Energy Requirements:    Protein (g): 58; Weight Used for Protein Requirements:    Fluid (ml/day): 1500; Weight Used for Fluid Requirements:      Nutrition Related Findings: Receiving Folvite.      Wounds:    None       Current Nutrition Therapies:  DIET DIABETIC CONSISTENT CARB Regular     Additional Caloric Sources: none     Anthropometric Measures:  · Height:  5' 2\" (157.5 cm)  · Current Body Wt:  48.1 kg (106 lb 0.7 oz)(11/2/20)       · Usual Body Wt: 112 lbs        · Ideal Body Wt:  110 lbs   :      · Adjusted Body Weight:   ; Weight Adjustment for:     · BMI Category:  Underweight (BMI less than 22) age over 72       Nutrition Diagnosis:   · Underweight related to inadequate protein-energy intake as evidenced by BMI(19.4 kg/m2)    · Altered GI function related to endocrine dysfunction as evidenced by other (specify)(A1C - 6.0%; use of oral hypoglycemic agents PTA)    Nutrition Interventions:   Food and/or Nutrient Delivery: Continue current diet  Nutrition Education and Counseling: No recommendations at this time  Coordination of Nutrition Care: No recommendation at this time    Goals:  Blood glucose will be in target range (70 to 180 mg/dL) within 3 days. PO intake will meet >75% of patient estimated nutritional needs within the next 7 days.         Nutrition Monitoring and Evaluation:   Food/Nutrient Intake Outcomes: Food and nutrient intake  Physical Signs/Symptoms Outcomes: Biochemical data, Meal time behavior, Weight    Discharge Planning:    Continue current diet     Electronically signed by Jaylan John RD on 11/3/2020 at 1:15 PM  Contact:   Jaylan John, [address] [place] [address], [facility]   Office:  [address] [place] [address] Pager:  [phone]

## 2020-11-03 NOTE — PROGRESS NOTES
-- Bedside, Verbal and Written shift change report given to [facility] (oncoming nurse) by Kusum Miranda RN (offgoing nurse). Report included the following information SBAR, Kardex, Intake/Output, MAR and Recent Results. 2110-- PM medications administered, pt tolerated with ease, will continue to monitor. 2305 -- Shift reassessment, pt condition unchanged, will continue to monitor. 6792--  Shift reassessment, pt sleeping between care, will continue to monitor. -- Bedside, Verbal and Written shift change report given to -Elias Cabezas RN (oncoming nurse) by Soo Hirsch RN (offgoing nurse). Report included the following information SBAR, Kardex, Intake/Output, MAR and Recent Results. Skin assessment completed.

## 2020-11-03 NOTE — PROGRESS NOTES
Respiratory Therapy Assessment Care Plan    Patient: Samuel June 69 y.o. female 11/3/2020 4:31 PM    Pneumonia [J18.9]      Chest X-RAY:   Results from Hospital Encounter encounter on 11/02/20   XR CHEST PORT    Impression Impression:    Right upper lobe and left lower lobe infiltrate, likely pneumonia. Recommend  follow-up chest x-ray to document resolution. Results from [place] encounter on 08/14/20   XR CHEST PA LAT    Impression IMPRESSION:    Diffuse interstitial opacities with subtle right peripheral opacities. XR CHEST PORT    Impression IMPRESSION:  Progression of coarse interstitial opacities within both lungs,  right greater than left. Findings may represent progressing pulmonary fibrosis,  however correlate clinically to exclude superimposed atypical infection.           Vital Signs:   Visit Vitals  BP (!) 168/74   Pulse 92   Temp 98.3 °F (36.8 °C)   Resp 18   Ht 5' 2\" (1.575 m)   Wt 48.1 kg (106 lb)   SpO2 96%   Breastfeeding No   BMI 19.39 kg/m²         Indications for treatment: H/o COPD, PNA        Plan of care: Oneida Moreno BID           Goal: COPD maintenance

## 2020-11-03 NOTE — PROGRESS NOTES
Problem: Patient Education: Go to Patient Education Activity  Goal: Patient/Family Education  Outcome: Progressing Towards Goal     Problem: Pneumonia: Day 1  Goal: Activity/Safety  Outcome: Progressing Towards Goal  Goal: Consults, if ordered  Outcome: Progressing Towards Goal  Goal: Diagnostic Test/Procedures  Outcome: Progressing Towards Goal  Goal: Nutrition/Diet  Outcome: Progressing Towards Goal  Goal: Medications  Outcome: Progressing Towards Goal  Goal: Respiratory  Outcome: Progressing Towards Goal  Goal: Treatments/Interventions/Procedures  Outcome: Progressing Towards Goal  Goal: Psychosocial  Outcome: Progressing Towards Goal  Goal: *Oxygen saturation within defined limits  Outcome: Progressing Towards Goal  Goal: *Influenza vaccine administered (October-March)  Outcome: Progressing Towards Goal  Goal: *Pneumoccocal vaccine administered  Outcome: Progressing Towards Goal  Goal: *Hemodynamically stable  Outcome: Progressing Towards Goal  Goal: *Demonstrates progressive activity  Outcome: Progressing Towards Goal  Goal: *Tolerating diet  Outcome: Progressing Towards Goal     Problem: Pneumonia: Day 2  Goal: Activity/Safety  Outcome: Progressing Towards Goal  Goal: Consults, if ordered  Outcome: Progressing Towards Goal  Goal: Diagnostic Test/Procedures  Outcome: Progressing Towards Goal  Goal: Nutrition/Diet  Outcome: Progressing Towards Goal  Goal: Discharge Planning  Outcome: Progressing Towards Goal  Goal: Medications  Outcome: Progressing Towards Goal  Goal: Respiratory  Outcome: Progressing Towards Goal  Goal: Treatments/Interventions/Procedures  Outcome: Progressing Towards Goal  Goal: Psychosocial  Outcome: Progressing Towards Goal  Goal: *Oxygen saturation within defined limits  Outcome: Progressing Towards Goal  Goal: *Hemodynamically stable  Outcome: Progressing Towards Goal  Goal: *Demonstrates progressive activity  Outcome: Progressing Towards Goal  Goal: *Tolerating diet  Outcome: Progressing Towards Goal  Goal: *Optimal pain control at patient's stated goal  Outcome: Progressing Towards Goal     Problem: Pneumonia: Discharge Outcomes  Goal: *Demonstrates progressive activity  Outcome: Progressing Towards Goal  Goal: *Describes follow-up/return visits to physicians  Outcome: Progressing Towards Goal  Goal: *Tolerating diet  Outcome: Progressing Towards Goal  Goal: *Verbalizes name, dosage, time, side effects, and number of days to continue medications  Outcome: Progressing Towards Goal  Goal: *Influenza immunization  Outcome: Progressing Towards Goal  Goal: *Pneumococcal immunization  Outcome: Progressing Towards Goal  Goal: *Respiratory status at baseline  Outcome: Progressing Towards Goal  Goal: *Vital signs within defined limits  Outcome: Progressing Towards Goal  Goal: *Describes available resources and support systems  Outcome: Progressing Towards Goal  Goal: *Optimal pain control at patient's stated goal  Outcome: Progressing Towards Goal     Problem: Pain  Goal: *Control of Pain  Outcome: Progressing Towards Goal     Problem: Patient Education: Go to Patient Education Activity  Goal: Patient/Family Education  Outcome: Progressing Towards Goal     Problem: Falls - Risk of  Goal: *Absence of Falls  Description: Document Brittney Fall Risk and appropriate interventions in the flowsheet.   Outcome: Progressing Towards Goal  Note: Fall Risk Interventions:                                Problem: Patient Education: Go to Patient Education Activity  Goal: Patient/Family Education  Outcome: Progressing Towards Goal

## 2020-11-03 NOTE — PROGRESS NOTES
conducted an initial consultation and Spiritual Assessment for Emily Smith, who is a 69 y.o.,female. Patients Primary Language is: [place]. According to the patients EMR Orthodoxy Affiliation is: [place]. The reason the Patient came to the hospital is:   Patient Active Problem List    Diagnosis Date Noted    SIRS (systemic inflammatory response syndrome) ([facility] 11/02/2020    COPD (chronic obstructive pulmonary disease) ([facility] 11/02/2020    Hypokalemia 11/02/2020    COPD with acute exacerbation ([facility] 08/14/2020    Rheumatoid arthritis ([facility] 08/14/2020    Chronic obstructive pulmonary disease with acute exacerbation ([facility] 05/04/2020    DM (diabetes mellitus) ([facility] 05/04/2020    Dehydration 05/04/2020    Hypomagnesemia 05/04/2020    Elevated WBC count 05/04/2020    Pneumonia 02/16/2020    Immunosuppressed status ([facility] 02/15/2020    Sepsis ([facility] 02/15/2020        The  provided the following Interventions:  Initiated a relationship of care and support. Explored issues of summer, spirituality and/or Gnosticism needs while hospitalized. Listened empathically. Provided chaplaincy education. Provided information about Spiritual Care Services. Offered prayer and assurance of continued prayers on patient's behalf. Chart reviewed. The following outcomes were achieved:  Patient shared some information about their medical narrative and spiritual journey/beliefs. Patient processed feeling about current hospitalization. Patient expressed gratitude for the 's visit. Assessment:  Patient did not indicate any spiritual or Gnosticism issues which require Spiritual Care Services interventions at this time. Patient does not have any Gnosticism/cultural needs that will affect patients preferences in health care. Plan:  Chaplains will continue to follow and will provide pastoral care on an as needed or requested basis.    recommends bedside caregivers page  on duty if patient shows signs of acute spiritual or emotional distress.     [address] [facility]   Staff [address] [facility]   [phone]

## 2020-11-04 LAB
ANION GAP SERPL CALC-SCNC: 5 MMOL/L (ref 3–18)
BASOPHILS # BLD: 0.1 K/UL (ref 0–0.1)
BASOPHILS NFR BLD: 0 % (ref 0–2)
BUN SERPL-MCNC: 5 MG/DL (ref 7–18)
BUN/CREAT SERPL: 10 (ref 12–20)
CALCIUM SERPL-MCNC: 8.7 MG/DL (ref 8.5–10.1)
CHLORIDE SERPL-SCNC: 106 MMOL/L (ref 100–111)
CO2 SERPL-SCNC: 29 MMOL/L (ref 21–32)
CREAT SERPL-MCNC: 0.48 MG/DL (ref 0.6–1.3)
DIFFERENTIAL METHOD BLD: ABNORMAL
EOSINOPHIL # BLD: 0.5 K/UL (ref 0–0.4)
EOSINOPHIL NFR BLD: 4 % (ref 0–5)
ERYTHROCYTE [DISTWIDTH] IN BLOOD BY AUTOMATED COUNT: 15.1 % (ref 11.6–14.5)
GLUCOSE BLD STRIP.AUTO-MCNC: 110 MG/DL (ref 70–110)
GLUCOSE BLD STRIP.AUTO-MCNC: 123 MG/DL (ref 70–110)
GLUCOSE BLD STRIP.AUTO-MCNC: 142 MG/DL (ref 70–110)
GLUCOSE BLD STRIP.AUTO-MCNC: 99 MG/DL (ref 70–110)
GLUCOSE SERPL-MCNC: 102 MG/DL (ref 74–99)
HCT VFR BLD AUTO: 28.1 % (ref 35–45)
HGB BLD-MCNC: 8.9 G/DL (ref 12–16)
LYMPHOCYTES # BLD: 2 K/UL (ref 0.9–3.6)
LYMPHOCYTES NFR BLD: 17 % (ref 21–52)
MAGNESIUM SERPL-MCNC: 1.6 MG/DL (ref 1.6–2.6)
MCH RBC QN AUTO: 28.7 PG (ref 24–34)
MCHC RBC AUTO-ENTMCNC: 31.7 G/DL (ref 31–37)
MCV RBC AUTO: 90.6 FL (ref 74–97)
MONOCYTES # BLD: 1.8 K/UL (ref 0.05–1.2)
MONOCYTES NFR BLD: 16 % (ref 3–10)
NEUTS SEG # BLD: 7.4 K/UL (ref 1.8–8)
NEUTS SEG NFR BLD: 63 % (ref 40–73)
PHOSPHATE SERPL-MCNC: 4.2 MG/DL (ref 2.5–4.9)
PLATELET # BLD AUTO: 639 K/UL (ref 135–420)
PMV BLD AUTO: 8.9 FL (ref 9.2–11.8)
POTASSIUM SERPL-SCNC: 3.4 MMOL/L (ref 3.5–5.5)
RBC # BLD AUTO: 3.1 M/UL (ref 4.2–5.3)
SODIUM SERPL-SCNC: 140 MMOL/L (ref 136–145)
WBC # BLD AUTO: 11.7 K/UL (ref 4.6–13.2)

## 2020-11-04 PROCEDURE — 94640 AIRWAY INHALATION TREATMENT: CPT

## 2020-11-04 PROCEDURE — 82962 GLUCOSE BLOOD TEST: CPT

## 2020-11-04 PROCEDURE — 74011636637 HC RX REV CODE- 636/637: Performed by: INTERNAL MEDICINE

## 2020-11-04 PROCEDURE — 74011000250 HC RX REV CODE- 250: Performed by: INTERNAL MEDICINE

## 2020-11-04 PROCEDURE — 74011250636 HC RX REV CODE- 250/636: Performed by: INTERNAL MEDICINE

## 2020-11-04 PROCEDURE — 84100 ASSAY OF PHOSPHORUS: CPT

## 2020-11-04 PROCEDURE — 2709999900 HC NON-CHARGEABLE SUPPLY

## 2020-11-04 PROCEDURE — 83735 ASSAY OF MAGNESIUM: CPT

## 2020-11-04 PROCEDURE — 74011000258 HC RX REV CODE- 258: Performed by: INTERNAL MEDICINE

## 2020-11-04 PROCEDURE — 36415 COLL VENOUS BLD VENIPUNCTURE: CPT

## 2020-11-04 PROCEDURE — 80048 BASIC METABOLIC PNL TOTAL CA: CPT

## 2020-11-04 PROCEDURE — 74011250637 HC RX REV CODE- 250/637: Performed by: INTERNAL MEDICINE

## 2020-11-04 PROCEDURE — 94761 N-INVAS EAR/PLS OXIMETRY MLT: CPT

## 2020-11-04 PROCEDURE — 77030021352 HC CBL LD SYS DISP COVD -B

## 2020-11-04 PROCEDURE — 65660000000 HC RM CCU STEPDOWN

## 2020-11-04 PROCEDURE — 85025 COMPLETE CBC W/AUTO DIFF WBC: CPT

## 2020-11-04 RX ORDER — LOSARTAN POTASSIUM 100 MG/1
100 TABLET ORAL DAILY
COMMUNITY

## 2020-11-04 RX ORDER — METOPROLOL SUCCINATE 25 MG/1
25 TABLET, EXTENDED RELEASE ORAL EVERY EVENING
COMMUNITY

## 2020-11-04 RX ORDER — CHOLECALCIFEROL (VITAMIN D3) 125 MCG
2000 CAPSULE ORAL
COMMUNITY

## 2020-11-04 RX ORDER — OXYCODONE AND ACETAMINOPHEN 5; 325 MG/1; MG/1
1 TABLET ORAL
Status: DISCONTINUED | OUTPATIENT
Start: 2020-11-04 | End: 2020-11-07 | Stop reason: HOSPADM

## 2020-11-04 RX ORDER — POTASSIUM CHLORIDE 20 MEQ/1
40 TABLET, EXTENDED RELEASE ORAL
Status: COMPLETED | OUTPATIENT
Start: 2020-11-04 | End: 2020-11-04

## 2020-11-04 RX ORDER — OXYCODONE AND ACETAMINOPHEN 5; 325 MG/1; MG/1
1 TABLET ORAL
Status: COMPLETED | OUTPATIENT
Start: 2020-11-04 | End: 2020-11-04

## 2020-11-04 RX ORDER — METOPROLOL SUCCINATE 25 MG/1
25 TABLET, EXTENDED RELEASE ORAL EVERY EVENING
Status: DISCONTINUED | OUTPATIENT
Start: 2020-11-04 | End: 2020-11-07 | Stop reason: HOSPADM

## 2020-11-04 RX ORDER — CLONIDINE HYDROCHLORIDE 0.1 MG/1
0.1 TABLET ORAL
Status: COMPLETED | OUTPATIENT
Start: 2020-11-04 | End: 2020-11-04

## 2020-11-04 RX ADMIN — OXYCODONE HYDROCHLORIDE AND ACETAMINOPHEN 1 TABLET: 5; 325 TABLET ORAL at 21:51

## 2020-11-04 RX ADMIN — Medication 4.5 MG: at 21:44

## 2020-11-04 RX ADMIN — BUSPIRONE HYDROCHLORIDE 7.5 MG: 7.5 TABLET ORAL at 08:21

## 2020-11-04 RX ADMIN — AZITHROMYCIN MONOHYDRATE 500 MG: 500 INJECTION, POWDER, LYOPHILIZED, FOR SOLUTION INTRAVENOUS at 14:45

## 2020-11-04 RX ADMIN — POTASSIUM CHLORIDE 40 MEQ: 1500 TABLET, EXTENDED RELEASE ORAL at 12:14

## 2020-11-04 RX ADMIN — HEPARIN SODIUM 5000 UNITS: 5000 INJECTION INTRAVENOUS; SUBCUTANEOUS at 08:21

## 2020-11-04 RX ADMIN — Medication 10 ML: at 13:02

## 2020-11-04 RX ADMIN — IPRATROPIUM BROMIDE AND ALBUTEROL SULFATE 3 ML: .5; 3 SOLUTION RESPIRATORY (INHALATION) at 13:07

## 2020-11-04 RX ADMIN — MONTELUKAST 10 MG: 10 TABLET, FILM COATED ORAL at 21:45

## 2020-11-04 RX ADMIN — FLUOXETINE 20 MG: 20 CAPSULE ORAL at 08:21

## 2020-11-04 RX ADMIN — PANTOPRAZOLE SODIUM 40 MG: 40 TABLET, DELAYED RELEASE ORAL at 08:21

## 2020-11-04 RX ADMIN — IPRATROPIUM BROMIDE AND ALBUTEROL SULFATE 3 ML: .5; 3 SOLUTION RESPIRATORY (INHALATION) at 20:15

## 2020-11-04 RX ADMIN — Medication 10 ML: at 05:19

## 2020-11-04 RX ADMIN — BUSPIRONE HYDROCHLORIDE 7.5 MG: 7.5 TABLET ORAL at 17:26

## 2020-11-04 RX ADMIN — IPRATROPIUM BROMIDE AND ALBUTEROL SULFATE 3 ML: .5; 3 SOLUTION RESPIRATORY (INHALATION) at 07:47

## 2020-11-04 RX ADMIN — HEPARIN SODIUM 5000 UNITS: 5000 INJECTION INTRAVENOUS; SUBCUTANEOUS at 23:43

## 2020-11-04 RX ADMIN — HYDROCODONE POLISTIREX AND CHLORPHENIRAMINE POLISTIREX 5 ML: 10; 8 SUSPENSION, EXTENDED RELEASE ORAL at 10:54

## 2020-11-04 RX ADMIN — METOPROLOL SUCCINATE 25 MG: 25 TABLET, EXTENDED RELEASE ORAL at 17:26

## 2020-11-04 RX ADMIN — CEFTRIAXONE 2 G: 2 INJECTION, POWDER, FOR SOLUTION INTRAMUSCULAR; INTRAVENOUS at 13:00

## 2020-11-04 RX ADMIN — ACETAMINOPHEN 650 MG: 325 TABLET, FILM COATED ORAL at 05:18

## 2020-11-04 RX ADMIN — INSULIN GLARGINE 8 UNITS: 100 INJECTION, SOLUTION SUBCUTANEOUS at 21:44

## 2020-11-04 RX ADMIN — Medication 10 ML: at 22:00

## 2020-11-04 RX ADMIN — HEPARIN SODIUM 5000 UNITS: 5000 INJECTION INTRAVENOUS; SUBCUTANEOUS at 16:21

## 2020-11-04 RX ADMIN — OXYCODONE HYDROCHLORIDE AND ACETAMINOPHEN 1 TABLET: 5; 325 TABLET ORAL at 14:55

## 2020-11-04 RX ADMIN — CLONIDINE HYDROCHLORIDE 0.1 MG: 0.1 TABLET ORAL at 14:55

## 2020-11-04 RX ADMIN — ARFORMOTEROL TARTRATE 15 MCG: 15 SOLUTION RESPIRATORY (INHALATION) at 07:47

## 2020-11-04 RX ADMIN — ARFORMOTEROL TARTRATE 15 MCG: 15 SOLUTION RESPIRATORY (INHALATION) at 20:15

## 2020-11-04 NOTE — PROGRESS NOTES
Respiratory Therapy Assessment Care Plan    Patient: Merry Leigh 69 y.o. female 11/4/2020 7:49 AM    Pneumonia [J18.9]      Chest X-RAY:   Results from Hospital Encounter encounter on 11/02/20   XR CHEST PORT    Impression Impression:    Right upper lobe and left lower lobe infiltrate, likely pneumonia. Recommend  follow-up chest x-ray to document resolution. Results from [place] encounter on 08/14/20   XR CHEST PA LAT    Impression IMPRESSION:    Diffuse interstitial opacities with subtle right peripheral opacities. XR CHEST PORT    Impression IMPRESSION:  Progression of coarse interstitial opacities within both lungs,  right greater than left. Findings may represent progressing pulmonary fibrosis,  however correlate clinically to exclude superimposed atypical infection.           Vital Signs:   Visit Vitals  BP (!) 181/88 (BP 1 Location: Right arm, BP Patient Position: Sitting)   Pulse 88   Temp 98.1 °F (36.7 °C)   Resp 18   Ht 5' 2\" (1.575 m)   Wt 47.7 kg (105 lb 3.2 oz)   SpO2 96%   Breastfeeding No   BMI 19.24 kg/m²       Indications for treatment: H/o COPD, PNA        Plan of care: Oneida Moreno BID           Goal: COPD maintenance

## 2020-11-04 NOTE — PROGRESS NOTES
1216 Report received from [facility] using SBAR and MAR. Pt currently resting in bed. Declines percocet at this time. Tussinex was given by off going nurse and pt reports no longer coughing. 1305 Assessment completed. IV antibiotics intiated. Respiratory at the bedside to give a breathing treatment. Pt left in bed with call bell in reach. 1455 . Pt complaining of a 10/10 headache. PO percocet and clonidine administered per MD orders. 1555 Pt states pain level is 1/10 and was able to sleep. 1624 /77. Dr. Tyler Rivas has ordered PO metoprolol to administer. 1655 Bedside and Verbal shift change report given to Yvette Esparza (oncoming nurse) by John Paul Dhaliwal RN   (offgoing nurse). Report included the following information SBAR, MAR and Cardiac Rhythm NSR.

## 2020-11-04 NOTE — PROGRESS NOTES
Internal Medicine Progress Note        NAME :  Nahed Lamas     :  1950  MRM  :  [id number]    Date/Time: 2020        ASSESSMENT/PLAN :      #  Bilateral , multifocal   Pneumonia with SIRS  : iv Abx cover CAP. Urine antigen negative. Follow up on CXR for clearance. Blood CS  maintain oxygenation . Control cough  CT chest report noted, no mention of possible picture of COVID.      # Immunosuppressed status due to Rheumatoid arthritis treatment       #  DM (diabetes mellitus) ([facility] (2020). Control blood sugar level. Provide SSI, hypoglycemia protocol and frequent Accu checks. Education . Diabetic Diet      #   Rheumatoid arthritis   follow with rheumatologist.     # Arthralgia multiple sites. Control pains . Add percocet      #   Ho COPD (chronic obstructive pulmonary disease) ([facility] (2020) . Home regimen . Fu with Dr Sabas Hinojosa       #  Hypokalemia (2020). Replete and trend. # Hypomagnesemia. Replete         # Continue home regimen / Medications when appropriate.      -DVT prophylaxis.      - Code Status : FULL      Principal Problem:    Pneumonia (2020)    Active Problems:    Immunosuppressed status ([facility] (2/15/2020)      DM (diabetes mellitus) ([facility] (2020)      Rheumatoid arthritis ([facility] (2020)      SIRS (systemic inflammatory response syndrome) ([facility]) (2020)      COPD (chronic obstructive pulmonary disease) ([facility] (2020)      Hypokalemia (2020)          None    Lab Review:     Recent Labs     20  0400 20  0425 20  1213   WBC 11.7 12.3 16.6*   HGB 8.9* 9.1* 9.6*   HCT 28.1* 28.6* 30.1*   * 560* 581*     Recent Labs     20  0400 20  0425 20  1213    141 135*   K 3.4* 3.8 3.3*    108 104   CO2 29 26 26   * 104* 96   BUN 5* 5* 8   CREA 0.48* 0.54* 0.67   CA 8.7 8.5 9.0   MG 1.6 1.1*  --    PHOS 4.2 3.8  --    ALB  --   --  2.7*   TBILI  --   --  0.3   ALT  --   --  12*     Lab Results Component Value Date/Time    Glucose (POC) 110 11/04/2020 07:28 AM    Glucose (POC) 150 (H) 11/03/2020 08:48 PM    Glucose (POC) 111 (H) 11/03/2020 04:45 PM    Glucose (POC) 85 11/03/2020 12:13 PM    Glucose (POC) 113 (H) 11/03/2020 08:35 AM     No results for input(s): PH, PCO2, PO2, HCO3, FIO2 in the last 72 hours. No results for input(s): INR, INREXT, INREXT in the last 72 hours. No results found for: SDES  Lab Results   Component Value Date/Time    Culture result: NO GROWTH 2 DAYS 11/02/2020 02:25 PM    Culture result: NO GROWTH 2 DAYS 11/02/2020 02:12 PM    Culture result: NO GROWTH 6 DAYS 08/14/2020 01:30 PM       All Cardiac Markers in the last 24 hours: No results found for: CPK, CK, CKMMB, CKMB, RCK3, CKMBT, CKNDX, CKND1, MARTIN, TROPT, TROIQ, JO, TROPT, TNIPOC, BNP, BNPP        Intervals noted   Pt s/e @ bedside     Subjective:     Chief Complaint:       SOB getting better , still coughing with little tanish phlegm ( I seen sample) . Cough. Arthralgia bad from her RA. ROS:  No CP/ N/V/D/Pains/Bleeding/ acute joint swelling/rash/itching/fever/chills. Tolerating Diet                Objective:     Vitals:  Last 24hrs VS reviewed since prior progress note. Most recent are:    Visit Vitals  BP (!) 148/79   Pulse 88   Temp 98.1 °F (36.7 °C)   Resp 18   Ht 5' 2\" (1.575 m)   Wt 47.7 kg (105 lb 3.2 oz)   SpO2 96%   Breastfeeding No   BMI 19.24 kg/m²     SpO2 Readings from Last 6 Encounters:   11/04/20 96%   08/19/20 91%   05/07/20 97%   02/17/20 91%            Intake/Output Summary (Last 24 hours) at 11/4/2020 1054  Last data filed at 11/4/2020 0000  Gross per 24 hour   Intake 240 ml   Output 365 ml   Net -125 ml          Physical Exam:   Gen: frequent coughs,   Appear stated age, Well-developed,   in no acute distress  HEENT:  Head atraumatic, normocephalic , hearing intact to voice, moist mucous membranes.   Neck:   Trachea midline , No apparent JVD, Supple,  thyroid non-tender  Resp: better AE, R basal fine crackles ,   No accessory muscle use,Bilateral BS present   Card:    normal S1, S2 without Gallop . No Significant lower leg peripheral edema. Abd:  Soft, non-tender, non-distended, bowel sounds are present . Musc:  No cyanosis or clubbing. Skin:   Warm and dry . No rashes or ulcers, skin turgor is good. Neuro:  Cranial nerves are grossly intact, no clear area of focal motor weakness, follows commands appropriately. Psych:  Good insight, oriented to person, place and time, alert.     Medications Reviewed: (see below)    Lab Data Reviewed: (see below)    ______________________________________________________________________    Medications:     Current Facility-Administered Medications   Medication Dose Route Frequency    azithromycin (ZITHROMAX) 500 mg in 0.9% sodium chloride 250 mL IVPB  500 mg IntraVENous ONCE    metoprolol succinate (TOPROL-XL) XL tablet 25 mg  25 mg Oral QPM    cloNIDine HCL (CATAPRES) tablet 0.1 mg  0.1 mg Oral NOW    oxyCODONE-acetaminophen (PERCOCET) 5-325 mg per tablet 1 Tab  1 Tab Oral Q8H PRN    HYDROcodone-chlorpheniramine (TUSSIONEX) oral suspension 5 mL  5 mL Oral Q12H PRN    insulin glargine (LANTUS) injection 8 Units  8 Units SubCUTAneous QHS    losartan (COZAAR) tablet 100 mg  100 mg Oral DAILY    sodium chloride (NS) flush 5-10 mL  5-10 mL IntraVENous PRN    cefTRIAXone (ROCEPHIN) 2 g in 0.9% sodium chloride (MBP/ADV) 50 mL MBP  2 g IntraVENous Q24H    albuterol-ipratropium (DUO-NEB) 2.5 MG-0.5 MG/3 ML  3 mL Nebulization QID RT    folic acid (FOLVITE) tablet 1 mg  1 mg Oral DAILY    montelukast (SINGULAIR) tablet 10 mg  10 mg Oral DAILY    pantoprazole (PROTONIX) tablet 40 mg  40 mg Oral ACB    sodium chloride (NS) flush 5-40 mL  5-40 mL IntraVENous Q8H    sodium chloride (NS) flush 5-40 mL  5-40 mL IntraVENous PRN    heparin (porcine) injection 5,000 Units  5,000 Units SubCUTAneous Q8H    insulin lispro (HUMALOG) injection   SubCUTAneous AC&HS  glucose chewable tablet 16 g  4 Tab Oral PRN    glucagon (GLUCAGEN) injection 1 mg  1 mg IntraMUSCular PRN    dextrose (D50) infusion 12.5-25 g  25-50 mL IntraVENous PRN    arformoteroL (BROVANA) neb solution 15 mcg  15 mcg Nebulization BID RT    acetaminophen (TYLENOL) tablet 650 mg  650 mg Oral Q6H PRN    FLUoxetine (PROzac) capsule 20 mg  20 mg Oral DAILY    busPIRone (BUSPAR) tablet 7.5 mg  7.5 mg Oral BID    melatonin tablet 4.5 mg  4.5 mg Oral QHS          Total time spent with patient: 35 minutes                  Care Plan discussed with: Patient, Care Manager, Nursing Staff and >50% of time spent in counseling and coordination of care    Discussed:  Care Plan    Prophylaxis:  Hep SQ    Disposition:  Home w/Family           This document in whole or part of it has been produced using voice recognition software. Unrecognized errors in transcription may be present.     Attending Physician: Payal Canales MD

## 2020-11-04 NOTE — PROGRESS NOTES
Problem: Patient Education: Go to Patient Education Activity  Goal: Patient/Family Education  Outcome: Progressing Towards Goal     Problem: Pneumonia: Day 1  Goal: Activity/Safety  Outcome: Progressing Towards Goal  Goal: Consults, if ordered  Outcome: Progressing Towards Goal  Goal: Diagnostic Test/Procedures  Outcome: Progressing Towards Goal  Goal: Nutrition/Diet  Outcome: Progressing Towards Goal  Goal: Medications  Outcome: Progressing Towards Goal  Goal: Respiratory  Outcome: Progressing Towards Goal  Goal: Treatments/Interventions/Procedures  Outcome: Progressing Towards Goal  Goal: Psychosocial  Outcome: Progressing Towards Goal  Goal: *Oxygen saturation within defined limits  Outcome: Progressing Towards Goal  Goal: *Influenza vaccine administered (October-March)  Outcome: Progressing Towards Goal  Goal: *Pneumoccocal vaccine administered  Outcome: Progressing Towards Goal  Goal: *Hemodynamically stable  Outcome: Progressing Towards Goal  Goal: *Demonstrates progressive activity  Outcome: Progressing Towards Goal  Goal: *Tolerating diet  Outcome: Progressing Towards Goal     Problem: Pneumonia: Day 2  Goal: Activity/Safety  Outcome: Progressing Towards Goal  Goal: Consults, if ordered  Outcome: Progressing Towards Goal  Goal: Diagnostic Test/Procedures  Outcome: Progressing Towards Goal  Goal: Nutrition/Diet  Outcome: Progressing Towards Goal  Goal: Discharge Planning  Outcome: Progressing Towards Goal  Goal: Medications  Outcome: Progressing Towards Goal  Goal: Respiratory  Outcome: Progressing Towards Goal  Goal: Treatments/Interventions/Procedures  Outcome: Progressing Towards Goal  Goal: Psychosocial  Outcome: Progressing Towards Goal  Goal: *Oxygen saturation within defined limits  Outcome: Progressing Towards Goal  Goal: *Hemodynamically stable  Outcome: Progressing Towards Goal  Goal: *Demonstrates progressive activity  Outcome: Progressing Towards Goal  Goal: *Tolerating diet  Outcome: Progressing Towards Goal  Goal: *Optimal pain control at patient's stated goal  Outcome: Progressing Towards Goal     Problem: Pneumonia: Discharge Outcomes  Goal: *Demonstrates progressive activity  Outcome: Progressing Towards Goal  Goal: *Describes follow-up/return visits to physicians  Outcome: Progressing Towards Goal  Goal: *Tolerating diet  Outcome: Progressing Towards Goal  Goal: *Verbalizes name, dosage, time, side effects, and number of days to continue medications  Outcome: Progressing Towards Goal  Goal: *Influenza immunization  Outcome: Progressing Towards Goal  Goal: *Pneumococcal immunization  Outcome: Progressing Towards Goal  Goal: *Respiratory status at baseline  Outcome: Progressing Towards Goal  Goal: *Vital signs within defined limits  Outcome: Progressing Towards Goal  Goal: *Describes available resources and support systems  Outcome: Progressing Towards Goal  Goal: *Optimal pain control at patient's stated goal  Outcome: Progressing Towards Goal     Problem: Pain  Goal: *Control of Pain  Outcome: Progressing Towards Goal     Problem: Patient Education: Go to Patient Education Activity  Goal: Patient/Family Education  Outcome: Progressing Towards Goal     Problem: Falls - Risk of  Goal: *Absence of Falls  Description: Document Brittney Fall Risk and appropriate interventions in the flowsheet.   Outcome: Progressing Towards Goal  Note: Fall Risk Interventions:            Medication Interventions: Bed/chair exit alarm, Patient to call before getting OOB    Elimination Interventions: Call light in reach, Bed/chair exit alarm, Toileting schedule/hourly rounds, Patient to call for help with toileting needs              Problem: Patient Education: Go to Patient Education Activity  Goal: Patient/Family Education  Outcome: Progressing Towards Goal     Problem: Discharge Planning  Goal: *Discharge to safe environment  Outcome: Progressing Towards Goal     Problem: Nutrition Deficit  Goal: *Optimize nutritional status  Outcome: Progressing Towards Goal     Problem: Patient Education: Go to Patient Education Activity  Goal: Patient/Family Education  Outcome: Progressing Towards Goal

## 2020-11-04 NOTE — PROGRESS NOTES
-- Bedside, Verbal and Written shift change report given to  [address] [place] [address] (oncoming nurse) by Tyra Lundberg RN (offgoing nurse). Report included the following information SBAR, Kardex, Intake/Output, MAR and Recent Results. 2131-- PM medications administered, pt tolerated with ease, will continue to monitor. 2135--Pt states she takes Cozaar at home 100mg daily for HTN but she hasn't taken it since she got admitted. Dr Campuzano Friends made aware and orders received to start Pt on Cozaar 100mg daily. 2352-- Shift reassessment, pt condition unchanged, will continue to monitor. 0400--  Shift reassessment, pt sleeping between care, will continue to monitor. -- Bedside, Verbal and Written shift change report given to -Chris Rose RN (oncoming nurse) by Ronel Guan RN (offgoing nurse). Report included the following information SBAR, Kardex, Intake/Output, MAR and Recent Results. Skin assessment completed.

## 2020-11-04 NOTE — PROGRESS NOTES
Patient received in bed awake. A&Ox4, denies pain and discomfort. [place]; no distress noted. Frequently use items within reach. Bed locked in low position. Call bell within reach and Patient verbalized understanding of use for assistance and needs.

## 2020-11-04 NOTE — PROGRESS NOTES
Adjusted azithromycin order to have a regimen of azithromycin 500mg q24h for 3 doses total, due to the medications extended post antibiotic effects per protocol  -Praveen Lewis, PharmD

## 2020-11-04 NOTE — PROGRESS NOTES
Discharge/Transition Planning    Problem: Discharge Planning  Goal: *Discharge to safe environment  Outcome: Progressing Towards Goal    Home and recommend [facility] visit    Marichuy Engle RN BSN  Outcomes Manager  Office # [phone]  Pager # [phone]

## 2020-11-04 NOTE — PROGRESS NOTES
0800-Received pt resting in bed with eyes open, no sign of distress, pt states she is feeling better, has some SOB with activity but that is not unusual for her per her report,  0940-BP rechecked, 148/79, clonidine held, will continue to monitor  1230-Bedside and Verbal shift change report given to Charanjit GILBERT (oncoming nurse) by Desmond Mai RN (offgoing nurse).  Report included the following information SBAR, Kardex, Intake/Output, MAR, Recent Results and Cardiac Rhythm SR.

## 2020-11-05 LAB
ANION GAP SERPL CALC-SCNC: 6 MMOL/L (ref 3–18)
BUN SERPL-MCNC: 5 MG/DL (ref 7–18)
BUN/CREAT SERPL: 10 (ref 12–20)
CALCIUM SERPL-MCNC: 8.8 MG/DL (ref 8.5–10.1)
CHLORIDE SERPL-SCNC: 104 MMOL/L (ref 100–111)
CO2 SERPL-SCNC: 28 MMOL/L (ref 21–32)
CREAT SERPL-MCNC: 0.5 MG/DL (ref 0.6–1.3)
GLUCOSE BLD STRIP.AUTO-MCNC: 101 MG/DL (ref 70–110)
GLUCOSE BLD STRIP.AUTO-MCNC: 142 MG/DL (ref 70–110)
GLUCOSE BLD STRIP.AUTO-MCNC: 172 MG/DL (ref 70–110)
GLUCOSE BLD STRIP.AUTO-MCNC: 79 MG/DL (ref 70–110)
GLUCOSE SERPL-MCNC: 84 MG/DL (ref 74–99)
MAGNESIUM SERPL-MCNC: 1.7 MG/DL (ref 1.6–2.6)
POTASSIUM SERPL-SCNC: 4.4 MMOL/L (ref 3.5–5.5)
SODIUM SERPL-SCNC: 138 MMOL/L (ref 136–145)

## 2020-11-05 PROCEDURE — 82962 GLUCOSE BLOOD TEST: CPT

## 2020-11-05 PROCEDURE — 36415 COLL VENOUS BLD VENIPUNCTURE: CPT

## 2020-11-05 PROCEDURE — 74011250637 HC RX REV CODE- 250/637: Performed by: HOSPITALIST

## 2020-11-05 PROCEDURE — 65660000000 HC RM CCU STEPDOWN

## 2020-11-05 PROCEDURE — 83735 ASSAY OF MAGNESIUM: CPT

## 2020-11-05 PROCEDURE — 74011250636 HC RX REV CODE- 250/636: Performed by: INTERNAL MEDICINE

## 2020-11-05 PROCEDURE — 74011636637 HC RX REV CODE- 636/637: Performed by: INTERNAL MEDICINE

## 2020-11-05 PROCEDURE — 74011250637 HC RX REV CODE- 250/637: Performed by: INTERNAL MEDICINE

## 2020-11-05 PROCEDURE — 80048 BASIC METABOLIC PNL TOTAL CA: CPT

## 2020-11-05 PROCEDURE — 94761 N-INVAS EAR/PLS OXIMETRY MLT: CPT

## 2020-11-05 PROCEDURE — 94640 AIRWAY INHALATION TREATMENT: CPT

## 2020-11-05 PROCEDURE — 74011000250 HC RX REV CODE- 250: Performed by: INTERNAL MEDICINE

## 2020-11-05 PROCEDURE — 74011000258 HC RX REV CODE- 258: Performed by: INTERNAL MEDICINE

## 2020-11-05 RX ORDER — AMLODIPINE BESYLATE 5 MG/1
5 TABLET ORAL DAILY
Status: DISCONTINUED | OUTPATIENT
Start: 2020-11-05 | End: 2020-11-07 | Stop reason: HOSPADM

## 2020-11-05 RX ADMIN — OXYCODONE HYDROCHLORIDE AND ACETAMINOPHEN 1 TABLET: 5; 325 TABLET ORAL at 22:21

## 2020-11-05 RX ADMIN — Medication 10 ML: at 06:04

## 2020-11-05 RX ADMIN — CEFTRIAXONE 2 G: 2 INJECTION, POWDER, FOR SOLUTION INTRAMUSCULAR; INTRAVENOUS at 14:21

## 2020-11-05 RX ADMIN — BUSPIRONE HYDROCHLORIDE 7.5 MG: 7.5 TABLET ORAL at 08:50

## 2020-11-05 RX ADMIN — IPRATROPIUM BROMIDE AND ALBUTEROL SULFATE 3 ML: .5; 3 SOLUTION RESPIRATORY (INHALATION) at 08:15

## 2020-11-05 RX ADMIN — METOPROLOL SUCCINATE 25 MG: 25 TABLET, EXTENDED RELEASE ORAL at 17:29

## 2020-11-05 RX ADMIN — PANTOPRAZOLE SODIUM 40 MG: 40 TABLET, DELAYED RELEASE ORAL at 08:50

## 2020-11-05 RX ADMIN — IPRATROPIUM BROMIDE AND ALBUTEROL SULFATE 3 ML: .5; 3 SOLUTION RESPIRATORY (INHALATION) at 21:05

## 2020-11-05 RX ADMIN — Medication 10 ML: at 14:21

## 2020-11-05 RX ADMIN — HEPARIN SODIUM 5000 UNITS: 5000 INJECTION INTRAVENOUS; SUBCUTANEOUS at 08:53

## 2020-11-05 RX ADMIN — PREDNISONE 40 MG: 5 TABLET ORAL at 12:23

## 2020-11-05 RX ADMIN — ARFORMOTEROL TARTRATE 15 MCG: 15 SOLUTION RESPIRATORY (INHALATION) at 21:05

## 2020-11-05 RX ADMIN — ARFORMOTEROL TARTRATE 15 MCG: 15 SOLUTION RESPIRATORY (INHALATION) at 08:15

## 2020-11-05 RX ADMIN — MONTELUKAST 10 MG: 10 TABLET, FILM COATED ORAL at 21:54

## 2020-11-05 RX ADMIN — IPRATROPIUM BROMIDE AND ALBUTEROL SULFATE 3 ML: .5; 3 SOLUTION RESPIRATORY (INHALATION) at 13:36

## 2020-11-05 RX ADMIN — HEPARIN SODIUM 5000 UNITS: 5000 INJECTION INTRAVENOUS; SUBCUTANEOUS at 23:47

## 2020-11-05 RX ADMIN — Medication 10 ML: at 21:56

## 2020-11-05 RX ADMIN — Medication 4.5 MG: at 21:50

## 2020-11-05 RX ADMIN — INSULIN LISPRO 2 UNITS: 100 INJECTION, SOLUTION INTRAVENOUS; SUBCUTANEOUS at 16:54

## 2020-11-05 RX ADMIN — IPRATROPIUM BROMIDE AND ALBUTEROL SULFATE 3 ML: .5; 3 SOLUTION RESPIRATORY (INHALATION) at 17:40

## 2020-11-05 RX ADMIN — HEPARIN SODIUM 5000 UNITS: 5000 INJECTION INTRAVENOUS; SUBCUTANEOUS at 16:22

## 2020-11-05 RX ADMIN — LOSARTAN POTASSIUM 100 MG: 50 TABLET ORAL at 01:10

## 2020-11-05 RX ADMIN — INSULIN GLARGINE 8 UNITS: 100 INJECTION, SOLUTION SUBCUTANEOUS at 21:50

## 2020-11-05 RX ADMIN — LOSARTAN POTASSIUM 100 MG: 50 TABLET ORAL at 08:51

## 2020-11-05 RX ADMIN — AMLODIPINE BESYLATE 5 MG: 5 TABLET ORAL at 12:26

## 2020-11-05 RX ADMIN — FLUOXETINE 20 MG: 20 CAPSULE ORAL at 08:52

## 2020-11-05 RX ADMIN — BUSPIRONE HYDROCHLORIDE 7.5 MG: 7.5 TABLET ORAL at 17:29

## 2020-11-05 NOTE — PROGRESS NOTES
Internal Medicine Progress Note        NAME :  Marily Corona     :  1950  MRM  :  [id number]    Date/Time: 2020        ASSESSMENT/PLAN : repeat CXR in am - slowly improving. Less cough     #  Bilateral , multifocal   Pneumonia with SIRS  : iv Abx cover CAP. Urine antigen negative. Follow up on CXR for clearance. Blood CS NTD  maintain oxygenation . Control cough  CT chest report noted, no mention of possible picture of COVID.      # Immunosuppressed status due to Rheumatoid arthritis treatment       #  DM (diabetes mellitus) ([facility] (2020). Control blood sugar level. Provide SSI, hypoglycemia protocol and frequent Accu checks. Education . Diabetic Diet      #   Rheumatoid arthritis   follow with rheumatologist.     # Arthralgia multiple sites. Control pains . Add percocet      #   Ho COPD (chronic obstructive pulmonary disease) ([facility] (2020) . Home regimen . Fu with Dr Zhang Andrews , started on short course steroid- will help her arthritis possibly.        #  Hypokalemia (2020). Replete and trend. # Hypomagnesemia. Replete         # Continue home regimen / Medications when appropriate.      -DVT prophylaxis.      - Code Status : FULL      Principal Problem:    Pneumonia (2020)    Active Problems:    Immunosuppressed status ([facility] (2/15/2020)      DM (diabetes mellitus) ([facility] (2020)      Rheumatoid arthritis ([facility] (2020)      SIRS (systemic inflammatory response syndrome) ([facility]) (2020)      COPD (chronic obstructive pulmonary disease) ([facility] (2020)      Hypokalemia (2020)          None    Lab Review:     Recent Labs     20  0400 20  0425 20  1213   WBC 11.7 12.3 16.6*   HGB 8.9* 9.1* 9.6*   HCT 28.1* 28.6* 30.1*   * 560* 581*     Recent Labs     20  0350 20  0400 20  0425 20  1213    140 141 135*   K 4.4 3.4* 3.8 3.3*    106 108 104   CO2 28 29 26 26   GLU 84 102* 104* 96   BUN 5* 5* 5* 8 CREA 0.50* 0.48* 0.54* 0.67   CA 8.8 8.7 8.5 9.0   MG 1.7 1.6 1.1*  --    PHOS  --  4.2 3.8  --    ALB  --   --   --  2.7*   TBILI  --   --   --  0.3   ALT  --   --   --  12*     Lab Results   Component Value Date/Time    Glucose (POC) 101 11/05/2020 07:11 AM    Glucose (POC) 123 (H) 11/04/2020 09:08 PM    Glucose (POC) 142 (H) 11/04/2020 03:56 PM    Glucose (POC) 99 11/04/2020 11:17 AM    Glucose (POC) 110 11/04/2020 07:28 AM     No results for input(s): PH, PCO2, PO2, HCO3, FIO2 in the last 72 hours. No results for input(s): INR, INREXT, INREXT in the last 72 hours. No results found for: SDES  Lab Results   Component Value Date/Time    Culture result: NO GROWTH 3 DAYS 11/02/2020 02:25 PM    Culture result: NO GROWTH 3 DAYS 11/02/2020 02:12 PM    Culture result: NO GROWTH 6 DAYS 08/14/2020 01:30 PM       All Cardiac Markers in the last 24 hours: No results found for: CPK, CK, CKMMB, CKMB, RCK3, CKMBT, CKNDX, CKND1, MARTIN, TROPT, TROIQ, JO, TROPT, TNIPOC, BNP, BNPP        Intervals noted   Pt s/e @ bedside     Subjective:     Chief Complaint:       SOB getting better cough better , closing to her baseline. Pain medicine helping  Arthralgia bad from her RA. ROS:  No CP/ N/V/D/Pains/Bleeding/ acute joint swelling/rash/itching/fever/chills. Tolerating Diet                Objective:     Vitals:  Last 24hrs VS reviewed since prior progress note.  Most recent are:    Visit Vitals  BP (!) 161/84 (BP 1 Location: Right arm, BP Patient Position: At rest)   Pulse 86   Temp 97.9 °F (36.6 °C)   Resp 18   Ht 5' 2\" (1.575 m)   Wt 48.2 kg (106 lb 4.2 oz)   SpO2 92%   Breastfeeding No   BMI 19.44 kg/m²     SpO2 Readings from Last 6 Encounters:   11/05/20 92%   08/19/20 91%   05/07/20 97%   02/17/20 91%            Intake/Output Summary (Last 24 hours) at 11/5/2020 1114  Last data filed at 11/4/2020 2151  Gross per 24 hour   Intake 360 ml   Output    Net 360 ml          Physical Exam:   Gen: frequent coughs,   Appear stated age, Well-developed,   in no acute distress  HEENT:  Head atraumatic, normocephalic , hearing intact to voice, moist mucous membranes. Neck:   Trachea midline , No apparent JVD, Supple,  thyroid non-tender  Resp: better AE, R basal fine crackles , slight wheezes noted today    No accessory muscle use,Bilateral BS present   Card:    normal S1, S2 without Gallop . No Significant lower leg peripheral edema. Abd:  Soft, non-tender, non-distended, bowel sounds are present . Musc:  No cyanosis or clubbing. Skin:   Warm and dry . No rashes or ulcers, skin turgor is good. Neuro:  Cranial nerves are grossly intact, no clear area of focal motor weakness, follows commands appropriately. Psych:  Good insight, oriented to person, place and time, alert.     Medications Reviewed: (see below)    Lab Data Reviewed: (see below)    ______________________________________________________________________    Medications:     Current Facility-Administered Medications   Medication Dose Route Frequency    metoprolol succinate (TOPROL-XL) XL tablet 25 mg  25 mg Oral QPM    oxyCODONE-acetaminophen (PERCOCET) 5-325 mg per tablet 1 Tab  1 Tab Oral Q8H PRN    HYDROcodone-chlorpheniramine (TUSSIONEX) oral suspension 5 mL  5 mL Oral Q12H PRN    insulin glargine (LANTUS) injection 8 Units  8 Units SubCUTAneous QHS    losartan (COZAAR) tablet 100 mg  100 mg Oral DAILY    sodium chloride (NS) flush 5-10 mL  5-10 mL IntraVENous PRN    cefTRIAXone (ROCEPHIN) 2 g in 0.9% sodium chloride (MBP/ADV) 50 mL MBP  2 g IntraVENous Q24H    albuterol-ipratropium (DUO-NEB) 2.5 MG-0.5 MG/3 ML  3 mL Nebulization QID RT    folic acid (FOLVITE) tablet 1 mg  1 mg Oral DAILY    montelukast (SINGULAIR) tablet 10 mg  10 mg Oral DAILY    pantoprazole (PROTONIX) tablet 40 mg  40 mg Oral ACB    sodium chloride (NS) flush 5-40 mL  5-40 mL IntraVENous Q8H    sodium chloride (NS) flush 5-40 mL  5-40 mL IntraVENous PRN    heparin (porcine) injection 5,000 Units  5,000 Units SubCUTAneous Q8H    insulin lispro (HUMALOG) injection   SubCUTAneous AC&HS    glucose chewable tablet 16 g  4 Tab Oral PRN    glucagon (GLUCAGEN) injection 1 mg  1 mg IntraMUSCular PRN    dextrose (D50) infusion 12.5-25 g  25-50 mL IntraVENous PRN    arformoteroL (BROVANA) neb solution 15 mcg  15 mcg Nebulization BID RT    acetaminophen (TYLENOL) tablet 650 mg  650 mg Oral Q6H PRN    FLUoxetine (PROzac) capsule 20 mg  20 mg Oral DAILY    busPIRone (BUSPAR) tablet 7.5 mg  7.5 mg Oral BID    melatonin tablet 4.5 mg  4.5 mg Oral QHS          Total time spent with patient: 35 minutes                  Care Plan discussed with: Patient, Care Manager, Nursing Staff and >50% of time spent in counseling and coordination of care    Discussed:  Care Plan    Prophylaxis:  Hep SQ    Disposition:  Home w/Family           This document in whole or part of it has been produced using voice recognition software. Unrecognized errors in transcription may be present.     Attending Physician: Lul Terry MD

## 2020-11-05 NOTE — PROGRESS NOTES
Bedside shift report received from [address] [place] [address]: AOX4, on roomair, with regular, nonlbaored breathing at rest; watching tv; shift assessment done    2144: due meds given    2151: prn Percocet po given as requested for shoulder and back pain- see flowsheet    2230: resting in bed; no visual indicators for pain noted    0030: resting in bed; no complaints voiced    0200: sleeping; with regular, nonlabored breathing    0500: sleeping comfortably    0630: awake; up to the bathroom; no complaints voiced    0715: Bedside and Verbal shift change report given to Osmel De Los Santos RN  (oncoming nurse) by Osvaldo Pierce RN (offgoing nurse). Report included the following information SBAR, Kardex, Intake/Output, Recent Results and Cardiac Rhythm NSR.

## 2020-11-05 NOTE — ROUTINE PROCESS
0715  -- Bedside, Verbal and Written shift change report received by [facility] (oncoming nurse) by Mary(offgoing nurse). Allergy band placed on pt's wrist. Report included the following information SBAR, Kardex, Intake/Output, MAR and Recent Results. 0900--Assessment completed, call bell within reach, no distress noted. AM  medications administered, pt tolerated with ease, will continue to monitor. 1200 -- Shift reassessment, pt condition unchanged, will continue to monitor. 1400-resting quietly in bed. no distress noted. 1600 --  Shift reassessment, pt condition unchanged, will continue to monitor. 1910 -- Bedside, Verbal and Written shift change report given to Sr. Valencia (oncoming nurse) by [facility] (offgoing nurse). Report included the following information SBAR, Kardex, Intake/Output, MAR and Recent Results. Skin assessment completed.

## 2020-11-05 NOTE — PROGRESS NOTES
Problem: Pneumonia: Day 3  Goal: Off Pathway (Use only if patient is Off Pathway)  Outcome: Progressing Towards Goal  Goal: Activity/Safety  Outcome: Progressing Towards Goal  Goal: Consults, if ordered  Outcome: Progressing Towards Goal  Goal: Diagnostic Test/Procedures  Outcome: Progressing Towards Goal  Goal: Nutrition/Diet  Outcome: Progressing Towards Goal  Goal: Discharge Planning  Outcome: Progressing Towards Goal  Goal: Medications  Outcome: Progressing Towards Goal  Goal: Respiratory  Outcome: Progressing Towards Goal  Goal: Treatments/Interventions/Procedures  Outcome: Progressing Towards Goal  Goal: Psychosocial  Outcome: Progressing Towards Goal  Goal: *Oxygen saturation within defined limits  Outcome: Progressing Towards Goal  Goal: *Hemodynamically stable  Outcome: Progressing Towards Goal  Goal: *Demonstrates progressive activity  Outcome: Progressing Towards Goal  Goal: *Tolerating diet  Outcome: Progressing Towards Goal  Goal: *Describes available resources and support systems  Outcome: Progressing Towards Goal  Goal: *Optimal pain control at patient's stated goal  Outcome: Progressing Towards Goal     Problem: Pain  Goal: *Control of Pain  Outcome: Progressing Towards Goal     Problem: Falls - Risk of  Goal: *Absence of Falls  Description: Document Brittney Fall Risk and appropriate interventions in the flowsheet.   Outcome: Progressing Towards Goal  Note: Fall Risk Interventions:            Medication Interventions: Bed/chair exit alarm, Evaluate medications/consider consulting pharmacy, Teach patient to arise slowly, Patient to call before getting OOB    Elimination Interventions: Call light in reach, Patient to call for help with toileting needs, Toileting schedule/hourly rounds

## 2020-11-05 NOTE — PROGRESS NOTES
Discharge/Transition Planning     Problem: Discharge Planning  Goal: *Discharge to safe environment  Outcome: Progressing Towards Goal     Home and recommend [facility] visit     Parviz Zaman RN BSN  Outcomes Manager  Office # [phone]  Pager # [phone]

## 2020-11-05 NOTE — PROGRESS NOTES
Problem: Patient Education: Go to Patient Education Activity  Goal: Patient/Family Education  Outcome: Resolved/Not Met

## 2020-11-05 NOTE — ROUTINE PROCESS
Bedside and Verbal shift change report given to [facility], RN (oncoming nurse) by Alcira West RN, BSN (offgoing nurse). Report given with SBAR, Kardex, Intake/Output, MAR and Recent Results.

## 2020-11-05 NOTE — PROGRESS NOTES
Physician Progress Note      PATIENT:               Franklyn Peter  CSN #:                  [id number]  :                       1950  ADMIT DATE:       2020 1:12 PM  [address] [place] DATE:  RESPONDING  PROVIDER #:        Anup Barnhart MD          QUERY TEXT:    Pt admitted with pneumonia. Pt noted to be immunosuppressed with a WBC 16.6 and  on admission. If possible, please document in the progress notes and discharge summary if you are evaluating and /or treating any of the following: The medical record reflects the following:  Risk Factors: 70 yo female with history of rheumatoid arthritis on immunosuppressive therapy    Clinical Indicators: On admission WBC 16.6,     => Per H&P \"Immunosuppressed status due to Rheumatoid arthritis treatment\",  \"Report been feverish with temp 102\"    => CXR with pneumonia on admission    Treatment: IV antibiotics, serial labs      Thank your time,  Harjinder Al RN, [facility]  [phone]  Options provided:  -- Sepsis, present on admission  -- Sepsis, now resolved  -- Sepsis, not present on admission,  -- No Sepsis, pneumonia only  -- Sepsis was ruled out  -- Other - I will add my own diagnosis  -- Disagree - Not applicable / Not valid  -- Disagree - Clinically unable to determine / Unknown  -- Refer to Clinical Documentation Reviewer    PROVIDER RESPONSE TEXT:    This patient has sepsis that was not present on admission.     Query created by: Dandy Giraldo on 2020 1:05 PM      Electronically signed by:  Anup Barnhart MD 2020 3:26 PM

## 2020-11-05 NOTE — PROGRESS NOTES
Respiratory Therapy Assessment Care Plan    Patient: Carlee Jean-Baptiste 69 y.o. female 11/5/2020 8:20 AM    Pneumonia [J18.9]      Chest X-RAY:   Results from Hospital Encounter encounter on 11/02/20   XR CHEST PORT    Impression Impression:    Right upper lobe and left lower lobe infiltrate, likely pneumonia. Recommend  follow-up chest x-ray to document resolution. Results from [place] encounter on 08/14/20   XR CHEST PA LAT    Impression IMPRESSION:    Diffuse interstitial opacities with subtle right peripheral opacities. XR CHEST PORT    Impression IMPRESSION:  Progression of coarse interstitial opacities within both lungs,  right greater than left. Findings may represent progressing pulmonary fibrosis,  however correlate clinically to exclude superimposed atypical infection.           Vital Signs:   Visit Vitals  BP (!) 163/80 (BP 1 Location: Right arm, BP Patient Position: At rest)   Pulse 78   Temp 98.7 °F (37.1 °C)   Resp 18   Ht 5' 2\" (1.575 m)   Wt 48.2 kg (106 lb 4.2 oz)   SpO2 92%   Breastfeeding No   BMI 19.44 kg/m²         Indications for treatment: SOB, hx of COPD      Plan of care: Oneida Lamar BID        Goal: Improve SOB, COPD maintenance

## 2020-11-06 ENCOUNTER — APPOINTMENT (OUTPATIENT)
Dept: GENERAL RADIOLOGY | Age: 70
DRG: 193 | End: 2020-11-06
Attending: INTERNAL MEDICINE
Payer: MEDICARE

## 2020-11-06 LAB
ANION GAP SERPL CALC-SCNC: 7 MMOL/L (ref 3–18)
BUN SERPL-MCNC: 12 MG/DL (ref 7–18)
BUN/CREAT SERPL: 18 (ref 12–20)
CALCIUM SERPL-MCNC: 9.4 MG/DL (ref 8.5–10.1)
CHLORIDE SERPL-SCNC: 103 MMOL/L (ref 100–111)
CO2 SERPL-SCNC: 28 MMOL/L (ref 21–32)
CREAT SERPL-MCNC: 0.66 MG/DL (ref 0.6–1.3)
ERYTHROCYTE [DISTWIDTH] IN BLOOD BY AUTOMATED COUNT: 15.3 % (ref 11.6–14.5)
GLUCOSE BLD STRIP.AUTO-MCNC: 104 MG/DL (ref 70–110)
GLUCOSE BLD STRIP.AUTO-MCNC: 105 MG/DL (ref 70–110)
GLUCOSE BLD STRIP.AUTO-MCNC: 140 MG/DL (ref 70–110)
GLUCOSE BLD STRIP.AUTO-MCNC: 200 MG/DL (ref 70–110)
GLUCOSE SERPL-MCNC: 99 MG/DL (ref 74–99)
HCT VFR BLD AUTO: 30.6 % (ref 35–45)
HGB BLD-MCNC: 9.5 G/DL (ref 12–16)
MCH RBC QN AUTO: 29 PG (ref 24–34)
MCHC RBC AUTO-ENTMCNC: 31 G/DL (ref 31–37)
MCV RBC AUTO: 93.3 FL (ref 74–97)
PLATELET # BLD AUTO: 857 K/UL (ref 135–420)
PMV BLD AUTO: 9.1 FL (ref 9.2–11.8)
POTASSIUM SERPL-SCNC: 4.7 MMOL/L (ref 3.5–5.5)
RBC # BLD AUTO: 3.28 M/UL (ref 4.2–5.3)
SODIUM SERPL-SCNC: 138 MMOL/L (ref 136–145)
WBC # BLD AUTO: 13.9 K/UL (ref 4.6–13.2)

## 2020-11-06 PROCEDURE — 74011250636 HC RX REV CODE- 250/636: Performed by: INTERNAL MEDICINE

## 2020-11-06 PROCEDURE — 65660000000 HC RM CCU STEPDOWN

## 2020-11-06 PROCEDURE — 74011636637 HC RX REV CODE- 636/637: Performed by: INTERNAL MEDICINE

## 2020-11-06 PROCEDURE — 85027 COMPLETE CBC AUTOMATED: CPT

## 2020-11-06 PROCEDURE — 74011250637 HC RX REV CODE- 250/637: Performed by: INTERNAL MEDICINE

## 2020-11-06 PROCEDURE — 94761 N-INVAS EAR/PLS OXIMETRY MLT: CPT

## 2020-11-06 PROCEDURE — 80048 BASIC METABOLIC PNL TOTAL CA: CPT

## 2020-11-06 PROCEDURE — 36415 COLL VENOUS BLD VENIPUNCTURE: CPT

## 2020-11-06 PROCEDURE — 74011000258 HC RX REV CODE- 258: Performed by: INTERNAL MEDICINE

## 2020-11-06 PROCEDURE — 71046 X-RAY EXAM CHEST 2 VIEWS: CPT

## 2020-11-06 PROCEDURE — 74011250637 HC RX REV CODE- 250/637: Performed by: HOSPITALIST

## 2020-11-06 PROCEDURE — 94640 AIRWAY INHALATION TREATMENT: CPT

## 2020-11-06 PROCEDURE — 74011000250 HC RX REV CODE- 250: Performed by: INTERNAL MEDICINE

## 2020-11-06 PROCEDURE — 82962 GLUCOSE BLOOD TEST: CPT

## 2020-11-06 RX ORDER — GUAIFENESIN 100 MG/5ML
81 LIQUID (ML) ORAL DAILY
Status: DISCONTINUED | OUTPATIENT
Start: 2020-11-07 | End: 2020-11-07 | Stop reason: HOSPADM

## 2020-11-06 RX ADMIN — PANTOPRAZOLE SODIUM 40 MG: 40 TABLET, DELAYED RELEASE ORAL at 07:59

## 2020-11-06 RX ADMIN — INSULIN LISPRO 6 UNITS: 100 INJECTION, SOLUTION INTRAVENOUS; SUBCUTANEOUS at 18:03

## 2020-11-06 RX ADMIN — HEPARIN SODIUM 5000 UNITS: 5000 INJECTION INTRAVENOUS; SUBCUTANEOUS at 07:59

## 2020-11-06 RX ADMIN — METOPROLOL SUCCINATE 25 MG: 25 TABLET, EXTENDED RELEASE ORAL at 18:03

## 2020-11-06 RX ADMIN — Medication 4.5 MG: at 22:11

## 2020-11-06 RX ADMIN — ARFORMOTEROL TARTRATE 15 MCG: 15 SOLUTION RESPIRATORY (INHALATION) at 20:05

## 2020-11-06 RX ADMIN — IPRATROPIUM BROMIDE AND ALBUTEROL SULFATE 3 ML: .5; 3 SOLUTION RESPIRATORY (INHALATION) at 11:40

## 2020-11-06 RX ADMIN — FOLIC ACID 1 MG: 1 TABLET ORAL at 09:50

## 2020-11-06 RX ADMIN — ARFORMOTEROL TARTRATE 15 MCG: 15 SOLUTION RESPIRATORY (INHALATION) at 07:51

## 2020-11-06 RX ADMIN — CEFTRIAXONE 2 G: 2 INJECTION, POWDER, FOR SOLUTION INTRAMUSCULAR; INTRAVENOUS at 14:56

## 2020-11-06 RX ADMIN — IPRATROPIUM BROMIDE AND ALBUTEROL SULFATE 3 ML: .5; 3 SOLUTION RESPIRATORY (INHALATION) at 20:05

## 2020-11-06 RX ADMIN — AMLODIPINE BESYLATE 5 MG: 5 TABLET ORAL at 09:51

## 2020-11-06 RX ADMIN — HEPARIN SODIUM 5000 UNITS: 5000 INJECTION INTRAVENOUS; SUBCUTANEOUS at 18:03

## 2020-11-06 RX ADMIN — OXYCODONE HYDROCHLORIDE AND ACETAMINOPHEN 1 TABLET: 5; 325 TABLET ORAL at 22:21

## 2020-11-06 RX ADMIN — MONTELUKAST 10 MG: 10 TABLET, FILM COATED ORAL at 22:11

## 2020-11-06 RX ADMIN — Medication 10 ML: at 22:21

## 2020-11-06 RX ADMIN — Medication 10 ML: at 14:57

## 2020-11-06 RX ADMIN — INSULIN GLARGINE 8 UNITS: 100 INJECTION, SOLUTION SUBCUTANEOUS at 22:11

## 2020-11-06 RX ADMIN — FLUOXETINE 20 MG: 20 CAPSULE ORAL at 09:50

## 2020-11-06 RX ADMIN — BUSPIRONE HYDROCHLORIDE 7.5 MG: 7.5 TABLET ORAL at 09:50

## 2020-11-06 RX ADMIN — IPRATROPIUM BROMIDE AND ALBUTEROL SULFATE 3 ML: .5; 3 SOLUTION RESPIRATORY (INHALATION) at 07:51

## 2020-11-06 RX ADMIN — LOSARTAN POTASSIUM 100 MG: 50 TABLET ORAL at 09:50

## 2020-11-06 RX ADMIN — IPRATROPIUM BROMIDE AND ALBUTEROL SULFATE 3 ML: .5; 3 SOLUTION RESPIRATORY (INHALATION) at 15:52

## 2020-11-06 RX ADMIN — BUSPIRONE HYDROCHLORIDE 7.5 MG: 7.5 TABLET ORAL at 18:04

## 2020-11-06 RX ADMIN — PREDNISONE 40 MG: 5 TABLET ORAL at 09:50

## 2020-11-06 NOTE — PROGRESS NOTES
Internal Medicine Progress Note        NAME :  Akiko Pandya     :  1950  MRM  :  [id number]    Date/Time: 2020        ASSESSMENT/PLAN :      #  Bilateral , multifocal   Pneumonia with SIRS  : iv Abx cover CAP zithr and ceftriaxone . Urine antigen negative. Follow up on CXR for clearance. Blood CS NTD  maintain oxygenation . Control cough  CT chest report noted, no mention of possible picture of COVID.    repeat CXR with  slowly improving R UL pneumonia . Follow with PCP for complete resolution . Likely will dc on po levaquin for longer course of Antibiotic given her complicated lung history. # Immunosuppressed status due to Rheumatoid arthritis treatment       #  DM (diabetes mellitus) ([facility] (2020). Control blood sugar level. Provide SSI, hypoglycemia protocol and frequent Accu checks. Education . Diabetic Diet      #   Rheumatoid arthritis   follow with rheumatologist.     # Arthralgia multiple sites. Control pains . Add percocet . She requested pain medicine when go home until see her PCP.     #   Ho COPD (chronic obstructive pulmonary disease) ([facility] (2020) . Home regimen . Fu with Dr Nicole Del Angel , started on short course steroid- will help her arthritis possibly.        #  Hypokalemia (2020). Replete and trend. # Hypomagnesemia. Replete . # Thrombocytosis . ASA . Heparin for DVT ppx. Follow with pcp.  ? Due to inflammation         # Continue home regimen / Medications when appropriate.      -DVT prophylaxis.      - Code Status : FULL      Principal Problem:    Pneumonia (2020)    Active Problems:    Immunosuppressed status ([facility] (2/15/2020)      DM (diabetes mellitus) ([facility] (2020)      Rheumatoid arthritis ([facility] (2020)      SIRS (systemic inflammatory response syndrome) ([facility] (2020)      COPD (chronic obstructive pulmonary disease) ([facility] (2020)      Hypokalemia (2020)          None    Lab Review:     Recent Labs     20  0300 11/04/20  0400   WBC 13.9* 11.7   HGB 9.5* 8.9*   HCT 30.6* 28.1*   * 639*     Recent Labs     11/06/20  0300 11/05/20  0350 11/04/20  0400    138 140   K 4.7 4.4 3.4*    104 106   CO2 28 28 29   GLU 99 84 102*   BUN 12 5* 5*   CREA 0.66 0.50* 0.48*   CA 9.4 8.8 8.7   MG  --  1.7 1.6   PHOS  --   --  4.2     Lab Results   Component Value Date/Time    Glucose (POC) 105 11/06/2020 07:39 AM    Glucose (POC) 142 (H) 11/05/2020 08:42 PM    Glucose (POC) 172 (H) 11/05/2020 04:27 PM    Glucose (POC) 79 11/05/2020 11:24 AM    Glucose (POC) 101 11/05/2020 07:11 AM     No results for input(s): PH, PCO2, PO2, HCO3, FIO2 in the last 72 hours. No results for input(s): INR, INREXT, INREXT in the last 72 hours. No results found for: SDES  Lab Results   Component Value Date/Time    Culture result: NO GROWTH 3 DAYS 11/02/2020 02:25 PM    Culture result: NO GROWTH 3 DAYS 11/02/2020 02:12 PM    Culture result: NO GROWTH 6 DAYS 08/14/2020 01:30 PM       All Cardiac Markers in the last 24 hours: No results found for: CPK, CK, CKMMB, CKMB, RCK3, CKMBT, CKNDX, CKND1, MARTIN, TROPT, TROIQ, JO, TROPT, TNIPOC, BNP, BNPP        Intervals noted   Pt s/e @ bedside     Subjective:     Chief Complaint:       continue to get better, happy with progress. Arthralgia bad from her RA. But pain controlled     ROS:  No CP/ N/V/D/Pains/Bleeding/ acute joint swelling/rash/itching/fever/chills. Tolerating Diet                Objective:     Vitals:  Last 24hrs VS reviewed since prior progress note.  Most recent are:    Visit Vitals  BP (!) 157/69   Pulse 71   Temp 98 °F (36.7 °C)   Resp 18   Ht 5' 2\" (1.575 m)   Wt 48.2 kg (106 lb 4.2 oz)   SpO2 92%   Breastfeeding No   BMI 19.44 kg/m²     SpO2 Readings from Last 6 Encounters:   11/06/20 92%   08/19/20 91%   05/07/20 97%   02/17/20 91%            Intake/Output Summary (Last 24 hours) at 11/6/2020 1017  Last data filed at 11/5/2020 2203  Gross per 24 hour   Intake 1200 ml   Output    Net 1200 ml          Physical Exam:   Gen: frequent coughs,   Appear stated age, Well-developed,   in no acute distress  HEENT:  Head atraumatic, normocephalic , hearing intact to voice, moist mucous membranes. Neck:   Trachea midline , No apparent JVD, Supple,  thyroid non-tender  Resp: better AE, R basal fine crackles less , no  wheezes noted today    No accessory muscle use,Bilateral BS present   Card:    normal S1, S2 without Gallop . No Significant lower leg peripheral edema. Abd:  Soft, non-tender, non-distended, bowel sounds are present . Musc:  No cyanosis or clubbing. Skin:   Warm and dry . No rashes or ulcers, skin turgor is good. Neuro:  Cranial nerves are grossly intact, no clear area of focal motor weakness, follows commands appropriately. Psych:  Good insight, oriented to person, place and time, alert.     Medications Reviewed: (see below)    Lab Data Reviewed: (see below)    ______________________________________________________________________    Medications:     Current Facility-Administered Medications   Medication Dose Route Frequency    predniSONE (DELTASONE) tablet 40 mg  40 mg Oral DAILY    amLODIPine (NORVASC) tablet 5 mg  5 mg Oral DAILY    metoprolol succinate (TOPROL-XL) XL tablet 25 mg  25 mg Oral QPM    oxyCODONE-acetaminophen (PERCOCET) 5-325 mg per tablet 1 Tab  1 Tab Oral Q8H PRN    HYDROcodone-chlorpheniramine (TUSSIONEX) oral suspension 5 mL  5 mL Oral Q12H PRN    insulin glargine (LANTUS) injection 8 Units  8 Units SubCUTAneous QHS    losartan (COZAAR) tablet 100 mg  100 mg Oral DAILY    sodium chloride (NS) flush 5-10 mL  5-10 mL IntraVENous PRN    cefTRIAXone (ROCEPHIN) 2 g in 0.9% sodium chloride (MBP/ADV) 50 mL MBP  2 g IntraVENous Q24H    albuterol-ipratropium (DUO-NEB) 2.5 MG-0.5 MG/3 ML  3 mL Nebulization QID RT    folic acid (FOLVITE) tablet 1 mg  1 mg Oral DAILY    montelukast (SINGULAIR) tablet 10 mg  10 mg Oral DAILY    pantoprazole (PROTONIX) tablet 40 mg  40 mg Oral ACB    sodium chloride (NS) flush 5-40 mL  5-40 mL IntraVENous Q8H    sodium chloride (NS) flush 5-40 mL  5-40 mL IntraVENous PRN    heparin (porcine) injection 5,000 Units  5,000 Units SubCUTAneous Q8H    insulin lispro (HUMALOG) injection   SubCUTAneous AC&HS    glucose chewable tablet 16 g  4 Tab Oral PRN    glucagon (GLUCAGEN) injection 1 mg  1 mg IntraMUSCular PRN    dextrose (D50) infusion 12.5-25 g  25-50 mL IntraVENous PRN    arformoteroL (BROVANA) neb solution 15 mcg  15 mcg Nebulization BID RT    acetaminophen (TYLENOL) tablet 650 mg  650 mg Oral Q6H PRN    FLUoxetine (PROzac) capsule 20 mg  20 mg Oral DAILY    busPIRone (BUSPAR) tablet 7.5 mg  7.5 mg Oral BID    melatonin tablet 4.5 mg  4.5 mg Oral QHS          Total time spent with patient: 25 minutes                  Care Plan discussed with: Patient, Care Manager, Nursing Staff and >50% of time spent in counseling and coordination of care    Discussed:  Care Plan    Prophylaxis:  Hep SQ    Disposition:  Home w/Family           This document in whole or part of it has been produced using voice recognition software. Unrecognized errors in transcription may be present.     Attending Physician: Michael Lamas MD

## 2020-11-06 NOTE — PROGRESS NOTES
Problem: Discharge Planning  Goal: *Discharge to safe environment  Outcome: Resolved/Met   Home and outpt follow up    Care Management Interventions  PCP Verified by CM:  Yes  Mode of Transport at Discharge: Self  Transition of Care Consult (CM Consult): Discharge Planning  Current Support Network: Lives with Spouse, Own Home  Confirm Follow Up Transport: Self  Discharge Location  Discharge Placement: Home

## 2020-11-06 NOTE — ROUTINE PROCESS
0720  -- Bedside, Verbal and Written shift change report received by Themillicent Proper (oncoming nurse) by Sr. Valencia(offgoing nurse). Allergy band placed on pt's wrist. Report included the following information SBAR, Kardex, Intake/Output, MAR and Recent Results. 0800-Assessment completed, call bell within reach, am due medications given. 0950-- AM  medications administered, pt tolerated with ease, will continue to monitor. 1200-- Shift reassessment, pt condition unchanged, will continue to monitor. 1600 --  Shift reassessment, pt condition unchanged, will continue to monitor. 1935 -- Bedside, Verbal and Written shift change report given to sister Erik(oncoming nurse) by Rosanna Patel (offgoing nurse). Report included the following information SBAR, Kardex, Intake/Output, MAR and Recent Results. Skin assessment completed.

## 2020-11-06 NOTE — PROGRESS NOTES
Assumed care of pt from socoro. Pt in bed resting alert and oriented x3 denies pain at the moment. Assessement completed plan of care for the shift explained pt verbalized understanding. HOB elevated, bed low and in locked position. Call light and frequently used items within reach. 2220 Pt given percocet 1 po for neck and back pain 5/10. Pt given a snack  2330- Pt resting no concerns voiced  0130- Pt sleeping. 7526- Received a call from radiology to take pt for x-ray    0500- Pt taken for X-Ray via wheel chair. 0540- Pt back from radiology made comfortable in bed.    0740- Bedside and Verbal shift change report given to VLADIMIR Dobbs (oncoming nurse) by Juanis Cruz RN (offgoing nurse). Report included the following information SBAR, Kardex, Intake/Output, MAR and Recent Results.

## 2020-11-06 NOTE — PROGRESS NOTES
Problem: Pneumonia: Day 4  Goal: Off Pathway (Use only if patient is Off Pathway)  Outcome: Progressing Towards Goal  Goal: Activity/Safety  Outcome: Progressing Towards Goal  Goal: Nutrition/Diet  Outcome: Progressing Towards Goal  Goal: Discharge Planning  Outcome: Progressing Towards Goal  Goal: Medications  Outcome: Progressing Towards Goal  Goal: Respiratory  Outcome: Progressing Towards Goal  Goal: Treatments/Interventions/Procedures  Outcome: Progressing Towards Goal  Goal: Psychosocial  Outcome: Progressing Towards Goal     Problem: Pain  Goal: *Control of Pain  Outcome: Progressing Towards Goal     Problem: Falls - Risk of  Goal: *Absence of Falls  Description: Document Brittney Fall Risk and appropriate interventions in the flowsheet.   Outcome: Progressing Towards Goal  Note: Fall Risk Interventions:            Medication Interventions: Patient to call before getting OOB, Teach patient to arise slowly    Elimination Interventions: Call light in reach, Patient to call for help with toileting needs

## 2020-11-06 NOTE — PROGRESS NOTES
Problem: Pneumonia: Discharge Outcomes  Goal: *Demonstrates progressive activity  Outcome: Progressing Towards Goal  Goal: *Describes follow-up/return visits to physicians  Outcome: Progressing Towards Goal  Goal: *Tolerating diet  Outcome: Progressing Towards Goal  Goal: *Verbalizes name, dosage, time, side effects, and number of days to continue medications  Outcome: Progressing Towards Goal  Goal: *Influenza immunization  Outcome: Progressing Towards Goal  Goal: *Pneumococcal immunization  Outcome: Progressing Towards Goal  Goal: *Respiratory status at baseline  Outcome: Progressing Towards Goal  Goal: *Vital signs within defined limits  Outcome: Progressing Towards Goal  Goal: *Describes available resources and support systems  Outcome: Progressing Towards Goal  Goal: *Optimal pain control at patient's stated goal  Outcome: Progressing Towards Goal     Problem: Pneumonia: Day 3  Goal: Off Pathway (Use only if patient is Off Pathway)  Outcome: Progressing Towards Goal  Goal: Activity/Safety  Outcome: Progressing Towards Goal  Goal: Consults, if ordered  Outcome: Progressing Towards Goal  Goal: Diagnostic Test/Procedures  Outcome: Progressing Towards Goal  Goal: Nutrition/Diet  Outcome: Progressing Towards Goal  Goal: Discharge Planning  Outcome: Progressing Towards Goal  Goal: Medications  Outcome: Progressing Towards Goal  Goal: Respiratory  Outcome: Progressing Towards Goal  Goal: Treatments/Interventions/Procedures  Outcome: Progressing Towards Goal  Goal: Psychosocial  Outcome: Progressing Towards Goal  Goal: *Oxygen saturation within defined limits  Outcome: Progressing Towards Goal  Goal: *Hemodynamically stable  Outcome: Progressing Towards Goal  Goal: *Demonstrates progressive activity  Outcome: Progressing Towards Goal  Goal: *Tolerating diet  Outcome: Progressing Towards Goal  Goal: *Describes available resources and support systems  Outcome: Progressing Towards Goal  Goal: *Optimal pain control at patient's stated goal  Outcome: Progressing Towards Goal     Problem: Pain  Goal: *Control of Pain  Outcome: Progressing Towards Goal     Problem: Falls - Risk of  Goal: *Absence of Falls  Description: Document Brittney Fall Risk and appropriate interventions in the flowsheet.   Outcome: Progressing Towards Goal  Note: Fall Risk Interventions:            Medication Interventions: Bed/chair exit alarm, Evaluate medications/consider consulting pharmacy    Elimination Interventions: Call light in reach, Patient to call for help with toileting needs              Problem: Patient Education: Go to Patient Education Activity  Goal: Patient/Family Education  Outcome: Progressing Towards Goal

## 2020-11-07 VITALS
HEIGHT: 62 IN | DIASTOLIC BLOOD PRESSURE: 82 MMHG | HEART RATE: 69 BPM | OXYGEN SATURATION: 98 % | BODY MASS INDEX: 18.77 KG/M2 | TEMPERATURE: 98.2 F | WEIGHT: 102 LBS | SYSTOLIC BLOOD PRESSURE: 162 MMHG | RESPIRATION RATE: 18 BRPM

## 2020-11-07 LAB
ANION GAP SERPL CALC-SCNC: 6 MMOL/L (ref 3–18)
BUN SERPL-MCNC: 17 MG/DL (ref 7–18)
BUN/CREAT SERPL: 26 (ref 12–20)
CALCIUM SERPL-MCNC: 9.5 MG/DL (ref 8.5–10.1)
CHLORIDE SERPL-SCNC: 107 MMOL/L (ref 100–111)
CO2 SERPL-SCNC: 29 MMOL/L (ref 21–32)
CREAT SERPL-MCNC: 0.66 MG/DL (ref 0.6–1.3)
ERYTHROCYTE [DISTWIDTH] IN BLOOD BY AUTOMATED COUNT: 15.3 % (ref 11.6–14.5)
GLUCOSE BLD STRIP.AUTO-MCNC: 89 MG/DL (ref 70–110)
GLUCOSE BLD STRIP.AUTO-MCNC: 99 MG/DL (ref 70–110)
GLUCOSE SERPL-MCNC: 92 MG/DL (ref 74–99)
HCT VFR BLD AUTO: 28.9 % (ref 35–45)
HGB BLD-MCNC: 9.1 G/DL (ref 12–16)
MAGNESIUM SERPL-MCNC: 1.9 MG/DL (ref 1.6–2.6)
MCH RBC QN AUTO: 29.3 PG (ref 24–34)
MCHC RBC AUTO-ENTMCNC: 31.5 G/DL (ref 31–37)
MCV RBC AUTO: 92.9 FL (ref 74–97)
PLATELET # BLD AUTO: 916 K/UL (ref 135–420)
PMV BLD AUTO: 9.2 FL (ref 9.2–11.8)
POTASSIUM SERPL-SCNC: 4.6 MMOL/L (ref 3.5–5.5)
RBC # BLD AUTO: 3.11 M/UL (ref 4.2–5.3)
SODIUM SERPL-SCNC: 142 MMOL/L (ref 136–145)
WBC # BLD AUTO: 14.4 K/UL (ref 4.6–13.2)

## 2020-11-07 PROCEDURE — 83735 ASSAY OF MAGNESIUM: CPT

## 2020-11-07 PROCEDURE — 82962 GLUCOSE BLOOD TEST: CPT

## 2020-11-07 PROCEDURE — 74011000250 HC RX REV CODE- 250: Performed by: INTERNAL MEDICINE

## 2020-11-07 PROCEDURE — 2709999900 HC NON-CHARGEABLE SUPPLY

## 2020-11-07 PROCEDURE — 74011636637 HC RX REV CODE- 636/637: Performed by: INTERNAL MEDICINE

## 2020-11-07 PROCEDURE — 74011250637 HC RX REV CODE- 250/637: Performed by: HOSPITALIST

## 2020-11-07 PROCEDURE — 85027 COMPLETE CBC AUTOMATED: CPT

## 2020-11-07 PROCEDURE — 36415 COLL VENOUS BLD VENIPUNCTURE: CPT

## 2020-11-07 PROCEDURE — 94761 N-INVAS EAR/PLS OXIMETRY MLT: CPT

## 2020-11-07 PROCEDURE — 94640 AIRWAY INHALATION TREATMENT: CPT

## 2020-11-07 PROCEDURE — 74011250637 HC RX REV CODE- 250/637: Performed by: INTERNAL MEDICINE

## 2020-11-07 PROCEDURE — 74011250636 HC RX REV CODE- 250/636: Performed by: INTERNAL MEDICINE

## 2020-11-07 PROCEDURE — 80048 BASIC METABOLIC PNL TOTAL CA: CPT

## 2020-11-07 RX ORDER — AMLODIPINE BESYLATE 5 MG/1
5 TABLET ORAL DAILY
Qty: 30 TAB | Refills: 0 | Status: SHIPPED | OUTPATIENT
Start: 2020-11-08 | End: 2020-12-08

## 2020-11-07 RX ORDER — LEVOFLOXACIN 750 MG/1
750 TABLET ORAL EVERY 24 HOURS
Qty: 3 TAB | Refills: 0 | OUTPATIENT
Start: 2020-11-07 | End: 2021-02-17

## 2020-11-07 RX ORDER — LEVOFLOXACIN 750 MG/1
750 TABLET ORAL EVERY 24 HOURS
Status: DISCONTINUED | OUTPATIENT
Start: 2020-11-07 | End: 2020-11-07 | Stop reason: HOSPADM

## 2020-11-07 RX ORDER — LEVOFLOXACIN 750 MG/1
750 TABLET ORAL EVERY 24 HOURS
Qty: 3 TAB | Refills: 0 | Status: SHIPPED | OUTPATIENT
Start: 2020-11-07 | End: 2020-11-07

## 2020-11-07 RX ORDER — GUAIFENESIN 100 MG/5ML
81 LIQUID (ML) ORAL DAILY
Status: SHIPPED | COMMUNITY
Start: 2020-11-08

## 2020-11-07 RX ORDER — PREDNISONE 20 MG/1
40 TABLET ORAL DAILY
Qty: 2 TAB | Refills: 0 | Status: SHIPPED | OUTPATIENT
Start: 2020-11-08 | End: 2020-11-10

## 2020-11-07 RX ORDER — OXYCODONE AND ACETAMINOPHEN 5; 325 MG/1; MG/1
1 TABLET ORAL
Qty: 12 TAB | Refills: 0 | Status: SHIPPED | OUTPATIENT
Start: 2020-11-07 | End: 2020-11-10

## 2020-11-07 RX ADMIN — ARFORMOTEROL TARTRATE 15 MCG: 15 SOLUTION RESPIRATORY (INHALATION) at 07:50

## 2020-11-07 RX ADMIN — FLUOXETINE 20 MG: 20 CAPSULE ORAL at 09:10

## 2020-11-07 RX ADMIN — IPRATROPIUM BROMIDE AND ALBUTEROL SULFATE 3 ML: .5; 3 SOLUTION RESPIRATORY (INHALATION) at 11:41

## 2020-11-07 RX ADMIN — HEPARIN SODIUM 5000 UNITS: 5000 INJECTION INTRAVENOUS; SUBCUTANEOUS at 07:59

## 2020-11-07 RX ADMIN — AMLODIPINE BESYLATE 5 MG: 5 TABLET ORAL at 09:09

## 2020-11-07 RX ADMIN — ACETAMINOPHEN 650 MG: 325 TABLET, FILM COATED ORAL at 09:15

## 2020-11-07 RX ADMIN — PANTOPRAZOLE SODIUM 40 MG: 40 TABLET, DELAYED RELEASE ORAL at 07:59

## 2020-11-07 RX ADMIN — FOLIC ACID 1 MG: 1 TABLET ORAL at 09:10

## 2020-11-07 RX ADMIN — BUSPIRONE HYDROCHLORIDE 7.5 MG: 7.5 TABLET ORAL at 09:10

## 2020-11-07 RX ADMIN — LOSARTAN POTASSIUM 100 MG: 50 TABLET ORAL at 09:09

## 2020-11-07 RX ADMIN — IPRATROPIUM BROMIDE AND ALBUTEROL SULFATE 3 ML: .5; 3 SOLUTION RESPIRATORY (INHALATION) at 07:50

## 2020-11-07 RX ADMIN — HEPARIN SODIUM 5000 UNITS: 5000 INJECTION INTRAVENOUS; SUBCUTANEOUS at 00:49

## 2020-11-07 RX ADMIN — ASPIRIN 81 MG: 81 TABLET, CHEWABLE ORAL at 09:10

## 2020-11-07 RX ADMIN — PREDNISONE 40 MG: 5 TABLET ORAL at 09:09

## 2020-11-07 RX ADMIN — LEVOFLOXACIN 750 MG: 750 TABLET, FILM COATED ORAL at 12:11

## 2020-11-07 NOTE — PROGRESS NOTES
Problem: Pain  Goal: *Control of Pain  Outcome: Progressing Towards Goal     Problem: Falls - Risk of  Goal: *Absence of Falls  Description: Document Brittney Fall Risk and appropriate interventions in the flowsheet.   Outcome: Progressing Towards Goal  Note: Fall Risk Interventions:            Medication Interventions: Patient to call before getting OOB, Teach patient to arise slowly    Elimination Interventions: Call light in reach, Patient to call for help with toileting needs

## 2020-11-07 NOTE — PROGRESS NOTES
Problem: Discharge Planning  Goal: *Discharge to safe environment  Outcome: Resolved/Met   Home and outpt follow up     Pt dc'd home, no services ordered. Care Management Interventions  PCP Verified by CM:  Yes  Mode of Transport at Discharge: Self  Transition of Care Consult (CM Consult): Discharge Planning  Current Support Network: Lives with Spouse, Own Home  Confirm Follow Up Transport: Self  Discharge Location  Discharge Placement: Home

## 2020-11-07 NOTE — ROUTINE PROCESS
0725-- Bedside, Verbal and Written shift change report received by Koki Palacio (oncoming nurse) by Sr. Valencia (offgoing nurse). Allergy band placed on pt's wrist. Report included the following information SBAR, Kardex, Intake/Output, MAR and Recent Results. 0800-Assessment completed, call bell within reach, no distress noted, voiced no complaints at this time. 3404-- AM  medications administered, pt tolerated with ease, will continue to monitor. 1200 --  Shift reassessment, pt condition unchanged, will continue to monitor. 1400-discharge instructions given, verbalized understanding. 1440-discharged home via wheelchair with .

## 2020-11-07 NOTE — PROGRESS NOTES
1930- Bedside report given by VLADIMIR yun. Pt in bed resting no signs of distress noted or reported. Pt denies pain. Assessment completed plan of care for the shift explained pt verbalized understanding. HOB elevated, bed low and in locked position call light within reach. 2211- Scheduled med given and tolerated well.   1271-- Pt given percocet 1 tab for back and hip pain 5/10 will reassess. 2320- Pt stated pain is 1/10. Pt resting in bed. No concerns voiced. 0047- Pt awake to the  Bathroom voided 200 ml of clear urine. Pt weighted at this time. 102 lbs   Reassessment done no changes- will continue with the care. 9131- Pt sleeping no signs of distress noted. 0410- Shift Reassessment completed no changes in pt status. 1813- Bedside and Verbal shift change report given to Minerva Mercer RN (oncoming nurse) by Anh Virgen RN (offgoing nurse). Report included the following information SBAR, Kardex, Intake/Output, MAR and Recent Results. Rosy Sahni

## 2020-11-07 NOTE — DISCHARGE INSTRUCTIONS
Patient Education        Pneumonia: Care Instructions  Your Care Instructions     Pneumonia is an infection of the lungs. Most cases are caused by infections from bacteria or viruses. Pneumonia may be mild or very severe. If it is caused by bacteria, you will be treated with antibiotics. It may take a few weeks to a few months to recover fully from pneumonia, depending on how sick you were and whether your overall health is good. Follow-up care is a key part of your treatment and safety. Be sure to make and go to all appointments, and call your doctor if you are having problems. It's also a good idea to know your test results and keep a list of the medicines you take. How can you care for yourself at home? · Take your antibiotics exactly as directed. Do not stop taking the medicine just because you are feeling better. You need to take the full course of antibiotics. · Take your medicines exactly as prescribed. Call your doctor if you think you are having a problem with your medicine. · Get plenty of rest and sleep. You may feel weak and tired for a while, but your energy level will improve with time. · To prevent dehydration, drink plenty of fluids, enough so that your urine is light yellow or clear like water. Choose water and other caffeine-free clear liquids until you feel better. If you have kidney, heart, or liver disease and have to limit fluids, talk with your doctor before you increase the amount of fluids you drink. · Take care of your cough so you can rest. A cough that brings up mucus from your lungs is common with pneumonia. It is one way your body gets rid of the infection. But if coughing keeps you from resting or causes severe fatigue and chest-wall pain, talk to your doctor. He or she may suggest that you take a medicine to reduce the cough. · Use a vaporizer or humidifier to add moisture to your bedroom. Follow the directions for cleaning the machine.   · Do not smoke or allow others to smoke around you. Smoke will make your cough last longer. If you need help quitting, talk to your doctor about stop-smoking programs and medicines. These can increase your chances of quitting for good. · Take an over-the-counter pain medicine, such as acetaminophen (Tylenol), ibuprofen (Advil, Motrin), or naproxen (Aleve). Read and follow all instructions on the label. · Do not take two or more pain medicines at the same time unless the doctor told you to. Many pain medicines have acetaminophen, which is Tylenol. Too much acetaminophen (Tylenol) can be harmful. · If you were given a spirometer to measure how well your lungs are working, use it as instructed. This can help your doctor tell how your recovery is going. · To prevent pneumonia in the future, talk to your doctor about getting a flu vaccine (once a year) and a pneumococcal vaccine (one time only for most people). When should you call for help? Call 911 anytime you think you may need emergency care. For example, call if:    · You have severe trouble breathing. Call your doctor now or seek immediate medical care if:    · You cough up dark brown or bloody mucus (sputum).     · You have new or worse trouble breathing.     · You are dizzy or lightheaded, or you feel like you may faint. Watch closely for changes in your health, and be sure to contact your doctor if:    · You have a new or higher fever.     · You are coughing more deeply or more often.     · You are not getting better after 2 days (48 hours).     · You do not get better as expected. Where can you learn more? Go to http://oliver-alaina.info/  Enter D336 in the search box to learn more about \"Pneumonia: Care Instructions. \"  Current as of: February 24, 2020               Content Version: 12.6  © 0860-0051 [employer]. Care instructions adapted under license by [employer] (which disclaims liability or warranty for this information).  If you have questions about a medical condition or this instruction, always ask your healthcare professional. [address] any warranty or liability for your use of this information. Patient armband removed and shredded  DISCHARGE SUMMARY from Nurse    PATIENT INSTRUCTIONS:    After general anesthesia or intravenous sedation, for 24 hours or while taking prescription Narcotics:  · Limit your activities  · Do not drive and operate hazardous machinery  · Do not make important personal or business decisions  · Do  not drink alcoholic beverages  · If you have not urinated within 8 hours after discharge, please contact your surgeon on call. Report the following to your surgeon:  · Excessive pain, swelling, redness or odor of or around the surgical area  · Temperature over 100.5  · Nausea and vomiting lasting longer than 4 hours or if unable to take medications  · Any signs of decreased circulation or nerve impairment to extremity: change in color, persistent  numbness, tingling, coldness or increase pain  · Any questions    What to do at Home:  Recommended activity: Activity as tolerated,     If you experience any of the following symptoms symptoms worsening symptoms, fever greater than 100.5 please follow up with PCP or call 911. *  Please give a list of your current medications to your Primary Care Provider. *  Please update this list whenever your medications are discontinued, doses are      changed, or new medications (including over-the-counter products) are added. *  Please carry medication information at all times in case of emergency situations. These are general instructions for a healthy lifestyle:    No smoking/ No tobacco products/ Avoid exposure to second hand smoke  Surgeon General's Warning:  Quitting smoking now greatly reduces serious risk to your health.     Obesity, smoking, and sedentary lifestyle greatly increases your risk for illness    A healthy diet, regular physical exercise & weight monitoring are important for maintaining a healthy lifestyle    You may be retaining fluid if you have a history of heart failure or if you experience any of the following symptoms:  Weight gain of 3 pounds or more overnight or 5 pounds in a week, increased swelling in our hands or feet or shortness of breath while lying flat in bed. Please call your doctor as soon as you notice any of these symptoms; do not wait until your next office visit. The discharge information has been reviewed with the patient. The patient verbalized understanding. Discharge medications reviewed with the patient and appropriate educational materials and side effects teaching were provided.   ___________________________________________________________________________________________________________________________________   get the flu vaccine in about a week per MD.

## 2020-11-07 NOTE — DISCHARGE SUMMARY
Discharge Summary      [facility] service    Patient ID:  Andree Calles, 69 y.o., female  : 1950    Admit Date: 2020  Discharge Date:  2020  Length of stay: 5 day(s)    PCP:  Darrell Villegas NP    Chief Complaint   Patient presents with    Cough     Discharge Diagnosis:     Hospital Problems  Never Reviewed          Codes Class Noted POA    SIRS (systemic inflammatory response syndrome) ([facility] ICD-10-CM: R65.10  ICD-9-CM: 995.90  2020 Yes        COPD (chronic obstructive pulmonary disease) ([facility] ICD-10-CM: J44.9  ICD-9-CM: 831  2020 Yes        Hypokalemia ICD-10-CM: E87.6  ICD-9-CM: 276.8  2020 Yes        Rheumatoid arthritis ([facility] ICD-10-CM: M06.9  ICD-9-CM: 714.0  2020 Yes        DM (diabetes mellitus) ([facility] ICD-10-CM: E11.9  ICD-9-CM: 250.00  2020 Yes        * (Principal) Pneumonia ICD-10-CM: J18.9  ICD-9-CM: 650  2020 Yes        Immunosuppressed status ([facility] ICD-10-CM: D84.9  ICD-9-CM: 279.9  2/15/2020 Yes              Discharge instructions:    #  Discharge Diet:            Diet: Resume previous diet  # Discharge activity and restrictions: Activity as tolerated and PT/OT Eval and Treat    # Follow-up appointments: Follow-up Information     Follow up With Specialties Details Why Contact Info    Darrell Villegas NP Nurse Practitioner In 1 week  [facility]  [phone]           HPI on Admission (per admitting physician):  Ms. Peewee Lee is a 69 y.o.  female who is admitted for Pneumonia. Ms. Peewee Lee with past medical history as noted below , presented to the Emergency Department today  complaining of cough and fever for 5-6 days/ Triage and ER notes noted. ER MD wanted to admit the patient to the hospital for further management and called hospitalist to facilitate this process. Patient know to this hospital with COPD admissions. Report been feverish with temp 102.  Lives with her  who is feeling well. Deny exposure to COVID or any risk of that. Her SOB been increasing last few days ( no wheezes) went to urgent care today as her PCP couldn't see her today , at urgent care her CXR showed pneumonia as been told and her wbc was up so they sent her to ER. She been treated with actemra by her rheumatologist who treated her with low dose. HR in ER on presentation was 111. Given CFTX and Zithro. She follow with Dr Meli Simon. Given IVF. Sat 96 ./.. CPVID TEST  Negative at Urgent care. For further details and initial management please refer to H/P. Hospital Course:      Patient feel she is at her baseline now and willing to go home. Short course of po Abx and steroid to be continued at home. Requested few tab of pain medicine for her arthritic pains until see her PCP. platlet still high , po ASA and repeat cbc with PCP. Will get PT to se her prior D/C. By Problems :     #  Bilateral , multifocal   Pneumonia with SIRS  : iv Abx cover CAP zithr and ceftriaxone on admission . Urine antigen negative. Follow up on CXR for clearance with PCP. Blood CS NTD. maintain oxygenation . Control cough. CT chest report noted, no mention of possible picture of COVID. Repeat CXR with  slowly improving R UL pneumonia . Follow with PCP for complete resolution .       # Immunosuppressed status due to Rheumatoid arthritis treatment. Avoid immunosuppression until complete resolution of her chest infection. Review flue vaccine and pneumonia vaccine with PCP.       #  DM (diabetes mellitus) ([facility] (5/4/2020).  Control blood sugar level.   Provide SSI, hypoglycemia protocol and frequent Accu checks.  Education . Diabetic Diet      #   Rheumatoid arthritis   follow with rheumatologist.      # Arthralgia multiple sites. Control pains . She requested pain medicine when go home until see her PCP.    #   Ho COPD (chronic obstructive pulmonary disease)  . Home regimen . Fu with Dr Jerri Duvall.   Wheezes , started on short course steroid- will help her arthritis possibly.        #  Hypokalemia (11/2/2020). Replete and trend.      # Hypomagnesemia. Replete .     # Thrombocytosis . ASA . Heparin for DVT ppx. Follow with pcp. ? Due to inflammation . Repeat CBC with PCP. Discharge condition:  improved and stable    Disposition:  Home    Procedures:   * No surgery found *       Physical Exam on Discharge:  Visit Vitals  BP (!) 147/80 (BP 1 Location: Right arm, BP Patient Position: At rest)   Pulse 73   Temp 97.7 °F (36.5 °C)   Resp 18   Ht 5' 2\" (1.575 m)   Wt 46.3 kg (102 lb)   SpO2 94%   Breastfeeding No   BMI 18.66 kg/m²     Gen: Appear stated age, Well-developed,   in no acute distress  HEENT:  Head atraumatic, normocephalic , hearing intact to voice, moist mucous membranes. Neck:   Trachea midline , No apparent JVD, Supple,  thyroid non-tender. Resp:  much better AE, R basal fine crackles improved, no  wheezes. No accessory muscle use,Bilateral BS present   Card:    normal S1, S2 without Gallop . No Significant lower leg peripheral edema. Abd:  Soft, non-tender, non-distended, bowel sounds are present . Musc:  No cyanosis or clubbing. Skin:   Warm and dry . No rashes or ulcers, skin turgor is good. Neuro:  Cranial nerves are grossly intact, no clear area of focal motor weakness, follows commands appropriately. alert. Hospitalization and discharge instructions d/c in details with : patient , Nursing/CM     Discharge medications :  Current Discharge Medication List      START taking these medications    Details   amLODIPine (NORVASC) 5 mg tablet Take 1 Tab by mouth daily for 30 days. Qty: 30 Tab, Refills: 0      aspirin 81 mg chewable tablet Take 1 Tab by mouth daily. Qty:        levoFLOXacin (LEVAQUIN) 750 mg tablet Take 1 Tab by mouth every twenty-four (24) hours. Qty: 3 Tab, Refills: 0      oxyCODONE-acetaminophen (PERCOCET) 5-325 mg per tablet Take 1 Tab by mouth every eight (8) hours as needed for Pain for up to 3 days.  Max Daily Amount: 3 Tabs. Qty: 12 Tab, Refills: 0    Associated Diagnoses: Rheumatoid arthritis involving vertebra, unspecified whether rheumatoid factor present ([facility]         CONTINUE these medications which have CHANGED    Details   predniSONE (DELTASONE) 20 mg tablet Take 40 mg by mouth daily for 2 days. Qty: 2 Tab, Refills: 0         CONTINUE these medications which have NOT CHANGED    Details   tiotropium (Spiriva with HandiHaler) 18 mcg inhalation capsule Take 1 Cap by inhalation daily. cholecalciferol, vitamin D3, (Vitamin D3) 50 mcg (2,000 unit) tab Take 2,000 Units by mouth.      vitamin Q-R-Z-lutein-minerals (OCUVITE) tablet 1 Tab daily. losartan (COZAAR) 100 mg tablet Take 100 mg by mouth daily. metoprolol succinate (TOPROL-XL) 25 mg XL tablet Take 25 mg by mouth every evening. fluticasone propion-salmeteroL (ADVAIR DISKUS) 250-50 mcg/dose diskus inhaler Take 1 Puff by inhalation every twelve (12) hours. montelukast (SINGULAIR) 10 mg tablet Take 10 mg by mouth daily. albuterol (PROAIR HFA) 90 mcg/actuation inhaler Take  by inhalation. loratadine (CLARITIN) 10 mg tablet Take 10 mg by mouth. FLUoxetine (PROZAC) 20 mg capsule Take  by mouth daily. omeprazole (PRILOSEC) 20 mg capsule Take 20 mg by mouth daily. busPIRone (BUSPAR) 7.5 mg tablet Take 7.5 mg by mouth two (2) times a day. melatonin 5 mg tablet Take  by mouth nightly. 10mg prn at night      potassium chloride (K-DUR, KLOR-CON) 20 mEq tablet Take 2 Tabs by mouth two (2) times a day. Qty: 2 Tab, Refills: 0      zoledronic acid (RECLAST) 5 mg/100 mL pgbk 5 mg by IntraVENous route once. Once per year      metFORMIN (GLUCOPHAGE) 500 mg tablet Take  by mouth two (2) times daily (with meals).          STOP taking these medications       abatacept (ORENCIA) 250 mg injection Comments:   Reason for Stopping:         methotrexate (RHEUMATREX) 2.5 mg tablet Comments:   Reason for Stopping:         folic acid (FOLVITE) 1 mg tablet Comments:   Reason for Stopping:              Most Recent Labs:     Recent Labs     11/07/20  0425 11/06/20  0300   WBC 14.4* 13.9*   HGB 9.1* 9.5*   HCT 28.9* 30.6*   * 857*     Recent Labs     11/07/20  0425 11/06/20  0300 11/05/20  0350    138 138   K 4.6 4.7 4.4    103 104   CO2 29 28 28   GLU 92 99 84   BUN 17 12 5*   CREA 0.66 0.66 0.50*   CA 9.5 9.4 8.8   MG 1.9  --  1.7     Lab Results   Component Value Date/Time    Glucose (POC) 89 11/07/2020 07:22 AM    Glucose (POC) 140 (H) 11/06/2020 09:23 PM    Glucose (POC) 200 (H) 11/06/2020 04:07 PM    Glucose (POC) 104 11/06/2020 11:19 AM    Glucose (POC) 105 11/06/2020 07:39 AM     No results for input(s): PH, PCO2, PO2, HCO3, FIO2 in the last 72 hours. No results for input(s): INR, INREXT in the last 72 hours. No results found for: SDES  Lab Results   Component Value Date/Time    Culture result: NO GROWTH 5 DAYS 11/02/2020 02:25 PM    Culture result: NO GROWTH 5 DAYS 11/02/2020 02:12 PM    Culture result: NO GROWTH 6 DAYS 08/14/2020 01:30 PM     All Cardiac Markers in the last 24 hours: No results found for: CPK, CK, CKMMB, CKMB, RCK3, CKMBT, CKNDX, CKND1, MARTIN, TROPT, TROIQ, JO, TROPT, TNIPOC, BNP, BNPP    XR Results:  Results from Hospital Encounter encounter on 11/02/20   XR CHEST PA LAT    Narrative EXAM: CHEST, TWO VIEWS    CLINICAL INDICATION/HISTORY: SOB    > Additional: None. COMPARISON: Single view chest 11/2/2020    > Reference Exam: None. TECHNIQUE: PA and lateral views of the chest were obtained.    _______________    FINDINGS:    SUPPORT DEVICES: None. HEART AND MEDIASTINUM: Cardiac mediastinal silhouette appears within normal  limits. Normal caliber thoracic aorta. No central vascular congestion. LUNGS AND PLEURAL SPACES: Improvement but incomplete clearing peripheral right  upper lobe infiltrate. Continued increased markings retrocardiac region on the  left.  There was some scarring in this location previously. Unclear if there is  acute superimposed infiltrate or if this is all chronic. Stable mild blunting  left costophrenic angle secondary to pleural thickening. BONY THORAX AND SOFT TISSUES: No acute osseous abnormality. _______________      Impression IMPRESSION:      1. Improvement but incomplete clearing right upper lobe infiltrate. 2. Increased markings left lower lobe which may represent scarring however acute  superimposed infiltrate is not excluded. CT Results:  Results from [place] encounter on 11/02/20   CT CHEST WO CONT    Narrative CT CHEST:     COMPARISON: CT thorax 7/6/2020. INDICATION: Bilateral pneumonia    TECHNIQUE: Volumetric acquisition was performed through the chest without  contrast.  Coronal and sagittal reformations were generated. One or more dose reduction techniques were used on this CT: automated exposure  control, adjustment of the mAs and/or kVp according to patient's size, and  iterative reconstruction techniques. The specific techniques utilized on this CT  exam have been documented in the patient's electronic medical record. Digital Imaging and Communications in Medicine (DICOM) format image data are  available to nonaffiliated external healthcare facilities or entities on a  secure, media free, reciprocally searchable basis with patient authorization for  at least a 12-month period after this study.  ============    LUNGS: Extensive airspace disease right upper lobe as well as parts of the left  lower lobe adjacent to the diaphragm. Smaller ill-defined infiltrates are also  seen in the rest of the lungs. Lungs are hyperinflated with mild peripheral interstitial densities most likely  chronic fibrosis. PLEURA: Normal, with no effusion or pneumothorax. AIRWAY: Bronchiectasis noted in the lower lobes particularly the left side  adjacent to the consolidations. Mild bronchial wall thickening.     MEDIASTINUM: Normal heart size with coronary arterial calcifications with  minimal pericardial effusion/thickening. THORACIC AORTA: Mildly tortuous with atherosclerotic calcifications without  aneurysmal dilatation . LYMPH NODES: Multiple small and borderline sized mediastinal and probable hilar  lymph nodes increasing in size and number compared to the prior study. UPPER ABDOMEN: Atherosclerotic calcifications proximal abdominal aorta. Gallstone. OTHER: No significant osseous abnormality.    ============      Impression IMPRESSION:    Large area of patchy consolidations noted throughout the right upper lobe with a  more confluent area of consolidation with air bronchograms in the left lower  lobe adjacent to the diaphragm most likely representing pneumonias. There may be  minimal patchy infiltrates in the rest of the lungs as well. Lungs are hyperinflated likely due to underlying emphysema with bronchial wall  thickening and mild peripheral interstitial prominence most likely due to  chronic bronchitis and post inflammatory fibrosis. Small and borderline sized mediastinal and probable hilar lymph nodes slightly  increasing in prominence compared to the prior study of 7/6/2020. These are  likely reactive but remain nonspecific. Cholelithiasis. Total discharge time 34 minutes of which more than 50 % spent on counseling and coordination of care. Matt Posadas MD  Hospitalist  [facility]. medical group. Hospitalist Division.   November 7, 2020  10:33 AM

## 2020-11-07 NOTE — PROGRESS NOTES
Respiratory Therapy Assessment Care Plan    Patient: Merry Leigh 69 y.o. female 11/7/2020 10:31 AM    Pneumonia [J18.9]      Chest X-RAY:   Results from Hospital Encounter encounter on 11/02/20   XR CHEST PA LAT    Impression IMPRESSION:      1. Improvement but incomplete clearing right upper lobe infiltrate. 2. Increased markings left lower lobe which may represent scarring however acute  superimposed infiltrate is not excluded. XR CHEST PORT    Impression Impression:    Right upper lobe and left lower lobe infiltrate, likely pneumonia. Recommend  follow-up chest x-ray to document resolution. Results from [place] encounter on 08/14/20   XR CHEST PA LAT    Impression IMPRESSION:    Diffuse interstitial opacities with subtle right peripheral opacities.            Vital Signs:   Visit Vitals  BP (!) 147/80 (BP 1 Location: Right arm, BP Patient Position: At rest)   Pulse 73   Temp 97.7 °F (36.5 °C)   Resp 18   Ht 5' 2\" (1.575 m)   Wt 46.3 kg (102 lb)   SpO2 94%   Breastfeeding No   BMI 18.66 kg/m²         Indications for treatment: Hx: COPD      Plan of care: Duoneb QID/ Pulmicort BID        Goal: COPD Maintenane

## 2020-11-07 NOTE — PROGRESS NOTES
Problem: Patient Education: Go to Patient Education Activity  Goal: Patient/Family Education  Outcome: Progressing Towards Goal     Problem: Pneumonia: Discharge Outcomes  Goal: *Demonstrates progressive activity  Outcome: Progressing Towards Goal  Goal: *Describes follow-up/return visits to physicians  Outcome: Progressing Towards Goal  Goal: *Tolerating diet  Outcome: Progressing Towards Goal  Goal: *Verbalizes name, dosage, time, side effects, and number of days to continue medications  Outcome: Progressing Towards Goal  Goal: *Influenza immunization  Outcome: Progressing Towards Goal  Goal: *Pneumococcal immunization  Outcome: Progressing Towards Goal  Goal: *Respiratory status at baseline  Outcome: Progressing Towards Goal  Goal: *Vital signs within defined limits  Outcome: Progressing Towards Goal  Goal: *Describes available resources and support systems  Outcome: Progressing Towards Goal  Goal: *Optimal pain control at patient's stated goal  Outcome: Progressing Towards Goal     Problem: Pneumonia: Day 3  Goal: Off Pathway (Use only if patient is Off Pathway)  Outcome: Progressing Towards Goal  Goal: Activity/Safety  Outcome: Progressing Towards Goal  Goal: Consults, if ordered  Outcome: Progressing Towards Goal  Goal: Diagnostic Test/Procedures  Outcome: Progressing Towards Goal  Goal: Nutrition/Diet  Outcome: Progressing Towards Goal  Goal: Discharge Planning  Outcome: Progressing Towards Goal  Goal: Medications  Outcome: Progressing Towards Goal  Goal: Respiratory  Outcome: Progressing Towards Goal  Goal: Treatments/Interventions/Procedures  Outcome: Progressing Towards Goal  Goal: Psychosocial  Outcome: Progressing Towards Goal  Goal: *Oxygen saturation within defined limits  Outcome: Progressing Towards Goal  Goal: *Hemodynamically stable  Outcome: Progressing Towards Goal  Goal: *Demonstrates progressive activity  Outcome: Progressing Towards Goal  Goal: *Tolerating diet  Outcome: Progressing Towards Goal  Goal: *Describes available resources and support systems  Outcome: Progressing Towards Goal  Goal: *Optimal pain control at patient's stated goal  Outcome: Progressing Towards Goal     Problem: Pain  Goal: *Control of Pain  Outcome: Progressing Towards Goal     Problem: Patient Education: Go to Patient Education Activity  Goal: Patient/Family Education  Outcome: Progressing Towards Goal     Problem: Falls - Risk of  Goal: *Absence of Falls  Description: Document Veatrice Marker Fall Risk and appropriate interventions in the flowsheet.   Outcome: Progressing Towards Goal  Note: Fall Risk Interventions:        Medication Interventions: Patient to call before getting OOB    Elimination Interventions: Call light in reach, Patient to call for help with toileting needs

## 2020-11-09 ENCOUNTER — PATIENT OUTREACH (OUTPATIENT)
Dept: CASE MANAGEMENT | Age: 70
End: 2020-11-09

## 2020-11-09 NOTE — PROGRESS NOTES
Patient was admitted to [facility] on 20 and discharged on 20 for pneumonia. Outreach made within 2 business days of discharge: Yes    Top Discharge Challenges to be reviewed by the provider   Additional needs identified to be addressed with provider no  none  Discussed COVID-19 related testing which was not done at this time. Test results were not done. Patient informed of results, if available? no   Method of communication with provider : none       Advance Care Planning:   Does patient have an Advance Directive:  yes; reviewed and current     Inpatient Readmission Risk score: 6  Was this a readmission? no   Patient stated reason for the admission: fever generally felt poor  Patients top risk factors for readmission: medical condition  Interventions to address risk factors: education and support    Care Transition Nurse (CTN) contacted the patient by telephone to perform post hospital discharge assessment. Verified name and  with patient as identifiers. Provided introduction to self, and explanation of the CTN role. CTN reviewed discharge instructions, medical action plan and red flags with patient who verbalized understanding. Patient given an opportunity to ask questions and does not have any further questions or concerns at this time. The patient agrees to contact the PCP office for questions related to their healthcare. Medication reconciliation was performed with patient, who verbalizes understanding of administration of home medications. Advised obtaining a 90-day supply of all daily and as-needed medications. Referral to Pharm D needed: no     Home Health/Outpatient orders at discharge: none    Durable Medical Equipment ordered at discharge: none    Covid Risk Education    Patient has following risk factors of: pneumonia.  Education provided regarding infection prevention, and signs and symptoms of COVID-19 and when to seek medical attention with patient who verbalized understanding. Discussed exposure protocols and quarantine From CDC: Are you at higher risk for severe illness?  and given an opportunity for questions and concerns. The patient agrees to contact the COVID-19 hotline [phone] or PCP office for questions related to COVID-19. For more information on steps you can take to protect yourself, see CDC's How to Protect Yourself     Patient/family/caregiver given information for [employer] and agrees to enroll no  Patient's preferred e-mail: declines  Patient's preferred phone number: declines    Discussed follow-up appointments. If no appointment was previously scheduled, appointment scheduling offered: [facility] follow up appointment(s): No future appointments. Non-[facility] follow up appointment(s): PCP follow up 11/10/20  Pulmonologist 11/11/20  Plan for follow-up call in 7-10 days based on severity of symptoms and risk factors. CTN provided contact information for future needs. Goals Addressed                 This Visit's Progress     Prevent complications post hospitalization. 1. CTN will monitor X 4 weeks    2. Ensure provider appt is scheduled within 7 days post-discharge    3. Confirm patient attended post-discharge provider apt    4. Complete post-visit call to confirm attendance and update care needs      5. Review/educate common or potential \"red flags\" of condition worsening    6. Evaluate adherence to medications and priority barriers to resolve        7. Instruct on adherence to medications as ordered and assess for therapeutic response and side-effects         8. Discuss and evaluate ADL performance. Provide recommendations on energy conservation, particularly related to transition home from an inpatient admission.

## 2020-11-23 ENCOUNTER — PATIENT OUTREACH (OUTPATIENT)
Dept: CASE MANAGEMENT | Age: 70
End: 2020-11-23

## 2020-11-23 NOTE — PROGRESS NOTES
11/23/2020 2:19 PM     CTN attempted to contact patient for routine follow up. Message left introducing myself, the purpose of the call and giving my contact information. Requested that patient call back at her earliest convenience.

## 2020-12-07 ENCOUNTER — HOSPITAL ENCOUNTER (OUTPATIENT)
Dept: GENERAL RADIOLOGY | Age: 70
Discharge: HOME | End: 2020-12-07
Payer: MEDICARE

## 2020-12-07 ENCOUNTER — TRANSCRIBE ORDER (OUTPATIENT)
Dept: REGISTRATION | Age: 70
End: 2020-12-07

## 2020-12-07 DIAGNOSIS — J18.8 OTHER PNEUMONIA, UNSPECIFIED ORGANISM: ICD-10-CM

## 2020-12-07 DIAGNOSIS — J18.8 OTHER PNEUMONIA, UNSPECIFIED ORGANISM: Primary | ICD-10-CM

## 2020-12-07 PROCEDURE — 71046 X-RAY EXAM CHEST 2 VIEWS: CPT

## 2021-01-07 ENCOUNTER — TRANSCRIBE ORDER (OUTPATIENT)
Dept: REGISTRATION | Age: 71
End: 2021-01-07

## 2021-01-07 ENCOUNTER — HOSPITAL ENCOUNTER (OUTPATIENT)
Dept: GENERAL RADIOLOGY | Age: 71
Discharge: HOME | End: 2021-01-07
Payer: MEDICARE

## 2021-01-07 DIAGNOSIS — J18.8 OTHER PNEUMONIA, UNSPECIFIED ORGANISM: ICD-10-CM

## 2021-01-07 DIAGNOSIS — J18.8 OTHER PNEUMONIA, UNSPECIFIED ORGANISM: Primary | ICD-10-CM

## 2021-01-07 PROCEDURE — 71046 X-RAY EXAM CHEST 2 VIEWS: CPT

## 2021-02-17 ENCOUNTER — APPOINTMENT (OUTPATIENT)
Dept: GENERAL RADIOLOGY | Age: 71
End: 2021-02-17
Attending: EMERGENCY MEDICINE
Payer: MEDICARE

## 2021-02-17 ENCOUNTER — APPOINTMENT (OUTPATIENT)
Dept: CT IMAGING | Age: 71
End: 2021-02-17
Attending: EMERGENCY MEDICINE
Payer: MEDICARE

## 2021-02-17 ENCOUNTER — HOSPITAL ENCOUNTER (EMERGENCY)
Age: 71
Discharge: HOME | End: 2021-02-17
Attending: EMERGENCY MEDICINE
Payer: MEDICARE

## 2021-02-17 VITALS
OXYGEN SATURATION: 96 % | BODY MASS INDEX: 19.32 KG/M2 | HEIGHT: 62 IN | HEART RATE: 99 BPM | WEIGHT: 105 LBS | DIASTOLIC BLOOD PRESSURE: 59 MMHG | RESPIRATION RATE: 15 BRPM | SYSTOLIC BLOOD PRESSURE: 121 MMHG | TEMPERATURE: 98.4 F

## 2021-02-17 DIAGNOSIS — J18.9 PNEUMONIA DUE TO INFECTIOUS ORGANISM, UNSPECIFIED LATERALITY, UNSPECIFIED PART OF LUNG: Primary | ICD-10-CM

## 2021-02-17 LAB
ALBUMIN SERPL-MCNC: 3.5 G/DL (ref 3.4–5)
ALBUMIN/GLOB SERPL: 0.8 {RATIO} (ref 0.8–1.7)
ALP SERPL-CCNC: 73 U/L (ref 45–117)
ALT SERPL-CCNC: 22 U/L (ref 13–56)
ANION GAP SERPL CALC-SCNC: 10 MMOL/L (ref 3–18)
APPEARANCE UR: CLEAR
AST SERPL-CCNC: 14 U/L (ref 10–38)
BASOPHILS # BLD: 0 K/UL (ref 0–0.1)
BASOPHILS NFR BLD: 0 % (ref 0–2)
BILIRUB SERPL-MCNC: 0.3 MG/DL (ref 0.2–1)
BILIRUB UR QL: NEGATIVE
BNP SERPL-MCNC: 415 PG/ML (ref 0–900)
BUN SERPL-MCNC: 13 MG/DL (ref 7–18)
BUN/CREAT SERPL: 14 (ref 12–20)
CALCIUM SERPL-MCNC: 9.9 MG/DL (ref 8.5–10.1)
CHLORIDE SERPL-SCNC: 104 MMOL/L (ref 100–111)
CO2 SERPL-SCNC: 25 MMOL/L (ref 21–32)
COLOR UR: YELLOW
COVID-19 RAPID TEST, COVR: NOT DETECTED
CREAT SERPL-MCNC: 0.94 MG/DL (ref 0.6–1.3)
D DIMER PPP FEU-MCNC: 2.37 UG/ML(FEU)
DIFFERENTIAL METHOD BLD: ABNORMAL
EOSINOPHIL # BLD: 0.3 K/UL (ref 0–0.4)
EOSINOPHIL NFR BLD: 1 % (ref 0–5)
ERYTHROCYTE [DISTWIDTH] IN BLOOD BY AUTOMATED COUNT: 13.9 % (ref 11.6–14.5)
FERRITIN SERPL-MCNC: 341 NG/ML (ref 8–388)
GLOBULIN SER CALC-MCNC: 4.5 G/DL (ref 2–4)
GLUCOSE SERPL-MCNC: 143 MG/DL (ref 74–99)
GLUCOSE UR STRIP.AUTO-MCNC: NEGATIVE MG/DL
HCT VFR BLD AUTO: 37.5 % (ref 35–45)
HGB BLD-MCNC: 11.8 G/DL (ref 12–16)
HGB UR QL STRIP: NEGATIVE
INR PPP: 1 (ref 0.8–1.2)
KETONES UR QL STRIP.AUTO: NEGATIVE MG/DL
LACTATE BLD-SCNC: 3.75 MMOL/L (ref 0.4–2)
LEUKOCYTE ESTERASE UR QL STRIP.AUTO: NEGATIVE
LYMPHOCYTES # BLD: 1.9 K/UL (ref 0.9–3.6)
LYMPHOCYTES NFR BLD: 10 % (ref 21–52)
MAGNESIUM SERPL-MCNC: 0.6 MG/DL (ref 1.6–2.6)
MCH RBC QN AUTO: 29.6 PG (ref 24–34)
MCHC RBC AUTO-ENTMCNC: 31.5 G/DL (ref 31–37)
MCV RBC AUTO: 94.2 FL (ref 74–97)
MONOCYTES # BLD: 2.2 K/UL (ref 0.05–1.2)
MONOCYTES NFR BLD: 11 % (ref 3–10)
NEUTS SEG # BLD: 15.7 K/UL (ref 1.8–8)
NEUTS SEG NFR BLD: 78 % (ref 40–73)
NITRITE UR QL STRIP.AUTO: NEGATIVE
PH UR STRIP: 5 [PH] (ref 5–8)
PLATELET # BLD AUTO: 762 K/UL (ref 135–420)
PMV BLD AUTO: 9.4 FL (ref 9.2–11.8)
POTASSIUM SERPL-SCNC: 3.1 MMOL/L (ref 3.5–5.5)
PROCALCITONIN SERPL-MCNC: <0.05 NG/ML
PROT SERPL-MCNC: 8 G/DL (ref 6.4–8.2)
PROT UR STRIP-MCNC: NEGATIVE MG/DL
PROTHROMBIN TIME: 13.4 SEC (ref 11.5–15.2)
RBC # BLD AUTO: 3.98 M/UL (ref 4.2–5.3)
SODIUM SERPL-SCNC: 139 MMOL/L (ref 136–145)
SOURCE, COVRS: NORMAL
SP GR UR REFRACTOMETRY: 1.02 (ref 1–1.03)
TROPONIN I SERPL-MCNC: <0.02 NG/ML (ref 0–0.04)
UROBILINOGEN UR QL STRIP.AUTO: 0.2 EU/DL (ref 0.2–1)
WBC # BLD AUTO: 20.1 K/UL (ref 4.6–13.2)

## 2021-02-17 PROCEDURE — 93005 ELECTROCARDIOGRAM TRACING: CPT

## 2021-02-17 PROCEDURE — 87040 BLOOD CULTURE FOR BACTERIA: CPT

## 2021-02-17 PROCEDURE — 74011250636 HC RX REV CODE- 250/636: Performed by: EMERGENCY MEDICINE

## 2021-02-17 PROCEDURE — 82728 ASSAY OF FERRITIN: CPT

## 2021-02-17 PROCEDURE — 71045 X-RAY EXAM CHEST 1 VIEW: CPT

## 2021-02-17 PROCEDURE — 71275 CT ANGIOGRAPHY CHEST: CPT

## 2021-02-17 PROCEDURE — 84145 PROCALCITONIN (PCT): CPT

## 2021-02-17 PROCEDURE — 74011636637 HC RX REV CODE- 636/637: Performed by: EMERGENCY MEDICINE

## 2021-02-17 PROCEDURE — 99285 EMERGENCY DEPT VISIT HI MDM: CPT

## 2021-02-17 PROCEDURE — 94640 AIRWAY INHALATION TREATMENT: CPT

## 2021-02-17 PROCEDURE — 85379 FIBRIN DEGRADATION QUANT: CPT

## 2021-02-17 PROCEDURE — 81003 URINALYSIS AUTO W/O SCOPE: CPT

## 2021-02-17 PROCEDURE — 87635 SARS-COV-2 COVID-19 AMP PRB: CPT

## 2021-02-17 PROCEDURE — 85610 PROTHROMBIN TIME: CPT

## 2021-02-17 PROCEDURE — 74011000250 HC RX REV CODE- 250: Performed by: EMERGENCY MEDICINE

## 2021-02-17 PROCEDURE — 84484 ASSAY OF TROPONIN QUANT: CPT

## 2021-02-17 PROCEDURE — 74011000636 HC RX REV CODE- 636: Performed by: EMERGENCY MEDICINE

## 2021-02-17 PROCEDURE — 83735 ASSAY OF MAGNESIUM: CPT

## 2021-02-17 PROCEDURE — 96365 THER/PROPH/DIAG IV INF INIT: CPT

## 2021-02-17 PROCEDURE — 85025 COMPLETE CBC W/AUTO DIFF WBC: CPT

## 2021-02-17 PROCEDURE — 83605 ASSAY OF LACTIC ACID: CPT

## 2021-02-17 PROCEDURE — 80053 COMPREHEN METABOLIC PANEL: CPT

## 2021-02-17 PROCEDURE — 74011000258 HC RX REV CODE- 258: Performed by: EMERGENCY MEDICINE

## 2021-02-17 PROCEDURE — 83880 ASSAY OF NATRIURETIC PEPTIDE: CPT

## 2021-02-17 PROCEDURE — 96367 TX/PROPH/DG ADDL SEQ IV INF: CPT

## 2021-02-17 PROCEDURE — A9270 NON-COVERED ITEM OR SERVICE: HCPCS | Performed by: EMERGENCY MEDICINE

## 2021-02-17 RX ORDER — MAGNESIUM SULFATE HEPTAHYDRATE 40 MG/ML
2 INJECTION, SOLUTION INTRAVENOUS
Status: COMPLETED | OUTPATIENT
Start: 2021-02-17 | End: 2021-02-17

## 2021-02-17 RX ORDER — PREDNISONE 50 MG/1
50 TABLET ORAL DAILY
Qty: 4 TAB | Refills: 0 | Status: SHIPPED | OUTPATIENT
Start: 2021-02-17 | End: 2021-02-20

## 2021-02-17 RX ORDER — IPRATROPIUM BROMIDE AND ALBUTEROL SULFATE 2.5; .5 MG/3ML; MG/3ML
3 SOLUTION RESPIRATORY (INHALATION)
Status: COMPLETED | OUTPATIENT
Start: 2021-02-17 | End: 2021-02-17

## 2021-02-17 RX ORDER — LEVOFLOXACIN 750 MG/1
750 TABLET ORAL DAILY
Qty: 7 TAB | Refills: 0 | Status: SHIPPED | OUTPATIENT
Start: 2021-02-17

## 2021-02-17 RX ORDER — PREDNISONE 20 MG/1
60 TABLET ORAL
Status: COMPLETED | OUTPATIENT
Start: 2021-02-17 | End: 2021-02-17

## 2021-02-17 RX ORDER — SODIUM CHLORIDE 0.9 % (FLUSH) 0.9 %
5-10 SYRINGE (ML) INJECTION AS NEEDED
Status: DISCONTINUED | OUTPATIENT
Start: 2021-02-17 | End: 2021-02-17 | Stop reason: HOSPADM

## 2021-02-17 RX ADMIN — SODIUM CHLORIDE 428 ML: 9 INJECTION, SOLUTION INTRAVENOUS at 10:36

## 2021-02-17 RX ADMIN — IPRATROPIUM BROMIDE AND ALBUTEROL SULFATE 3 ML: .5; 3 SOLUTION RESPIRATORY (INHALATION) at 10:49

## 2021-02-17 RX ADMIN — MAGNESIUM SULFATE HEPTAHYDRATE 2 G: 40 INJECTION, SOLUTION INTRAVENOUS at 11:41

## 2021-02-17 RX ADMIN — PREDNISONE 60 MG: 20 TABLET ORAL at 10:49

## 2021-02-17 RX ADMIN — IOPAMIDOL 74 ML: 755 INJECTION, SOLUTION INTRAVENOUS at 11:44

## 2021-02-17 RX ADMIN — PIPERACILLIN AND TAZOBACTAM 3.38 G: 3; .375 INJECTION, POWDER, LYOPHILIZED, FOR SOLUTION INTRAVENOUS at 11:32

## 2021-02-17 RX ADMIN — SODIUM CHLORIDE 1000 ML: 9 INJECTION, SOLUTION INTRAVENOUS at 10:36

## 2021-02-17 NOTE — ED PROVIDER NOTES
65-year-old female with a history of COPD presents with shortness of breath and the cough is persistence of this 4-day sitters received her first Covid vaccine  and since about the  she has had shortness of breath with exertion cough and not feeling well denies any fevers chills nausea vomiting chest pain, pain urinary symptoms. This note dictated in dragon software. There may be grammatical and spelling errors that are missed during review    Review of systems: all other systems negative unless otherwise specified      The history is provided by the patient. Shortness of Breath  This is a new problem. The average episode lasts 4 days. The problem occurs continuously. The current episode started more than 2 days ago. The problem has not changed since onset. Associated symptoms include cough and wheezing. Pertinent negatives include no fever, no rhinorrhea, no neck pain, no sputum production, no PND, no orthopnea, no chest pain, no syncope, no vomiting, no abdominal pain, no rash and no leg swelling. Past Medical History:   Diagnosis Date    Asthma     Chronic obstructive pulmonary disease ([facility] [address]     Diabetes ([facility] [address]     Heart murmur     Menopause        No past surgical history on file. History reviewed. No pertinent family history.     Social History     Socioeconomic History    Marital status:      Spouse name: Not on file    Number of children: Not on file    Years of education: Not on file    Highest education level: Not on file   Occupational History    Not on file   Social Needs    Financial resource strain: Not on file    Food insecurity     Worry: Not on file     Inability: Not on file    Transportation needs     Medical: Not on file     Non-medical: Not on file   Tobacco Use    Smoking status: Former Smoker     Quit date:      Years since quittin.1    Smokeless tobacco: Never Used   Substance and Sexual Activity    Alcohol use: Never Frequency: Never    Drug use: Never    Sexual activity: Not on file   Lifestyle    Physical activity     Days per week: Not on file     Minutes per session: Not on file    Stress: Not on file   Relationships    Social connections     Talks on phone: Not on file     Gets together: Not on file     Attends Yarsanism service: Not on file     Active member of club or organization: Not on file     Attends meetings of clubs or organizations: Not on file     Relationship status: Not on file    Intimate partner violence     Fear of current or ex partner: Not on file     Emotionally abused: Not on file     Physically abused: Not on file     Forced sexual activity: Not on file   Other Topics Concern    Not on file   Social History Narrative    Not on file         ALLERGIES: Sulfa (sulfonamide antibiotics)    Review of Systems   Constitutional: Negative for fever. HENT: Negative for rhinorrhea. Eyes: Negative for visual disturbance. Respiratory: Positive for cough, shortness of breath and wheezing. Negative for sputum production. Cardiovascular: Negative for chest pain, orthopnea, leg swelling, syncope and PND. Gastrointestinal: Negative for abdominal pain and vomiting. Genitourinary: Negative for dysuria. Musculoskeletal: Negative for neck pain. Skin: Negative for rash and wound. Neurological: Negative for weakness and light-headedness. All other systems reviewed and are negative. Vitals:    02/17/21 1008 02/17/21 1019   BP: 137/80    Pulse: (!) 117    Resp: 22    Temp: 98.4 °F (36.9 °C)    SpO2: 92% 92%   Weight: 47.6 kg (105 lb)    Height: 5' 2\" (1.575 m)             Physical Exam  Vitals signs and nursing note reviewed. Constitutional:       General: She is not in acute distress. Appearance: She is well-developed. She is not ill-appearing, toxic-appearing or diaphoretic. HENT:      Head: Normocephalic and atraumatic. Eyes:      Extraocular Movements: Extraocular movements intact. Conjunctiva/sclera: Conjunctivae normal.   Cardiovascular:      Rate and Rhythm: Regular rhythm. Tachycardia present. Pulmonary:      Effort: Pulmonary effort is normal.      Breath sounds: Rales present. Abdominal:      General: Bowel sounds are normal.      Palpations: Abdomen is soft. Tenderness: There is no abdominal tenderness. Musculoskeletal: Normal range of motion. Right lower leg: No edema. Left lower leg: No edema. Skin:     General: Skin is warm. Neurological:      General: No focal deficit present. Mental Status: She is alert. Psychiatric:         Mood and Affect: Mood normal.          MDM  Number of Diagnoses or Management Options  Pneumonia due to infectious organism, unspecified laterality, unspecified part of lung  Diagnosis management comments: 70-year-old female with cough and rales on examination she received her first cold vaccine the symptoms been going on for least 4 days concern for possible Covid infection now as well she was taking her inhalers at home with limited relief we will give a dose here and also steroids check appropriate blood work chest x-ray.   EKG shows a sinus tachycardia without STEMI pending troponin and BMP    Covid swab negative patient with an elevation of white count to 20 with a left shift a lactate of 3 still slightly tachycardic in 112 with an elevated D-dimer this may be a pneumonia she is already been on doxycycline we will add Zosyn and CTA chest    Covid negative CT without pulmonary embolism or lobar pneumonia patient is baseline pulse ox 9596% which is her normal she is on prednisone chronically which explains her elevated white count her lactate 3.7 patient feels markedly improved current vital signs show a pulse of 100 with a blood pressure 126/52 I believe we will change her medications around to Levaquin and add prednisone follow-up with primary care provider patient does not desire to be admitted which she could be however does increase her risk for Covid in feel she is much more better served by going home with a trial of outpatient therapy which patient agrees.   Case also discussed with the hospitalist who agrees    EKG shows a sinus tachycardia rate of 109 normal axis no obvious acute ischemia or ectopy or infarct, this EKG was interpreted by me         Procedures

## 2021-02-18 LAB
ATRIAL RATE: 109 BPM
CALCULATED P AXIS, ECG09: 75 DEGREES
CALCULATED R AXIS, ECG10: 72 DEGREES
CALCULATED T AXIS, ECG11: 48 DEGREES
DIAGNOSIS, 93000: NORMAL
P-R INTERVAL, ECG05: 130 MS
Q-T INTERVAL, ECG07: 330 MS
QRS DURATION, ECG06: 78 MS
QTC CALCULATION (BEZET), ECG08: 444 MS
VENTRICULAR RATE, ECG03: 109 BPM

## 2022-06-13 NOTE — PROGRESS NOTES
TRANSFER - IN REPORT:    Verbal report received from [facility] (name) on Cathryn Wong  being received from ED (unit) for routine progression of care      Report consisted of patients Situation, Background, Assessment and   Recommendations(SBAR). Information from the following report(s) SBAR, MAR and Quality Measures was reviewed with the receiving nurse. Opportunity for questions and clarification was provided. Assessment will be completed upon patients arrival to unit and care assumed. 2019: pt on unit. Assessed and care assumed at this time. pt noted to have bilateral lower lobe crackles on auscultation. 2200: bedtime med administration completed. Pt assisted to bedside commode and back to bed. resting quietly in bed. Will continue to monitor. 0103: pt complaining of chills and headache. . Assessed temp. 100. 9. will treat according to orderb. 0240: AM labs completed. Pt treated for pain and fever. See eMAR for more details. Reassessment WDL. See flow sheet for more details. 7278: Bedside shift change report given to Luís Ordaz (oncoming nurse) by Lindsay Márquez RN (offgoing nurse). Report included the following information SBAR, Intake/Output, MAR and Quality Measures. Opportunity for questions and clarification provided. Burow's Graft Text: The defect edges were debeveled with a #15 scalpel blade.  Given the location of the defect, shape of the defect, the proximity to free margins and the presence of a standing cone deformity a Burow's skin graft was deemed most appropriate. The standing cone was removed and this tissue was then trimmed to the shape of the primary defect. The adipose tissue was also removed until only dermis and epidermis were left.  The skin margins of the secondary defect were undermined to an appropriate distance in all directions utilizing iris scissors.  The secondary defect was closed with interrupted buried subcutaneous sutures.  The skin edges were then re-apposed with running  sutures.  The skin graft was then placed in the primary defect and oriented appropriately.

## 2022-11-09 ENCOUNTER — TRANSCRIBE ORDER (OUTPATIENT)
Dept: SCHEDULING | Age: 72
End: 2022-11-09

## 2022-11-09 DIAGNOSIS — Z12.31 VISIT FOR SCREENING MAMMOGRAM: Primary | ICD-10-CM
